# Patient Record
Sex: MALE | Race: WHITE | NOT HISPANIC OR LATINO | Employment: OTHER | URBAN - NONMETROPOLITAN AREA
[De-identification: names, ages, dates, MRNs, and addresses within clinical notes are randomized per-mention and may not be internally consistent; named-entity substitution may affect disease eponyms.]

---

## 2017-03-21 ENCOUNTER — HOSPITAL ENCOUNTER (EMERGENCY)
Facility: HOSPITAL | Age: 77
Discharge: HOME | End: 2017-03-21
Attending: EMERGENCY MEDICINE | Admitting: EMERGENCY MEDICINE

## 2017-03-21 VITALS
DIASTOLIC BLOOD PRESSURE: 76 MMHG | RESPIRATION RATE: 16 BRPM | OXYGEN SATURATION: 100 % | BODY MASS INDEX: 28.63 KG/M2 | SYSTOLIC BLOOD PRESSURE: 128 MMHG | HEIGHT: 70 IN | TEMPERATURE: 98.1 F | HEART RATE: 74 BPM | WEIGHT: 200 LBS

## 2017-03-21 DIAGNOSIS — I83.899: Primary | ICD-10-CM

## 2017-03-21 LAB
ALBUMIN SERPL-MCNC: 3.6 G/DL (ref 3.4–4.8)
ALBUMIN/GLOB SERPL: 1.3 G/DL (ref 1.5–2.5)
ALP SERPL-CCNC: 74 U/L (ref 40–129)
ALT SERPL W P-5'-P-CCNC: 13 U/L (ref 10–44)
ANION GAP SERPL CALCULATED.3IONS-SCNC: 1.6 MMOL/L (ref 3.6–11.2)
AST SERPL-CCNC: 23 U/L (ref 10–34)
BASOPHILS # BLD AUTO: 0.04 10*3/MM3 (ref 0–0.3)
BASOPHILS NFR BLD AUTO: 0.6 % (ref 0–2)
BILIRUB SERPL-MCNC: 1 MG/DL (ref 0.2–1.8)
BUN BLD-MCNC: 25 MG/DL (ref 7–21)
BUN/CREAT SERPL: 21.2 (ref 7–25)
CALCIUM SPEC-SCNC: 8.6 MG/DL (ref 7.7–10)
CHLORIDE SERPL-SCNC: 103 MMOL/L (ref 99–112)
CO2 SERPL-SCNC: 33.4 MMOL/L (ref 24.3–31.9)
CREAT BLD-MCNC: 1.18 MG/DL (ref 0.43–1.29)
DEPRECATED RDW RBC AUTO: 53 FL (ref 37–54)
EOSINOPHIL # BLD AUTO: 0.35 10*3/MM3 (ref 0–0.7)
EOSINOPHIL NFR BLD AUTO: 5.1 % (ref 0–7)
ERYTHROCYTE [DISTWIDTH] IN BLOOD BY AUTOMATED COUNT: 16.4 % (ref 11.5–14.5)
GFR SERPL CREATININE-BSD FRML MDRD: 60 ML/MIN/1.73
GLOBULIN UR ELPH-MCNC: 2.7 GM/DL
GLUCOSE BLD-MCNC: 153 MG/DL (ref 70–110)
HCT VFR BLD AUTO: 26.8 % (ref 42–52)
HGB BLD-MCNC: 8.6 G/DL (ref 14–18)
IMM GRANULOCYTES # BLD: 0.02 10*3/MM3 (ref 0–0.03)
IMM GRANULOCYTES NFR BLD: 0.3 % (ref 0–0.5)
INR PPP: 1.63 (ref 0.8–1.1)
LYMPHOCYTES # BLD AUTO: 1.08 10*3/MM3 (ref 1–3)
LYMPHOCYTES NFR BLD AUTO: 15.6 % (ref 16–46)
MCH RBC QN AUTO: 28.3 PG (ref 27–33)
MCHC RBC AUTO-ENTMCNC: 32.1 G/DL (ref 33–37)
MCV RBC AUTO: 88.2 FL (ref 80–94)
MONOCYTES # BLD AUTO: 0.55 10*3/MM3 (ref 0.1–0.9)
MONOCYTES NFR BLD AUTO: 8 % (ref 0–12)
NEUTROPHILS # BLD AUTO: 4.87 10*3/MM3 (ref 1.4–6.5)
NEUTROPHILS NFR BLD AUTO: 70.4 % (ref 40–75)
OSMOLALITY SERPL CALC.SUM OF ELEC: 283.1 MOSM/KG (ref 273–305)
PLATELET # BLD AUTO: 189 10*3/MM3 (ref 130–400)
PMV BLD AUTO: 10.6 FL (ref 6–10)
POTASSIUM BLD-SCNC: 3.6 MMOL/L (ref 3.5–5.3)
PROT SERPL-MCNC: 6.3 G/DL (ref 6–8)
PROTHROMBIN TIME: 18.7 SECONDS (ref 9.8–11.9)
RBC # BLD AUTO: 3.04 10*6/MM3 (ref 4.7–6.1)
SODIUM BLD-SCNC: 138 MMOL/L (ref 135–153)
WBC NRBC COR # BLD: 6.91 10*3/MM3 (ref 4.5–12.5)

## 2017-03-21 PROCEDURE — 99283 EMERGENCY DEPT VISIT LOW MDM: CPT

## 2017-03-21 PROCEDURE — 36415 COLL VENOUS BLD VENIPUNCTURE: CPT

## 2017-03-21 PROCEDURE — 85025 COMPLETE CBC W/AUTO DIFF WBC: CPT | Performed by: EMERGENCY MEDICINE

## 2017-03-21 PROCEDURE — 80053 COMPREHEN METABOLIC PANEL: CPT | Performed by: EMERGENCY MEDICINE

## 2017-03-21 PROCEDURE — 85610 PROTHROMBIN TIME: CPT | Performed by: EMERGENCY MEDICINE

## 2017-03-21 RX ORDER — LIDOCAINE HYDROCHLORIDE AND EPINEPHRINE 10; 10 MG/ML; UG/ML
INJECTION, SOLUTION INFILTRATION; PERINEURAL
Status: DISCONTINUED
Start: 2017-03-21 | End: 2017-03-21 | Stop reason: HOSPADM

## 2017-03-21 NOTE — ED PROVIDER NOTES
Subjective   Patient is a 76 y.o. male presenting with lower extremity pain.   Lower Extremity Issue   Location:  Ankle  Time since incident:  12 hours  Pain details:     Quality:  Unable to specify    Radiates to:  Does not radiate    Severity:  No pain  Ineffective treatments: pressure didnt stop bleeding.  Associated symptoms: back pain    Associated symptoms: no fever, no neck pain and no swelling        Review of Systems   Constitutional: Negative.  Negative for fever.   HENT: Negative.    Respiratory: Negative.    Cardiovascular: Negative.  Negative for chest pain.   Gastrointestinal: Negative.  Negative for abdominal pain.   Endocrine: Negative.    Genitourinary: Negative.  Negative for dysuria.   Musculoskeletal: Positive for back pain. Negative for neck pain.   Skin: Negative.         Bleeding from varicose vein left lower extremity   Neurological: Negative.    Psychiatric/Behavioral: Negative.    All other systems reviewed and are negative.      Past Medical History   Diagnosis Date   • Hypertension        No Known Allergies    Past Surgical History   Procedure Laterality Date   • Cataract extraction     • Hernia repair     • Coronary artery bypass graft     • Cardiac pacemaker placement     • Knee surgery     • Ankle surgery     • Hydrocele excision / repair         No family history on file.    Social History     Social History   • Marital status:      Spouse name: N/A   • Number of children: N/A   • Years of education: N/A     Social History Main Topics   • Smoking status: Never Smoker   • Smokeless tobacco: Not on file   • Alcohol use No   • Drug use: No   • Sexual activity: Defer     Other Topics Concern   • Not on file     Social History Narrative   • No narrative on file           Objective   Physical Exam   Constitutional: He is oriented to person, place, and time. He appears well-developed and well-nourished. No distress.   HENT:   Head: Normocephalic and atraumatic.   Right Ear: External  ear normal.   Left Ear: External ear normal.   Nose: Nose normal.   Eyes: Conjunctivae and EOM are normal. Pupils are equal, round, and reactive to light.   Neck: Normal range of motion. Neck supple. No JVD present. No tracheal deviation present.   Cardiovascular: Normal rate, regular rhythm and normal heart sounds.    No murmur heard.  Pulmonary/Chest: Effort normal and breath sounds normal. No respiratory distress. He has no wheezes.   Abdominal: Soft. Bowel sounds are normal. There is no tenderness.   Musculoskeletal: Normal range of motion. He exhibits no edema or deformity.   Neurological: He is alert and oriented to person, place, and time. No cranial nerve deficit.   Skin: Skin is warm and dry. No rash noted. He is not diaphoretic. No erythema. No pallor.   Bleeding from varicose vein left lower extremity   Psychiatric: He has a normal mood and affect. His behavior is normal. Thought content normal.   Nursing note and vitals reviewed.      Procedures         ED Course  ED Course                  MDM    Final diagnoses:   Bleeding from varicose vein, unspecified laterality            Chava Sheikh MD  03/21/17 4094

## 2017-03-21 NOTE — ED NOTES
Dressing applied consisting of telfa, 4x4, and jamie.  Bleeding controled.       Linda Barrett RN  03/21/17 [id number]

## 2017-03-21 NOTE — ED NOTES
Bleeding area cauterized with silver nitrate applicator by Dr. Sheikh.  Bleeding controlled.     Linda Barrett RN  03/21/17 [id number]

## 2017-04-11 ENCOUNTER — OFFICE VISIT (OUTPATIENT)
Dept: SURGERY | Facility: CLINIC | Age: 77
End: 2017-04-11

## 2017-04-11 VITALS
RESPIRATION RATE: 20 BRPM | WEIGHT: 200 LBS | HEIGHT: 70 IN | SYSTOLIC BLOOD PRESSURE: 125 MMHG | HEART RATE: 80 BPM | BODY MASS INDEX: 28.63 KG/M2 | TEMPERATURE: 97.6 F | DIASTOLIC BLOOD PRESSURE: 77 MMHG

## 2017-04-11 DIAGNOSIS — I83.893 VARICOSE VEINS OF BILATERAL LOWER EXTREMITIES WITH OTHER COMPLICATIONS: Primary | ICD-10-CM

## 2017-04-11 PROCEDURE — 99202 OFFICE O/P NEW SF 15 MIN: CPT | Performed by: SURGERY

## 2017-04-11 NOTE — PROGRESS NOTES
4/11/2017    Patient Information  Luis Thompson   [address]  [place] [address]  1940  [phone] (home)     Chief Complaint   Patient presents with   • Varicose Veins         HPI  Patient is a 76-year-old white male referred from the emergency room.  He went there a few days ago because he had some bleeding from superficial varicosities in the left lower extremity.  He reports having varicose veins many years.  He is on Coumadin.  He has multiple comorbidities related to his heart.        Review of Systems:    General: negative  Integumentary: non-healing wound  Eyes: glasses  ENT: negative  Respiratory: shortness of breath  Gastrointestinal: negative  Cardiovascular: negative  Neurological: negative  Psychiatric: negative  Hematologic/Lymphatic: easy bleeding and easy bruising  Genitourinary: negative  Musculoskeletal: negative  Endocrine: negative  Breasts: negative      There is no problem list on file for this patient.        Past Medical History:   Diagnosis Date   • Atrial fibrillation    • Heart disease    • Hypertension          Past Surgical History:   Procedure Laterality Date   • ANKLE SURGERY     • CARDIAC PACEMAKER PLACEMENT     • CATARACT EXTRACTION     • CORONARY ARTERY BYPASS GRAFT     • HERNIA REPAIR     • HYDROCELE EXCISION / REPAIR     • KNEE SURGERY           History reviewed. No pertinent family history.      Social History   Substance Use Topics   • Smoking status: Never Smoker   • Smokeless tobacco: None   • Alcohol use No       Current Outpatient Prescriptions   Medication Sig Dispense Refill   • carvedilol (COREG) 25 MG tablet Take 25 mg by mouth Daily.     • CloNIDine (CATAPRES) 0.1 MG tablet Take 0.1 mg by mouth As Needed for high blood pressure.     • furosemide (LASIX) 20 MG tablet Take 20 mg by mouth Daily.     • potassium chloride (K-DUR,KLOR-CON) 20 MEQ CR tablet Take 20 mEq by mouth Daily.     • warfarin (COUMADIN) 5 MG tablet Take 5 mg by mouth Daily.       No current  "facility-administered medications for this visit.          Allergies  Review of patient's allergies indicates no known allergies.      Physical Exam  Bilateral lower extremities with multiple varicosities    /77  Pulse 80  Temp 97.6 °F (36.4 °C) (Oral)   Resp 20  Ht 70\" (177.8 cm)  Wt 200 lb (90.7 kg)  BMI 28.7 kg/m2      ASSESSMENT  Bilateral varicose veins on Coumadin        PLAN    We'll get venous Doppler bilateral lower extremities to evaluate his deep venous system.  I think we can probably get by without any surgical intervention.        Magan Hu MD    "

## 2017-04-19 ENCOUNTER — HOSPITAL ENCOUNTER (OUTPATIENT)
Dept: CARDIOLOGY | Facility: HOSPITAL | Age: 77
Discharge: HOME | End: 2017-04-19
Attending: SURGERY | Admitting: SURGERY

## 2017-04-19 DIAGNOSIS — I83.893 VARICOSE VEINS OF BILATERAL LOWER EXTREMITIES WITH OTHER COMPLICATIONS: ICD-10-CM

## 2017-04-19 PROCEDURE — 93970 EXTREMITY STUDY: CPT

## 2017-04-19 PROCEDURE — 93970 EXTREMITY STUDY: CPT | Performed by: RADIOLOGY

## 2017-05-02 ENCOUNTER — OFFICE VISIT (OUTPATIENT)
Dept: SURGERY | Facility: CLINIC | Age: 77
End: 2017-05-02

## 2017-05-02 VITALS
DIASTOLIC BLOOD PRESSURE: 77 MMHG | WEIGHT: 200 LBS | HEIGHT: 70 IN | HEART RATE: 79 BPM | RESPIRATION RATE: 17 BRPM | BODY MASS INDEX: 28.63 KG/M2 | TEMPERATURE: 98.2 F | SYSTOLIC BLOOD PRESSURE: 126 MMHG

## 2017-05-02 DIAGNOSIS — I83.893 VARICOSE VEINS OF BILATERAL LOWER EXTREMITIES WITH OTHER COMPLICATIONS: Primary | ICD-10-CM

## 2017-05-02 PROBLEM — Z09 FOLLOW UP: Status: ACTIVE | Noted: 2017-05-02

## 2017-05-02 PROCEDURE — 99213 OFFICE O/P EST LOW 20 MIN: CPT | Performed by: SURGERY

## 2017-08-24 ENCOUNTER — TRANSCRIBE ORDERS (OUTPATIENT)
Dept: ADMINISTRATIVE | Facility: HOSPITAL | Age: 77
End: 2017-08-24

## 2017-08-24 DIAGNOSIS — R60.9 SWELLING: Primary | ICD-10-CM

## 2017-08-25 ENCOUNTER — HOSPITAL ENCOUNTER (OUTPATIENT)
Dept: ULTRASOUND IMAGING | Facility: HOSPITAL | Age: 77
Discharge: HOME | End: 2017-08-25
Attending: INTERNAL MEDICINE | Admitting: INTERNAL MEDICINE

## 2017-08-25 DIAGNOSIS — R60.9 SWELLING: ICD-10-CM

## 2017-08-25 PROCEDURE — 76700 US EXAM ABDOM COMPLETE: CPT

## 2017-08-25 PROCEDURE — 76700 US EXAM ABDOM COMPLETE: CPT | Performed by: RADIOLOGY

## 2017-08-29 ENCOUNTER — TRANSCRIBE ORDERS (OUTPATIENT)
Dept: ADMINISTRATIVE | Facility: HOSPITAL | Age: 77
End: 2017-08-29

## 2017-08-29 DIAGNOSIS — R93.5 ABNORMAL US (ULTRASOUND) OF ABDOMEN: Primary | ICD-10-CM

## 2017-08-30 ENCOUNTER — HOSPITAL ENCOUNTER (OUTPATIENT)
Dept: CT IMAGING | Facility: HOSPITAL | Age: 77
Discharge: HOME | End: 2017-08-30
Attending: INTERNAL MEDICINE | Admitting: INTERNAL MEDICINE

## 2017-08-30 DIAGNOSIS — R93.5 ABNORMAL US (ULTRASOUND) OF ABDOMEN: ICD-10-CM

## 2017-08-30 LAB — CREAT BLDA-MCNC: 1.3 MG/DL (ref 0.6–1.3)

## 2017-08-30 PROCEDURE — 82565 ASSAY OF CREATININE: CPT

## 2017-08-30 PROCEDURE — 74177 CT ABD & PELVIS W/CONTRAST: CPT | Performed by: RADIOLOGY

## 2017-08-30 PROCEDURE — 74177 CT ABD & PELVIS W/CONTRAST: CPT

## 2017-08-30 PROCEDURE — 0 IOPAMIDOL 61 % SOLUTION: Performed by: INTERNAL MEDICINE

## 2017-08-30 RX ADMIN — IOPAMIDOL 100 ML: 612 INJECTION, SOLUTION INTRAVENOUS at 09:30

## 2017-09-01 ENCOUNTER — OFFICE VISIT (OUTPATIENT)
Dept: SURGERY | Facility: CLINIC | Age: 77
End: 2017-09-01

## 2017-09-01 VITALS
DIASTOLIC BLOOD PRESSURE: 77 MMHG | WEIGHT: 203.6 LBS | HEIGHT: 70 IN | BODY MASS INDEX: 29.15 KG/M2 | SYSTOLIC BLOOD PRESSURE: 134 MMHG | HEART RATE: 71 BPM

## 2017-09-01 DIAGNOSIS — D50.0 IRON DEFICIENCY ANEMIA DUE TO CHRONIC BLOOD LOSS: ICD-10-CM

## 2017-09-01 DIAGNOSIS — I25.810 CORONARY ARTERY DISEASE INVOLVING CORONARY BYPASS GRAFT OF NATIVE HEART WITHOUT ANGINA PECTORIS: ICD-10-CM

## 2017-09-01 DIAGNOSIS — I15.9 SECONDARY HYPERTENSION: ICD-10-CM

## 2017-09-01 DIAGNOSIS — D64.9 ANEMIA, UNSPECIFIED TYPE: Primary | ICD-10-CM

## 2017-09-01 DIAGNOSIS — I48.91 ATRIAL FIBRILLATION, UNSPECIFIED TYPE (HCC): ICD-10-CM

## 2017-09-01 PROCEDURE — 99215 OFFICE O/P EST HI 40 MIN: CPT | Performed by: SURGERY

## 2017-09-01 NOTE — PROGRESS NOTES
9/1/2017    Patient Information  Luis Thompson   [address]  [place] [address]  1940  [phone] (home)     Chief Complaint   Patient presents with   • Follow-up   • Results       HPI  Patient is a 77-year-old white male who has anemia.  He is referred by Dr. Pérez.  He has iron deficiency anemia with etiology unknown.  She is referred him for an EGD and colonoscopy for further evaluation of his anemia.  He takes Coumadin for atrial..  He also has coronary artery disease.     Review of Syste  Review of systems reviewed and confirmed    General: negative  Integumentary: non-healing wound  Eyes: glasses  ENT: negative  Respiratory: shortness of breath  Gastrointestinal: negative  Cardiovascular: negative  Neurological: negative  Psychiatric: negative  Hematologic/Lymphatic: easy bleeding and easy bruising  Genitourinary: negative  Musculoskeletal: negative  Endocrine: negative  Breasts: negative  There is no problem list on file for this patient.      Patient Active Problem List   Diagnosis   • Follow up         Past Medical History:   Diagnosis Date   • Atrial fibrillation    • Heart disease    • Hypertension          Past Surgical History:   Procedure Laterality Date   • ANKLE SURGERY     • CARDIAC PACEMAKER PLACEMENT     • CATARACT EXTRACTION     • CORONARY ARTERY BYPASS GRAFT     • HERNIA REPAIR     • HYDROCELE EXCISION / REPAIR     • KNEE SURGERY           History reviewed. No pertinent family history.      Social History   Substance Use Topics   • Smoking status: Never Smoker   • Smokeless tobacco: None   • Alcohol use No       Current Outpatient Prescriptions   Medication Sig Dispense Refill   • carvedilol (COREG) 25 MG tablet Take 25 mg by mouth Daily.     • CloNIDine (CATAPRES) 0.1 MG tablet Take 0.1 mg by mouth As Needed for high blood pressure.     • furosemide (LASIX) 20 MG tablet Take 20 mg by mouth Daily.     • potassium chloride (K-DUR,KLOR-CON) 20 MEQ CR tablet Take 20 mEq by mouth Daily.     •  warfarin (COUMADIN) 5 MG tablet Take 5 mg by mouth Daily.       No current facility-administered medications for this visit.          Allergies  Review of patient's allergies indicates no known allergies.      Physical Exam   Constitutional: He is oriented to person, place, and time. He appears well-developed and well-nourished. No distress.   HENT:   Head: Normocephalic.   Right Ear: External ear normal.   Left Ear: External ear normal.   Nose: Nose normal.   Mouth/Throat: Oropharynx is clear and moist.   Eyes: Conjunctivae and EOM are normal. Right eye exhibits no discharge. Left eye exhibits no discharge.   Neck: Normal range of motion. No JVD present. No tracheal deviation present. No thyromegaly present.   Cardiovascular: Normal rate, regular rhythm, normal heart sounds and intact distal pulses.  Exam reveals no gallop and no friction rub.    No murmur heard.  Pulmonary/Chest: Effort normal and breath sounds normal. No stridor. No respiratory distress. He has no wheezes. He has no rales. He exhibits no tenderness.   Sternal scar from coronary artery bypass   Abdominal: Soft. Bowel sounds are normal. He exhibits no distension and no mass. There is no tenderness. There is no rebound and no guarding. No hernia.   Firmness around the umbilicus where he had mesh placed for hernia repair   Genitourinary: Rectal exam shows guaiac negative stool.   Musculoskeletal: Normal range of motion. He exhibits no edema, tenderness or deformity.   Lymphadenopathy:     He has no cervical adenopathy.   Neurological: He is alert and oriented to person, place, and time. He has normal reflexes. He displays normal reflexes. No cranial nerve deficit. He exhibits normal muscle tone. Coordination normal.   Skin: Skin is warm and dry. No rash noted. He is not diaphoretic. No erythema. No pallor.   Psychiatric: He has a normal mood and affect. His behavior is normal. Judgment and thought content normal.   Nursing note and vitals  "reviewed.        /77 (BP Location: Left arm, Patient Position: Sitting)  Pulse 71  Ht 70\" (177.8 cm)  Wt 203 lb 9.6 oz (92.4 kg)  BMI 29.21 kg/m2        ASSESSMENT  Patient has Iron Deficiency Anemia and is on Coumadin.        PLAN    EGD and colonoscopy.  We'll check INR day of procedure.  If it is 2 or less we'll proceed.        Magan Hu MD    "

## 2017-09-05 ENCOUNTER — PREP FOR SURGERY (OUTPATIENT)
Dept: OTHER | Facility: HOSPITAL | Age: 77
End: 2017-09-05

## 2017-09-05 ENCOUNTER — TELEPHONE (OUTPATIENT)
Dept: SURGERY | Facility: CLINIC | Age: 77
End: 2017-09-05

## 2017-09-05 PROBLEM — I25.810 CORONARY ARTERY DISEASE INVOLVING CORONARY BYPASS GRAFT OF NATIVE HEART WITHOUT ANGINA PECTORIS: Status: ACTIVE | Noted: 2017-09-05

## 2017-09-05 PROBLEM — I15.9 SECONDARY HYPERTENSION: Status: ACTIVE | Noted: 2017-09-05

## 2017-09-05 PROBLEM — D64.9 ANEMIA: Status: ACTIVE | Noted: 2017-09-05

## 2017-09-05 PROBLEM — I48.91 ATRIAL FIBRILLATION: Status: ACTIVE | Noted: 2017-09-05

## 2017-09-05 NOTE — TELEPHONE ENCOUNTER
Called to let patient know that he will need to come off of his Coumadin 3 days prior to his surgery and also that we will need to check his I&R on the day of surgery.

## 2017-09-06 RX ORDER — FUROSEMIDE 40 MG/1
TABLET ORAL DAILY
COMMUNITY
Start: 2013-08-12 | End: 2021-03-16

## 2017-09-06 RX ORDER — CARVEDILOL 25 MG/1
25 TABLET ORAL DAILY
COMMUNITY
Start: 2013-02-08 | End: 2021-08-26 | Stop reason: SDUPTHER

## 2017-09-07 ENCOUNTER — TELEPHONE (OUTPATIENT)
Dept: SURGERY | Facility: CLINIC | Age: 77
End: 2017-09-07

## 2017-09-08 ENCOUNTER — ANESTHESIA EVENT (OUTPATIENT)
Dept: PERIOP | Facility: HOSPITAL | Age: 77
End: 2017-09-08

## 2017-09-08 ENCOUNTER — ANESTHESIA (OUTPATIENT)
Dept: PERIOP | Facility: HOSPITAL | Age: 77
End: 2017-09-08

## 2017-09-08 ENCOUNTER — HOSPITAL ENCOUNTER (OUTPATIENT)
Facility: HOSPITAL | Age: 77
Setting detail: HOSPITAL OUTPATIENT SURGERY
Discharge: HOME | End: 2017-09-08
Attending: SURGERY | Admitting: SURGERY

## 2017-09-08 VITALS
DIASTOLIC BLOOD PRESSURE: 84 MMHG | TEMPERATURE: 97.5 F | HEART RATE: 72 BPM | WEIGHT: 200 LBS | RESPIRATION RATE: 20 BRPM | HEIGHT: 70 IN | BODY MASS INDEX: 28.63 KG/M2 | OXYGEN SATURATION: 100 % | SYSTOLIC BLOOD PRESSURE: 133 MMHG

## 2017-09-08 DIAGNOSIS — I15.9 SECONDARY HYPERTENSION: ICD-10-CM

## 2017-09-08 DIAGNOSIS — I25.810 CORONARY ARTERY DISEASE INVOLVING CORONARY BYPASS GRAFT OF NATIVE HEART WITHOUT ANGINA PECTORIS: ICD-10-CM

## 2017-09-08 DIAGNOSIS — I48.91 ATRIAL FIBRILLATION, UNSPECIFIED TYPE (HCC): ICD-10-CM

## 2017-09-08 DIAGNOSIS — D64.9 ANEMIA, UNSPECIFIED TYPE: ICD-10-CM

## 2017-09-08 LAB
APTT PPP: 53.8 SECONDS (ref 23.8–36.1)
INR PPP: 2.09 (ref 0.9–1.1)
PROTHROMBIN TIME: 23.7 SECONDS (ref 11–15.4)

## 2017-09-08 PROCEDURE — 25010000002 FENTANYL CITRATE (PF) 100 MCG/2ML SOLUTION: Performed by: NURSE ANESTHETIST, CERTIFIED REGISTERED

## 2017-09-08 PROCEDURE — 45378 DIAGNOSTIC COLONOSCOPY: CPT | Performed by: SURGERY

## 2017-09-08 PROCEDURE — 25010000002 PROPOFOL 1000 MG/ML EMULSION: Performed by: NURSE ANESTHETIST, CERTIFIED REGISTERED

## 2017-09-08 PROCEDURE — 85730 THROMBOPLASTIN TIME PARTIAL: CPT | Performed by: SURGERY

## 2017-09-08 PROCEDURE — 43239 EGD BIOPSY SINGLE/MULTIPLE: CPT | Performed by: SURGERY

## 2017-09-08 PROCEDURE — 25010000002 PROPOFOL 10 MG/ML EMULSION: Performed by: NURSE ANESTHETIST, CERTIFIED REGISTERED

## 2017-09-08 PROCEDURE — 85610 PROTHROMBIN TIME: CPT | Performed by: SURGERY

## 2017-09-08 PROCEDURE — 88305 TISSUE EXAM BY PATHOLOGIST: CPT | Performed by: SURGERY

## 2017-09-08 RX ORDER — LIDOCAINE HYDROCHLORIDE 20 MG/ML
INJECTION, SOLUTION INFILTRATION; PERINEURAL AS NEEDED
Status: DISCONTINUED | OUTPATIENT
Start: 2017-09-08 | End: 2017-09-08 | Stop reason: SURG

## 2017-09-08 RX ORDER — ONDANSETRON 2 MG/ML
4 INJECTION INTRAMUSCULAR; INTRAVENOUS ONCE AS NEEDED
Status: DISCONTINUED | OUTPATIENT
Start: 2017-09-08 | End: 2017-09-08 | Stop reason: HOSPADM

## 2017-09-08 RX ORDER — SODIUM CHLORIDE, SODIUM LACTATE, POTASSIUM CHLORIDE, CALCIUM CHLORIDE 600; 310; 30; 20 MG/100ML; MG/100ML; MG/100ML; MG/100ML
125 INJECTION, SOLUTION INTRAVENOUS CONTINUOUS
Status: DISCONTINUED | OUTPATIENT
Start: 2017-09-08 | End: 2017-09-08 | Stop reason: HOSPADM

## 2017-09-08 RX ORDER — FENTANYL CITRATE 50 UG/ML
50 INJECTION, SOLUTION INTRAMUSCULAR; INTRAVENOUS
Status: DISCONTINUED | OUTPATIENT
Start: 2017-09-08 | End: 2017-09-08 | Stop reason: HOSPADM

## 2017-09-08 RX ORDER — FENTANYL CITRATE 50 UG/ML
INJECTION, SOLUTION INTRAMUSCULAR; INTRAVENOUS AS NEEDED
Status: DISCONTINUED | OUTPATIENT
Start: 2017-09-08 | End: 2017-09-08 | Stop reason: SURG

## 2017-09-08 RX ORDER — IPRATROPIUM BROMIDE AND ALBUTEROL SULFATE 2.5; .5 MG/3ML; MG/3ML
3 SOLUTION RESPIRATORY (INHALATION) ONCE AS NEEDED
Status: DISCONTINUED | OUTPATIENT
Start: 2017-09-08 | End: 2017-09-08 | Stop reason: HOSPADM

## 2017-09-08 RX ORDER — PROPOFOL 10 MG/ML
VIAL (ML) INTRAVENOUS AS NEEDED
Status: DISCONTINUED | OUTPATIENT
Start: 2017-09-08 | End: 2017-09-08 | Stop reason: SURG

## 2017-09-08 RX ORDER — SODIUM CHLORIDE 0.9 % (FLUSH) 0.9 %
1-10 SYRINGE (ML) INJECTION AS NEEDED
Status: DISCONTINUED | OUTPATIENT
Start: 2017-09-08 | End: 2017-09-08 | Stop reason: HOSPADM

## 2017-09-08 RX ADMIN — FENTANYL CITRATE 100 MCG: 50 INJECTION INTRAMUSCULAR; INTRAVENOUS at 11:12

## 2017-09-08 RX ADMIN — PROPOFOL 30 MG: 10 INJECTION, EMULSION INTRAVENOUS at 11:16

## 2017-09-08 RX ADMIN — PROPOFOL 100 MCG/KG/MIN: 10 INJECTION, EMULSION INTRAVENOUS at 11:16

## 2017-09-08 RX ADMIN — SODIUM CHLORIDE, POTASSIUM CHLORIDE, SODIUM LACTATE AND CALCIUM CHLORIDE: 600; 310; 30; 20 INJECTION, SOLUTION INTRAVENOUS at 11:13

## 2017-09-08 RX ADMIN — LIDOCAINE HYDROCHLORIDE 100 MG: 20 INJECTION, SOLUTION INFILTRATION; PERINEURAL at 11:16

## 2017-09-08 NOTE — ANESTHESIA POSTPROCEDURE EVALUATION
Patient: Luis Thompson    Procedure Summary     Date Anesthesia Start Anesthesia Stop Room / Location    09/08/17 1113 1137  [facility] 07 / [facility]       Procedure Diagnosis Surgeon Provider    ESOPHAGOGASTRODUODENOSCOPY (N/A Esophagus); COLONOSCOPY (N/A ) Secondary hypertension; Coronary artery disease involving coronary bypass graft of native heart without angina pectoris; Anemia, unspecified type; Atrial fibrillation, unspecified type  (Secondary hypertension [I15.9]; Coronary artery disease involving coronary bypass graft of native heart without angina pectoris [I25.810]; Anemia, unspecified type [D64.9]; Atrial fibrillation, unspecified type [I48.91]) MD Kwaku Lopez MD          Anesthesia Type: general  Last vitals  BP   106/64 (09/08/17 1143)    Temp   97.5 °F (36.4 °C) (09/08/17 1138)    Pulse   73 (09/08/17 1143)   Resp   20 (09/08/17 1143)    SpO2   99 % (09/08/17 1143)      Post Anesthesia Care and Evaluation    Patient location during evaluation: PHASE II  Patient participation: complete - patient participated  Level of consciousness: awake and alert  Pain score: 1  Pain management: adequate  Airway patency: patent  Anesthetic complications: No anesthetic complications  PONV Status: controlled  Cardiovascular status: acceptable  Respiratory status: acceptable  Hydration status: acceptable

## 2017-09-08 NOTE — PLAN OF CARE
Problem: GI Endoscopy (Adult)  Goal: Signs and Symptoms of Listed Potential Problems Will be Absent or Manageable (GI Endoscopy)  Outcome: Ongoing (interventions implemented as appropriate)    09/08/17 0969   GI Endoscopy   Problems Assessed (GI Endoscopy) all   Problems Present (GI Endoscopy) none

## 2017-09-08 NOTE — H&P (VIEW-ONLY)
9/1/2017    Patient Information  Luis Thompson   [address]  [place] [address]  1940  [phone] (home)     Chief Complaint   Patient presents with   • Follow-up   • Results       HPI  Patient is a 77-year-old white male who has anemia.  He is referred by Dr. éPrez.  He is anemic with etiology unknown.  She is referred him for an EGD and colonoscopy for further evaluation of his anemia.  He takes Coumadin for atrial..  He also has coronary artery disease.     Review of Syste  Review of systems reviewed and confirmed    General: negative  Integumentary: non-healing wound  Eyes: glasses  ENT: negative  Respiratory: shortness of breath  Gastrointestinal: negative  Cardiovascular: negative  Neurological: negative  Psychiatric: negative  Hematologic/Lymphatic: easy bleeding and easy bruising  Genitourinary: negative  Musculoskeletal: negative  Endocrine: negative  Breasts: negative  There is no problem list on file for this patient.      Patient Active Problem List   Diagnosis   • Follow up         Past Medical History:   Diagnosis Date   • Atrial fibrillation    • Heart disease    • Hypertension          Past Surgical History:   Procedure Laterality Date   • ANKLE SURGERY     • CARDIAC PACEMAKER PLACEMENT     • CATARACT EXTRACTION     • CORONARY ARTERY BYPASS GRAFT     • HERNIA REPAIR     • HYDROCELE EXCISION / REPAIR     • KNEE SURGERY           History reviewed. No pertinent family history.      Social History   Substance Use Topics   • Smoking status: Never Smoker   • Smokeless tobacco: None   • Alcohol use No       Current Outpatient Prescriptions   Medication Sig Dispense Refill   • carvedilol (COREG) 25 MG tablet Take 25 mg by mouth Daily.     • CloNIDine (CATAPRES) 0.1 MG tablet Take 0.1 mg by mouth As Needed for high blood pressure.     • furosemide (LASIX) 20 MG tablet Take 20 mg by mouth Daily.     • potassium chloride (K-DUR,KLOR-CON) 20 MEQ CR tablet Take 20 mEq by mouth Daily.     • warfarin  (COUMADIN) 5 MG tablet Take 5 mg by mouth Daily.       No current facility-administered medications for this visit.          Allergies  Review of patient's allergies indicates no known allergies.      Physical Exam   Constitutional: He is oriented to person, place, and time. He appears well-developed and well-nourished. No distress.   HENT:   Head: Normocephalic.   Right Ear: External ear normal.   Left Ear: External ear normal.   Nose: Nose normal.   Mouth/Throat: Oropharynx is clear and moist.   Eyes: Conjunctivae and EOM are normal. Right eye exhibits no discharge. Left eye exhibits no discharge.   Neck: Normal range of motion. No JVD present. No tracheal deviation present. No thyromegaly present.   Cardiovascular: Normal rate, regular rhythm, normal heart sounds and intact distal pulses.  Exam reveals no gallop and no friction rub.    No murmur heard.  Pulmonary/Chest: Effort normal and breath sounds normal. No stridor. No respiratory distress. He has no wheezes. He has no rales. He exhibits no tenderness.   Sternal scar from coronary artery bypass   Abdominal: Soft. Bowel sounds are normal. He exhibits no distension and no mass. There is no tenderness. There is no rebound and no guarding. No hernia.   Firmness around the umbilicus where he had mesh placed for hernia repair   Genitourinary: Rectal exam shows guaiac negative stool.   Musculoskeletal: Normal range of motion. He exhibits no edema, tenderness or deformity.   Lymphadenopathy:     He has no cervical adenopathy.   Neurological: He is alert and oriented to person, place, and time. He has normal reflexes. He displays normal reflexes. No cranial nerve deficit. He exhibits normal muscle tone. Coordination normal.   Skin: Skin is warm and dry. No rash noted. He is not diaphoretic. No erythema. No pallor.   Psychiatric: He has a normal mood and affect. His behavior is normal. Judgment and thought content normal.   Nursing note and vitals reviewed.        BP  "134/77 (BP Location: Left arm, Patient Position: Sitting)  Pulse 71  Ht 70\" (177.8 cm)  Wt 203 lb 9.6 oz (92.4 kg)  BMI 29.21 kg/m2        ASSESSMENT  Anemia.  On Coumadin        PLAN    EGD and colonoscopy.  We'll check INR day of procedure.  If it is 2 or less we'll proceed.        Magan Hu MD    "

## 2017-09-08 NOTE — PLAN OF CARE
Problem: Patient Care Overview (Adult)  Goal: Plan of Care Review  Outcome: Ongoing (interventions implemented as appropriate)    09/08/17 0923   Coping/Psychosocial Response Interventions   Plan Of Care Reviewed With patient   Patient Care Overview   Progress progress toward functional goals as expected

## 2017-09-08 NOTE — PLAN OF CARE
Problem: Patient Care Overview (Adult)  Goal: Discharge Needs Assessment  Outcome: Ongoing (interventions implemented as appropriate)    09/08/17 0947   Discharge Needs Assessment   Concerns To Be Addressed no discharge needs identified

## 2017-09-08 NOTE — OP NOTE
Luis Thompson  9/8/2017      Operative Progress Note:   Surgeon and Assistant: Dr. Hu     Pre-Operative Diagnosis: Iron Deficiency Anemia with chronic blood loss.     Post-Operative Diagnosis: same with normal EGD and extensive diverticulosis sigmoid colon     Procedure(s):  EGD with biopsies and colonoscopy to cecum     Indication: patient is a 77-year-old white male above diagnosis     Type of Anesthesia Administered: IV general     Estimated Blood Loss: Minimal     Blood Products: None     Specimen Obtained/Removed: duodenal, esophageal, stomach biopsies     Complication(s):  None     Graft/Implant/Prosthetics/Implanted Device/Transplants: None     Findings: duodenum normal, stomach normal, esophagus normal.  Extensive sigmoid diverticulosis otherwise the rest colon was normal     Operative Report:  Patient was taken operating room and placed in a left lateral decubitus position.  IV sedation was given per anesthesia.  Gastroscope was introduced and passed into the duodenum.  The scope was slowly withdrawn.  I examined the duodenum, stomach and esophagus thoroughly.  Biopsies were taken as indicated above.  Findings are listed as above.    Patient was taken to the operating room and placed in a left lateral decubitus position.  IV sedation was given.  Colonoscope was introduced and passed all the way to cecum.  The scope was slowly withdrawn.  Cecum, ascending colon, hepatic flexure, transverse colon, splenic flexure, descending colon, sigmoid colon, and rectum were all examined.  Colon was normal throughout except for findings of extensive diverticulosis in sigmoid colon.  There is no evidence of bleeding.  Digital rectal exam was unremarkable.  The procedures and terminated.  Patient tolerated procedure very well and was returned recovery room in satisfactory condition .    Patient will be discharged home at recovery and will be seen back in 1 week.  His start back on his Coumadin at normal  doses.      Electronically Signed by: MD Jessica Perry disclaimer:  Much of this encounter note is an electronic transcription/translation of spoken language to printed text. The electronic translation of spoken language may permit erroneous, or at times, nonsensical words or phrases to be inadvertently transcribed; Although I have reviewed the note for such errors, some may still exist.

## 2017-09-08 NOTE — ANESTHESIA PREPROCEDURE EVALUATION
Anesthesia Evaluation     Patient summary reviewed and Nursing notes reviewed   no history of anesthetic complications:  NPO Solid Status: > 8 hours  NPO Liquid Status: > 8 hours     Airway   Mallampati: II  TM distance: >3 FB  Neck ROM: full  no difficulty expected  Dental - normal exam   (+) edentulous    Pulmonary - normal exam   (+) a smoker Former, sleep apnea,   (-) asthma  Cardiovascular - normal exam  Exercise tolerance: good (4-7 METS)    NYHA Classification: II  Patient on routine beta blocker and Beta blocker given within 24 hours of surgery    (+) pacemaker pacemaker, ICD interrogated <1 month ago, hypertension, CABG, dysrhythmias, CHF,   (-) angina      Neuro/Psych  (+) CVA (1999),    (-) seizures  GI/Hepatic/Renal/Endo - negative ROS   (-) GERD, liver disease, no renal disease, diabetes, hypothyroidism    Musculoskeletal (-) negative ROS    (-) back pain, neck pain  Abdominal  - normal exam    Bowel sounds: normal.   Substance History - negative use     OB/GYN negative ob/gyn ROS         Other - negative ROS                                       Anesthesia Plan    ASA 3     general     intravenous induction   Anesthetic plan and risks discussed with patient and spouse/significant other.  Use of blood products discussed with patient and spouse/significant other  Consented to blood products.

## 2017-09-08 NOTE — DISCHARGE INSTRUCTIONS
General Anesthesia, Adult, Care After  Refer to this sheet in the next few weeks. These instructions provide you with information on caring for yourself after your procedure. Your health care provider may also give you more specific instructions. Your treatment has been planned according to current medical practices, but problems sometimes occur. Call your health care provider if you have any problems or questions after your procedure.  WHAT TO EXPECT AFTER THE PROCEDURE  After the procedure, it is typical to experience:  · Sleepiness.  · Nausea and vomiting.  HOME CARE INSTRUCTIONS  · For the first 24 hours after general anesthesia:  ¨ Have a responsible person with you.  ¨ Do not drive a car. If you are alone, do not take public transportation.  ¨ Do not drink alcohol.  ¨ Do not take medicine that has not been prescribed by your health care provider.  ¨ Do not sign important papers or make important decisions.  ¨ You may resume a normal diet and activities as directed by your health care provider.  · Change bandages (dressings) as directed.  · If you have questions or problems that seem related to general anesthesia, call the hospital and ask for the anesthetist or anesthesiologist on call.  SEEK MEDICAL CARE IF:  · You have nausea and vomiting that continue the day after anesthesia.  · You develop a rash.  SEEK IMMEDIATE MEDICAL CARE IF:    · You have difficulty breathing.  · You have chest pain.  · You have any allergic problems.  This information is not intended to replace advice given to you by your health care provider. Make sure you discuss any questions you have with your health care provider.  Document Released: 03/26/2002 Document Revised: 01/08/2016 Document Reviewed: 07/03/2014  [employer] Interactive Patient Education ©2016 [employer]  Esophagogastroduodenoscopy, Care After  Refer to this sheet in the next few weeks. These instructions provide you with information about caring for yourself after your  procedure. Your health care provider may also give you more specific instructions. Your treatment has been planned according to current medical practices, but problems sometimes occur. Call your health care provider if you have any problems or questions after your procedure.  WHAT TO EXPECT AFTER THE PROCEDURE  After your procedure, it is typical to feel:  · Soreness in your throat.  · Pain with swallowing.  · Sick to your stomach (nauseous).  · Bloated.  · Dizzy.  · Fatigued.  HOME CARE INSTRUCTIONS  · Do not eat or drink anything until the numbing medicine (local anesthetic) has worn off and your gag reflex has returned. You will know that the local anesthetic has worn off when you can swallow comfortably.  · Do not drive or operate machinery until directed by your health care provider.  · Take medicines only as directed by your health care provider.  SEEK MEDICAL CARE IF:    · You cannot stop coughing.  · You are not urinating at all or less than usual.  SEEK IMMEDIATE MEDICAL CARE IF:  · You have difficulty swallowing.  · You cannot eat or drink.  · You have worsening throat or chest pain.  · You have dizziness or lightheadedness or you faint.  · You have nausea or vomiting.  · You have chills.  · You have a fever.  · You have severe abdominal pain.  · You have black, tarry, or bloody stools.  This information is not intended to replace advice given to you by your health care provider. Make sure you discuss any questions you have with your health care provider.  Document Released: 12/04/2013 Document Revised: 01/08/2016 Document Reviewed: 12/04/2013  [employer] Interactive Patient Education ©2016 [employer]  Colonoscopy, Care After  Refer to this sheet in the next few weeks. These instructions provide you with information on caring for yourself after your procedure. Your health care provider may also give you more specific instructions. Your treatment has been planned according to current medical practices, but  problems sometimes occur. Call your health care provider if you have any problems or questions after your procedure.  WHAT TO EXPECT AFTER THE PROCEDURE    After your procedure, it is typical to have the following:  · A small amount of blood in your stool.  · Moderate amounts of gas and mild abdominal cramping or bloating.  HOME CARE INSTRUCTIONS  · Do not drive, operate machinery, or sign important documents for 24 hours.  · You may shower and resume your regular physical activities, but move at a slower pace for the first 24 hours.  · Take frequent rest periods for the first 24 hours.  · Walk around or put a warm pack on your abdomen to help reduce abdominal cramping and bloating.  · Drink enough fluids to keep your urine clear or pale yellow.  · You may resume your normal diet as instructed by your health care provider. Avoid heavy or fried foods that are hard to digest.  · Avoid drinking alcohol for 24 hours or as instructed by your health care provider.  · Only take over-the-counter or prescription medicines as directed by your health care provider.  · If a tissue sample (biopsy) was taken during your procedure:  ¨ Do not take aspirin or blood thinners for 7 days, or as instructed by your health care provider.  ¨ Do not drink alcohol for 7 days, or as instructed by your health care provider.  ¨ Eat soft foods for the first 24 hours.  SEEK MEDICAL CARE IF:  You have persistent spotting of blood in your stool 2-3 days after the procedure.  SEEK IMMEDIATE MEDICAL CARE IF:  · You have more than a small spotting of blood in your stool.  · You pass large blood clots in your stool.  · Your abdomen is swollen (distended).  · You have nausea or vomiting.  · You have a fever.  · You have increasing abdominal pain that is not relieved with medicine.  This information is not intended to replace advice given to you by your health care provider. Make sure you discuss any questions you have with your health care  provider.  Document Released: 08/01/2005 Document Revised: 10/08/2014 Document Reviewed: 08/25/2014  [employer] Interactive Patient Education ©2016 [employer]  Diverticulosis    Diverticulosis is the condition that develops when small pouches (diverticula) form in the wall of your colon. Your colon, or large intestine, is where water is absorbed and stool is formed. The pouches form when the inside layer of your colon pushes through weak spots in the outer layers of your colon.  CAUSES    No one knows exactly what causes diverticulosis.  RISK FACTORS  · Being older than 50. Your risk for this condition increases with age. Diverticulosis is rare in people younger than 40 years. By age 80, almost everyone has it.  · Eating a low-fiber diet.  · Being frequently constipated.  · Being overweight.  · Not getting enough exercise.  · Smoking.  · Taking over-the-counter pain medicines, like aspirin and ibuprofen.  SYMPTOMS    Most people with diverticulosis do not have symptoms.  DIAGNOSIS    Because diverticulosis often has no symptoms, health care providers often discover the condition during an exam for other colon problems. In many cases, a health care provider will diagnose diverticulosis while using a flexible scope to examine the colon (colonoscopy).  TREATMENT    If you have never developed an infection related to diverticulosis, you may not need treatment. If you have had an infection before, treatment may include:  · Eating more fruits, vegetables, and grains.  · Taking a fiber supplement.  · Taking a live bacteria supplement (probiotic).  · Taking medicine to relax your colon.  HOME CARE INSTRUCTIONS    · Drink at least 6-8 glasses of water each day to prevent constipation.  · Try not to strain when you have a bowel movement.  · Keep all follow-up appointments.  If you have had an infection before:   · Increase the fiber in your diet as directed by your health care provider or dietitian.  · Take a dietary fiber  supplement if your health care provider approves.  · Only take medicines as directed by your health care provider.  SEEK MEDICAL CARE IF:    · You have abdominal pain.  · You have bloating.  · You have cramps.  · You have not gone to the bathroom in 3 days.  SEEK IMMEDIATE MEDICAL CARE IF:    · Your pain gets worse.  · Your bloating becomes very bad.  · You have a fever or chills, and your symptoms suddenly get worse.  · You begin vomiting.  · You have bowel movements that are bloody or black.  MAKE SURE YOU:  · Understand these instructions.  · Will watch your condition.  · Will get help right away if you are not doing well or get worse.  This information is not intended to replace advice given to you by your health care provider. Make sure you discuss any questions you have with your health care provider.  Document Released: 09/14/2005 Document Revised: 12/23/2014 Document Reviewed: 11/12/2014  [employer] Interactive Patient Education ©2016 [employer]

## 2017-09-11 PROBLEM — D50.0 IRON DEFICIENCY ANEMIA DUE TO CHRONIC BLOOD LOSS: Status: ACTIVE | Noted: 2017-09-05

## 2017-09-12 LAB
LAB AP CASE REPORT: NORMAL
Lab: NORMAL
PATH REPORT.FINAL DX SPEC: NORMAL

## 2017-11-06 ENCOUNTER — HOSPITAL ENCOUNTER (EMERGENCY)
Facility: HOSPITAL | Age: 77
Discharge: HOME | End: 2017-11-06
Attending: EMERGENCY MEDICINE | Admitting: INTERNAL MEDICINE

## 2017-11-06 VITALS
WEIGHT: 195 LBS | HEART RATE: 73 BPM | OXYGEN SATURATION: 99 % | TEMPERATURE: 98.9 F | RESPIRATION RATE: 17 BRPM | BODY MASS INDEX: 27.92 KG/M2 | SYSTOLIC BLOOD PRESSURE: 139 MMHG | HEIGHT: 70 IN | DIASTOLIC BLOOD PRESSURE: 72 MMHG

## 2017-11-06 DIAGNOSIS — D68.9 BLOOD CLOTTING DISORDER (HCC): Primary | ICD-10-CM

## 2017-11-06 PROCEDURE — 99283 EMERGENCY DEPT VISIT LOW MDM: CPT

## 2017-11-06 RX ORDER — CEPHALEXIN 250 MG/1
500 CAPSULE ORAL ONCE
Status: COMPLETED | OUTPATIENT
Start: 2017-11-06 | End: 2017-11-06

## 2017-11-06 RX ORDER — CEPHALEXIN 500 MG/1
500 CAPSULE ORAL 2 TIMES DAILY
Qty: 20 CAPSULE | Refills: 0 | Status: SHIPPED | OUTPATIENT
Start: 2017-11-06 | End: 2021-03-16

## 2017-11-06 RX ADMIN — SILVER NITRATE APPLICATORS 1 APPLICATION: 25; 75 STICK TOPICAL at 19:56

## 2017-11-06 RX ADMIN — CEPHALEXIN 500 MG: 250 CAPSULE ORAL at 20:14

## 2017-11-06 RX ADMIN — SILVER NITRATE APPLICATORS 1 APPLICATION: 25; 75 STICK TOPICAL at 20:16

## 2017-11-07 NOTE — ED NOTES
Area was washed to reveal a partial clotted varicose on top of foot clotted blood removed and foot cleaned. No other areas of concern was observed     Gopal Zhu RN  11/06/17 2001

## 2017-11-07 NOTE — ED PROVIDER NOTES
Subjective   HPI Comments: 77-year-old white male presented to ER with complaints of bleeding.  Patient admitted that earlier tonight he removed his sock on his right foot and started to bleed from a sore.  Patient is on anticoagulation therapy, Coumadin 5 mg daily secondary to A. Fib.  Attempts with pressure were unsuccessful in stopping the bleeding.  Patient wrapped his foot with Saran wrap and presented to ER.      Review of Systems   Constitutional: Negative.  Negative for fever.   HENT: Negative.    Respiratory: Negative.    Cardiovascular: Negative.  Negative for chest pain.   Gastrointestinal: Negative.  Negative for abdominal pain.   Endocrine: Negative.    Genitourinary: Negative.  Negative for dysuria.   Skin: Negative.    Neurological: Negative.    Psychiatric/Behavioral: Negative.    All other systems reviewed and are negative.      Past Medical History:   Diagnosis Date   • Atrial fibrillation    • CHF (congestive heart failure)    • Heart disease    • Hypertension        No Known Allergies    Past Surgical History:   Procedure Laterality Date   • ANKLE SURGERY     • CARDIAC PACEMAKER PLACEMENT     • CATARACT EXTRACTION     • COLONOSCOPY N/A 9/8/2017    Procedure: COLONOSCOPY;  Surgeon: Magan Hu MD;  Location: [facility];  Service:    • CORONARY ARTERY BYPASS GRAFT     • ENDOSCOPY N/A 9/8/2017    Procedure: ESOPHAGOGASTRODUODENOSCOPY;  Surgeon: Magan Hu MD;  Location: [facility];  Service:    • HERNIA REPAIR     • HYDROCELE EXCISION / REPAIR     • KNEE SURGERY         History reviewed. No pertinent family history.    Social History     Social History   • Marital status:      Spouse name: N/A   • Number of children: N/A   • Years of education: N/A     Social History Main Topics   • Smoking status: Never Smoker   • Smokeless tobacco: None   • Alcohol use No   • Drug use: No   • Sexual activity: Defer     Other Topics Concern   • None     Social History Narrative           Objective    Physical Exam   Constitutional: He is oriented to person, place, and time. He appears well-developed and well-nourished. No distress.   HENT:   Head: Normocephalic and atraumatic.   Nose: Nose normal.   Eyes: Conjunctivae and EOM are normal. Pupils are equal, round, and reactive to light.   Neck: Normal range of motion. Neck supple. No JVD present. No tracheal deviation present.   Cardiovascular: Normal rate and normal heart sounds.    No murmur heard.  irregularly irregular    Pulmonary/Chest: Effort normal and breath sounds normal. No respiratory distress. He has no wheezes.   Abdominal: Soft. Bowel sounds are normal. There is no tenderness.   Musculoskeletal: Normal range of motion. He exhibits no edema or deformity.   Neurological: He is alert and oriented to person, place, and time. No cranial nerve deficit.   Skin: Skin is warm and dry. No rash noted. He is not diaphoretic. No erythema. No pallor.   Bleeding noted to the lateral dorsal side of right foot.   Psychiatric: He has a normal mood and affect. His behavior is normal. Thought content normal.   Nursing note and vitals reviewed.      Procedures         ED Course  ED Course   Comment By Time   Wound to right foot was irrigated and cleaned.  Silver nitrate stick applied, and was able to successfully cauterize wound. ALEX Fermin 11/07 0023                  MDM  Number of Diagnoses or Management Options  minor  Risk of Complications, Morbidity, and/or Mortality  Presenting problems: low  Diagnostic procedures: low  Management options: low    Patient Progress  Patient progress: stable      Final diagnoses:   Blood clotting disorder            ALEX Fermin  11/07/17 0023       ALEX Fermin  11/07/17 0024

## 2017-11-09 ENCOUNTER — HOSPITAL ENCOUNTER (EMERGENCY)
Facility: HOSPITAL | Age: 77
Discharge: LEFT BEFORE DISCHARGE | End: 2017-11-09
Attending: EMERGENCY MEDICINE

## 2017-11-09 VITALS
HEART RATE: 70 BPM | RESPIRATION RATE: 18 BRPM | BODY MASS INDEX: 27.92 KG/M2 | OXYGEN SATURATION: 99 % | WEIGHT: 195 LBS | TEMPERATURE: 98.3 F | HEIGHT: 70 IN | DIASTOLIC BLOOD PRESSURE: 84 MMHG | SYSTOLIC BLOOD PRESSURE: 145 MMHG

## 2017-11-09 PROCEDURE — 99211 OFF/OP EST MAY X REQ PHY/QHP: CPT

## 2017-11-09 NOTE — ED NOTES
"I tried to explain to the patient there wasn't an area that needed \"opened\". I explained that due to the scab and on coumadin. The patient thought we would remove the scab and cauterize the area. Nuvia tried to explain, but patient just said \"i will leave and go somewhere else\"     Gopal Zhu RN  11/09/17 6819    "

## 2020-06-10 ENCOUNTER — HOSPITAL ENCOUNTER (EMERGENCY)
Facility: HOSPITAL | Age: 80
Discharge: HOME | End: 2020-06-10
Attending: EMERGENCY MEDICINE | Admitting: EMERGENCY MEDICINE

## 2020-06-10 VITALS
RESPIRATION RATE: 16 BRPM | HEART RATE: 67 BPM | BODY MASS INDEX: 27.44 KG/M2 | HEIGHT: 71 IN | SYSTOLIC BLOOD PRESSURE: 116 MMHG | DIASTOLIC BLOOD PRESSURE: 99 MMHG | WEIGHT: 196 LBS | TEMPERATURE: 98.6 F | OXYGEN SATURATION: 97 %

## 2020-06-10 DIAGNOSIS — I83.899 BLEEDING FROM VARICOSE VEIN: Primary | ICD-10-CM

## 2020-06-10 LAB
ALBUMIN SERPL-MCNC: 3.73 G/DL (ref 3.5–5.2)
ALBUMIN/GLOB SERPL: 1.5 G/DL
ALP SERPL-CCNC: 77 U/L (ref 39–117)
ALT SERPL W P-5'-P-CCNC: 5 U/L (ref 1–41)
ANION GAP SERPL CALCULATED.3IONS-SCNC: 8.1 MMOL/L (ref 5–15)
AST SERPL-CCNC: 17 U/L (ref 1–40)
BASOPHILS # BLD AUTO: 0.05 10*3/MM3 (ref 0–0.2)
BASOPHILS NFR BLD AUTO: 0.7 % (ref 0–1.5)
BILIRUB SERPL-MCNC: 1.1 MG/DL (ref 0.2–1.2)
BUN BLD-MCNC: 23 MG/DL (ref 8–23)
BUN/CREAT SERPL: 18.4 (ref 7–25)
CALCIUM SPEC-SCNC: 8.7 MG/DL (ref 8.6–10.5)
CHLORIDE SERPL-SCNC: 102 MMOL/L (ref 98–107)
CO2 SERPL-SCNC: 30.9 MMOL/L (ref 22–29)
CREAT BLD-MCNC: 1.25 MG/DL (ref 0.76–1.27)
DEPRECATED RDW RBC AUTO: 51.6 FL (ref 37–54)
EOSINOPHIL # BLD AUTO: 0.17 10*3/MM3 (ref 0–0.4)
EOSINOPHIL NFR BLD AUTO: 2.3 % (ref 0.3–6.2)
ERYTHROCYTE [DISTWIDTH] IN BLOOD BY AUTOMATED COUNT: 15.4 % (ref 12.3–15.4)
GFR SERPL CREATININE-BSD FRML MDRD: 56 ML/MIN/1.73
GLOBULIN UR ELPH-MCNC: 2.6 GM/DL
GLUCOSE BLD-MCNC: 161 MG/DL (ref 65–99)
HCT VFR BLD AUTO: 35.1 % (ref 37.5–51)
HGB BLD-MCNC: 11.3 G/DL (ref 13–17.7)
IMM GRANULOCYTES # BLD AUTO: 0.03 10*3/MM3 (ref 0–0.05)
IMM GRANULOCYTES NFR BLD AUTO: 0.4 % (ref 0–0.5)
INR PPP: 1.85 (ref 0.9–1.1)
LYMPHOCYTES # BLD AUTO: 0.99 10*3/MM3 (ref 0.7–3.1)
LYMPHOCYTES NFR BLD AUTO: 13.5 % (ref 19.6–45.3)
MCH RBC QN AUTO: 29.5 PG (ref 26.6–33)
MCHC RBC AUTO-ENTMCNC: 32.2 G/DL (ref 31.5–35.7)
MCV RBC AUTO: 91.6 FL (ref 79–97)
MONOCYTES # BLD AUTO: 0.8 10*3/MM3 (ref 0.1–0.9)
MONOCYTES NFR BLD AUTO: 10.9 % (ref 5–12)
NEUTROPHILS # BLD AUTO: 5.29 10*3/MM3 (ref 1.7–7)
NEUTROPHILS NFR BLD AUTO: 72.2 % (ref 42.7–76)
NRBC BLD AUTO-RTO: 0 /100 WBC (ref 0–0.2)
PLATELET # BLD AUTO: 165 10*3/MM3 (ref 140–450)
PMV BLD AUTO: 10.5 FL (ref 6–12)
POTASSIUM BLD-SCNC: 3.4 MMOL/L (ref 3.5–5.2)
PROT SERPL-MCNC: 6.3 G/DL (ref 6–8.5)
PROTHROMBIN TIME: 21.1 SECONDS (ref 11.9–14.1)
RBC # BLD AUTO: 3.83 10*6/MM3 (ref 4.14–5.8)
SODIUM BLD-SCNC: 141 MMOL/L (ref 136–145)
WBC NRBC COR # BLD: 7.33 10*3/MM3 (ref 3.4–10.8)

## 2020-06-10 PROCEDURE — 85610 PROTHROMBIN TIME: CPT | Performed by: EMERGENCY MEDICINE

## 2020-06-10 PROCEDURE — 25010000002 COCAINE HCL 40 MG/ML SOLUTION: Performed by: EMERGENCY MEDICINE

## 2020-06-10 PROCEDURE — 80053 COMPREHEN METABOLIC PANEL: CPT | Performed by: EMERGENCY MEDICINE

## 2020-06-10 PROCEDURE — 99284 EMERGENCY DEPT VISIT MOD MDM: CPT

## 2020-06-10 PROCEDURE — C9046 COCAINE HCL NASAL SOLUTION: HCPCS | Performed by: EMERGENCY MEDICINE

## 2020-06-10 PROCEDURE — 85025 COMPLETE CBC W/AUTO DIFF WBC: CPT | Performed by: EMERGENCY MEDICINE

## 2020-06-10 RX ORDER — COCAINE HYDROCHLORIDE 40 MG/ML
SOLUTION NASAL ONCE
Status: COMPLETED | OUTPATIENT
Start: 2020-06-10 | End: 2020-06-10

## 2020-06-10 RX ORDER — POTASSIUM CHLORIDE 20 MEQ/1
40 TABLET, EXTENDED RELEASE ORAL ONCE
Status: COMPLETED | OUTPATIENT
Start: 2020-06-10 | End: 2020-06-10

## 2020-06-10 RX ADMIN — POTASSIUM CHLORIDE 40 MEQ: 1500 TABLET, EXTENDED RELEASE ORAL at 21:24

## 2020-06-10 RX ADMIN — COCAINE HYDROCHLORIDE: 40 SOLUTION NASAL at 20:02

## 2020-06-10 RX ADMIN — SODIUM CHLORIDE 1000 ML: 9 INJECTION, SOLUTION INTRAVENOUS at 20:01

## 2020-06-10 RX ADMIN — SILVER NITRATE APPLICATORS 1 APPLICATION: 25; 75 STICK TOPICAL at 21:24

## 2020-06-11 NOTE — ED PROVIDER NOTES
Subjective   Patient presents to Er with bleeding wound right lower leg. He has history varicose veins.      Abrasion   Location:  Right ankle varicose vein bleeding  Severity:  Moderate  Onset quality:  Gradual  Timing:  Constant  Chronicity:  New  Context:  On coumadin      Review of Systems   Constitutional: Positive for activity change.   HENT: Negative.    Eyes: Negative.    Respiratory: Negative.    Cardiovascular: Negative.    Gastrointestinal: Negative.    Endocrine: Negative.    Genitourinary: Negative.    Musculoskeletal: Negative.    Skin:        Wound right lower leg, bleeding   Allergic/Immunologic: Negative.    Neurological: Negative.    Hematological: Negative.    Psychiatric/Behavioral: Negative.        Past Medical History:   Diagnosis Date   • Atrial fibrillation (CMS/HCC)    • CHF (congestive heart failure) (CMS/HCC)    • Heart disease    • Hypertension        Allergies   Allergen Reactions   • Iodine Hives       Past Surgical History:   Procedure Laterality Date   • ANKLE SURGERY     • CARDIAC PACEMAKER PLACEMENT     • CATARACT EXTRACTION     • COLONOSCOPY N/A 9/8/2017    Procedure: COLONOSCOPY;  Surgeon: Magan Hu MD;  Location: [facility];  Service:    • CORONARY ARTERY BYPASS GRAFT     • ENDOSCOPY N/A 9/8/2017    Procedure: ESOPHAGOGASTRODUODENOSCOPY;  Surgeon: Magan Hu MD;  Location: [facility];  Service:    • HERNIA REPAIR     • HYDROCELE EXCISION / REPAIR     • KNEE SURGERY         No family history on file.    Social History     Socioeconomic History   • Marital status:      Spouse name: Not on file   • Number of children: Not on file   • Years of education: Not on file   • Highest education level: Not on file   Tobacco Use   • Smoking status: Never Smoker   Substance and Sexual Activity   • Alcohol use: No   • Drug use: No   • Sexual activity: Defer           Objective   Physical Exam   Constitutional: He appears well-developed and well-nourished.   HENT:   Head:  Normocephalic.   Eyes: Pupils are equal, round, and reactive to light.   Neck: Normal range of motion.   Cardiovascular: Normal rate and regular rhythm.   Pulmonary/Chest: Effort normal.   Abdominal: Soft.   Musculoskeletal: Normal range of motion.   Neurological: He is alert.   Skin:   Bleeding varicose vein right lower extremity   Psychiatric: He has a normal mood and affect.   Nursing note and vitals reviewed.      Procedures           ED Course  ED Course as of Rk 10 2119   Wed Rk 10, 2020   2115 Procedure: bleeding wound right ankle applied 4% cocaine solution on cotton ball, bleeding controlled.    [LISANDRA]      ED Course User Index  [LISANDRA] Chava Sheikh MD                                           [facility]    Final diagnoses:   Bleeding from varicose vein            Chava Sheikh MD  06/10/20 2119

## 2020-06-11 NOTE — ED NOTES
"Patient presents to the ED complaining of left lower leg ulcer bleeding, Patient states, \"I was at home and I was taking my socks off and I must have hit a ulcer and blood started to go everywhere. I am on coumadin, and I have these all of the time.\" Patient presents with trash bag around left lower leg, with noticeable pooling of bright red blood. Provider made aware and at bedside. Trash bag cut off of patients leg, bleeding controlled at this time. Patients leg cleaned up at this time. Family at bedside and made aware of situation. DEONTE. WCTM     Stella Lundy, RN  06/10/20 2006       Stella Lundy RN  06/10/20 2009    "

## 2020-06-11 NOTE — ED NOTES
Patients leg cleaned at this time, and dressed with nonadherent gauze. Patient and family verbalized understanding of follow up instructions and dressing care. No active bleeding noted. Skin WPD. NADN. Stella Gale RN  06/10/20 6143

## 2020-08-05 ENCOUNTER — TRANSCRIBE ORDERS (OUTPATIENT)
Dept: ADMINISTRATIVE | Facility: HOSPITAL | Age: 80
End: 2020-08-05

## 2020-08-05 ENCOUNTER — HOSPITAL ENCOUNTER (OUTPATIENT)
Dept: GENERAL RADIOLOGY | Facility: HOSPITAL | Age: 80
Discharge: HOME | End: 2020-08-05
Admitting: INTERNAL MEDICINE

## 2020-08-05 DIAGNOSIS — J90 PLEURAL EFFUSION: Primary | ICD-10-CM

## 2020-08-05 DIAGNOSIS — J90 PLEURAL EFFUSION: ICD-10-CM

## 2020-08-05 PROCEDURE — 71046 X-RAY EXAM CHEST 2 VIEWS: CPT | Performed by: RADIOLOGY

## 2020-08-05 PROCEDURE — 71046 X-RAY EXAM CHEST 2 VIEWS: CPT

## 2020-12-22 ENCOUNTER — TRANSCRIBE ORDERS (OUTPATIENT)
Dept: ADMINISTRATIVE | Facility: HOSPITAL | Age: 80
End: 2020-12-22

## 2020-12-22 DIAGNOSIS — N17.9 ACUTE RENAL FAILURE, UNSPECIFIED ACUTE RENAL FAILURE TYPE (HCC): Primary | ICD-10-CM

## 2021-01-18 ENCOUNTER — LAB (OUTPATIENT)
Dept: LAB | Facility: HOSPITAL | Age: 81
End: 2021-01-18

## 2021-01-18 ENCOUNTER — TRANSCRIBE ORDERS (OUTPATIENT)
Dept: ADMINISTRATIVE | Facility: HOSPITAL | Age: 81
End: 2021-01-18

## 2021-01-18 ENCOUNTER — HOSPITAL ENCOUNTER (OUTPATIENT)
Dept: ULTRASOUND IMAGING | Facility: HOSPITAL | Age: 81
Discharge: HOME | End: 2021-01-18

## 2021-01-18 DIAGNOSIS — N17.9 ACUTE RENAL FAILURE, UNSPECIFIED ACUTE RENAL FAILURE TYPE (HCC): ICD-10-CM

## 2021-01-18 DIAGNOSIS — M10.9 GOUT, ARTHRITIS: ICD-10-CM

## 2021-01-18 DIAGNOSIS — N17.9 ACUTE RENAL FAILURE, UNSPECIFIED ACUTE RENAL FAILURE TYPE (HCC): Primary | ICD-10-CM

## 2021-01-18 LAB
ALBUMIN SERPL-MCNC: 4 G/DL (ref 3.5–5.2)
ALBUMIN/GLOB SERPL: 1.5 G/DL
ALP SERPL-CCNC: 123 U/L (ref 39–117)
ALT SERPL W P-5'-P-CCNC: 36 U/L (ref 1–41)
ANION GAP SERPL CALCULATED.3IONS-SCNC: 9.9 MMOL/L (ref 5–15)
AST SERPL-CCNC: 55 U/L (ref 1–40)
BILIRUB SERPL-MCNC: 4.1 MG/DL (ref 0–1.2)
BILIRUB UR QL STRIP: ABNORMAL
BUN SERPL-MCNC: 28 MG/DL (ref 8–23)
BUN/CREAT SERPL: 22.4 (ref 7–25)
CALCIUM SPEC-SCNC: 8.5 MG/DL (ref 8.6–10.5)
CHLORIDE SERPL-SCNC: 98 MMOL/L (ref 98–107)
CLARITY UR: ABNORMAL
CO2 SERPL-SCNC: 31.1 MMOL/L (ref 22–29)
COLOR UR: ABNORMAL
CREAT SERPL-MCNC: 1.25 MG/DL (ref 0.76–1.27)
CREAT UR-MCNC: 166.4 MG/DL
EOSINOPHIL SPEC QL MICRO: 0 % EOS/100 CELLS (ref 0–0)
GFR SERPL CREATININE-BSD FRML MDRD: 56 ML/MIN/1.73
GLOBULIN UR ELPH-MCNC: 2.6 GM/DL
GLUCOSE SERPL-MCNC: 110 MG/DL (ref 65–99)
GLUCOSE UR STRIP-MCNC: NEGATIVE MG/DL
HGB UR QL STRIP.AUTO: NEGATIVE
KETONES UR QL STRIP: NEGATIVE
LEUKOCYTE ESTERASE UR QL STRIP.AUTO: NEGATIVE
NITRITE UR QL STRIP: NEGATIVE
PH UR STRIP.AUTO: 5.5 [PH] (ref 5–8)
POTASSIUM SERPL-SCNC: 3.2 MMOL/L (ref 3.5–5.2)
PROT SERPL-MCNC: 6.6 G/DL (ref 6–8.5)
PROT UR QL STRIP: ABNORMAL
PROT UR-MCNC: 22 MG/DL
PROT/CREAT UR: 132.2 MG/G CREA (ref 0–200)
SODIUM SERPL-SCNC: 139 MMOL/L (ref 136–145)
SP GR UR STRIP: 1.02 (ref 1–1.03)
URATE SERPL-MCNC: 11.4 MG/DL (ref 3.4–7)
UROBILINOGEN UR QL STRIP: ABNORMAL

## 2021-01-18 PROCEDURE — 76775 US EXAM ABDO BACK WALL LIM: CPT

## 2021-01-18 PROCEDURE — 84156 ASSAY OF PROTEIN URINE: CPT

## 2021-01-18 PROCEDURE — 76775 US EXAM ABDO BACK WALL LIM: CPT | Performed by: RADIOLOGY

## 2021-01-18 PROCEDURE — 81003 URINALYSIS AUTO W/O SCOPE: CPT

## 2021-01-18 PROCEDURE — 87205 SMEAR GRAM STAIN: CPT

## 2021-01-18 PROCEDURE — 82570 ASSAY OF URINE CREATININE: CPT

## 2021-01-18 PROCEDURE — 80053 COMPREHEN METABOLIC PANEL: CPT

## 2021-01-18 PROCEDURE — 84550 ASSAY OF BLOOD/URIC ACID: CPT

## 2021-01-18 PROCEDURE — 36415 COLL VENOUS BLD VENIPUNCTURE: CPT

## 2021-01-25 ENCOUNTER — HOSPITAL ENCOUNTER (OUTPATIENT)
Dept: HOSPITAL 79 - EXRD | Age: 81
End: 2021-01-25
Attending: INTERNAL MEDICINE
Payer: MEDICARE

## 2021-01-25 DIAGNOSIS — J93.9: ICD-10-CM

## 2021-01-25 DIAGNOSIS — N18.30: Primary | ICD-10-CM

## 2021-03-16 ENCOUNTER — CONSULT (OUTPATIENT)
Dept: CARDIOLOGY | Facility: CLINIC | Age: 81
End: 2021-03-16

## 2021-03-16 ENCOUNTER — LAB (OUTPATIENT)
Dept: LAB | Facility: HOSPITAL | Age: 81
End: 2021-03-16

## 2021-03-16 VITALS
SYSTOLIC BLOOD PRESSURE: 122 MMHG | HEART RATE: 71 BPM | DIASTOLIC BLOOD PRESSURE: 60 MMHG | BODY MASS INDEX: 27.92 KG/M2 | HEIGHT: 70 IN | WEIGHT: 195 LBS

## 2021-03-16 DIAGNOSIS — I48.21 PERMANENT ATRIAL FIBRILLATION (HCC): ICD-10-CM

## 2021-03-16 DIAGNOSIS — I27.20 PULMONARY HYPERTENSION (HCC): ICD-10-CM

## 2021-03-16 DIAGNOSIS — I50.22 CHRONIC SYSTOLIC CONGESTIVE HEART FAILURE (HCC): Primary | ICD-10-CM

## 2021-03-16 DIAGNOSIS — I50.22 CHRONIC SYSTOLIC CONGESTIVE HEART FAILURE (HCC): ICD-10-CM

## 2021-03-16 LAB
ALBUMIN SERPL-MCNC: 3.7 G/DL (ref 3.5–5.2)
ALBUMIN/GLOB SERPL: 1.4 G/DL
ALP SERPL-CCNC: 88 U/L (ref 39–117)
ALT SERPL W P-5'-P-CCNC: 11 U/L (ref 1–41)
ANION GAP SERPL CALCULATED.3IONS-SCNC: 8.4 MMOL/L (ref 5–15)
AST SERPL-CCNC: 17 U/L (ref 1–40)
BILIRUB SERPL-MCNC: 0.9 MG/DL (ref 0–1.2)
BUN SERPL-MCNC: 29 MG/DL (ref 8–23)
BUN/CREAT SERPL: 23.8 (ref 7–25)
CALCIUM SPEC-SCNC: 8.4 MG/DL (ref 8.6–10.5)
CHLORIDE SERPL-SCNC: 98 MMOL/L (ref 98–107)
CO2 SERPL-SCNC: 31.6 MMOL/L (ref 22–29)
CREAT SERPL-MCNC: 1.22 MG/DL (ref 0.76–1.27)
GFR SERPL CREATININE-BSD FRML MDRD: 57 ML/MIN/1.73
GLOBULIN UR ELPH-MCNC: 2.7 GM/DL
GLUCOSE SERPL-MCNC: 83 MG/DL (ref 65–99)
MAGNESIUM SERPL-MCNC: 2.1 MG/DL (ref 1.6–2.4)
POTASSIUM SERPL-SCNC: 3.1 MMOL/L (ref 3.5–5.2)
PROT SERPL-MCNC: 6.4 G/DL (ref 6–8.5)
SODIUM SERPL-SCNC: 138 MMOL/L (ref 136–145)

## 2021-03-16 PROCEDURE — 93284 PRGRMG EVAL IMPLANTABLE DFB: CPT | Performed by: INTERNAL MEDICINE

## 2021-03-16 PROCEDURE — 80053 COMPREHEN METABOLIC PANEL: CPT

## 2021-03-16 PROCEDURE — 83735 ASSAY OF MAGNESIUM: CPT

## 2021-03-16 PROCEDURE — 99205 OFFICE O/P NEW HI 60 MIN: CPT | Performed by: INTERNAL MEDICINE

## 2021-03-16 PROCEDURE — 36415 COLL VENOUS BLD VENIPUNCTURE: CPT

## 2021-03-16 RX ORDER — ALLOPURINOL 100 MG/1
300 TABLET ORAL DAILY
COMMUNITY

## 2021-03-16 RX ORDER — SPIRONOLACTONE 25 MG/1
25 TABLET ORAL DAILY
Qty: 30 TABLET | Refills: 11 | Status: SHIPPED | OUTPATIENT
Start: 2021-03-16 | End: 2021-03-24 | Stop reason: SDUPTHER

## 2021-03-16 RX ORDER — LOSARTAN POTASSIUM 25 MG/1
25 TABLET ORAL DAILY
COMMUNITY
End: 2021-08-25

## 2021-03-16 RX ORDER — BUMETANIDE 1 MG/1
1 TABLET ORAL DAILY
Qty: 60 TABLET | Refills: 11 | Status: SHIPPED | OUTPATIENT
Start: 2021-03-16 | End: 2021-03-24 | Stop reason: SDUPTHER

## 2021-03-16 RX ORDER — BUMETANIDE 1 MG/1
1 TABLET ORAL DAILY
Qty: 60 TABLET | Refills: 11 | Status: SHIPPED | OUTPATIENT
Start: 2021-03-16 | End: 2021-03-16 | Stop reason: SDUPTHER

## 2021-03-16 RX ORDER — METOLAZONE 2.5 MG/1
2.5 TABLET ORAL EVERY OTHER DAY
COMMUNITY
End: 2021-05-20 | Stop reason: SDUPTHER

## 2021-03-16 RX ORDER — SPIRONOLACTONE 25 MG/1
25 TABLET ORAL DAILY
Qty: 30 TABLET | Refills: 11 | Status: SHIPPED | OUTPATIENT
Start: 2021-03-16 | End: 2021-03-16 | Stop reason: SDUPTHER

## 2021-03-16 NOTE — PROGRESS NOTES
"Chief Complaint   Patient presents with     Well Child     4 mo [facility]       Initial Pulse 124  Temp 98.4  F (36.9  C) (Temporal)  Ht 2' 1.2\" (0.64 m)  Wt 16 lb 14 oz (7.654 kg)  HC 16.93\" (43 cm)  BMI 18.69 kg/m2 Estimated body mass index is 18.69 kg/(m^2) as calculated from the following:    Height as of this encounter: 2' 1.2\" (0.64 m).    Weight as of this encounter: 16 lb 14 oz (7.654 kg).  Medication Reconciliation: complete   Carol Talley, FABRIZIO     " Luis Thompson  1940  PCP: Tram Mcgee MD    SUBJECTIVE:   Luis Thompson is a 80 y.o. male seen for a consultation visit regarding the following:     Chief Complaint: Atrial Fibrillation and congestive heart failure      Consultation is requested by Tram Mcgee MD for evaluation of No chief complaint on file.        History:  This is a 80-year-old patient referred by Tram Mcgee for further management of congestive heart failure, atrial fibrillation, and mitral valve disease.  Patient has a complex medical history including coronary artery disease along with a mechanical mitral valve replacement.  He has an EF of 10 to 15% along with a biventricular ICD.  His atrial fibrillation is classified as permanent in nature.  His biggest issue he has recurrent pleural effusions that required draining at least once a week.  Also has severe pulmonary hypertension.  He has class III heart failure symptoms on a daily basis.      Cardiac PMH: (Old records have been reviewed and summarized below)  1.  Chronic systolic heart failure-EF 10 to 15%  2.  Much regurgitation-status post mechanical mitral valve replacement  3.  History of a left atrial thrombus  4.  Coronary artery disease status coronary arteries bypass grafting  5.  Biventricular ICD-[employer]  6.  Atrial fibrillation-permanent nature  7.  Recurrent pleural effusions with Pleurx catheter in the right side  8.  Chronic kidney disease  9.  Severe pulmonary hypertension    Past Medical History, Past Surgical History, Family history, Social History, and Medications were all reviewed with the patient today and updated as necessary.       Current Outpatient Medications:   •  allopurinol (ZYLOPRIM) 100 MG tablet, Take 100 mg by mouth Daily., Disp: , Rfl:   •  carvedilol (COREG) 25 MG tablet, Take  by mouth Daily., Disp: , Rfl:   •  losartan (COZAAR) 25 MG tablet, Take 25 mg by mouth Daily., Disp: , Rfl:   •  metOLazone (ZAROXOLYN) 2.5 MG tablet, Take  "2.5 mg by mouth Every Other Day. Every Mon, wed, fri, Disp: , Rfl:   •  warfarin (COUMADIN) 5 MG tablet, Take 5 mg by mouth Daily. Mon,Tues,Wed 5mg, Thursday-Sunday 4 mg, Disp: , Rfl:   •  bumetanide (BUMEX) 1 MG tablet, Take 1 tablet by mouth 2 (Two) Times a Day., Disp: 60 tablet, Rfl: 3  •  carvedilol (COREG) 25 MG tablet, Take 25 mg by mouth Daily., Disp: , Rfl:   •  eplerenone (INSPRA) 25 MG tablet, Take 1 tablet by mouth Daily., Disp: 30 tablet, Rfl: 1  •  potassium chloride (K-DUR,KLOR-CON) 10 MEQ CR tablet, Take 3 tablets by mouth 2 (Two) Times a Day., Disp: , Rfl:     Allergies   Allergen Reactions   • Iodinated Diagnostic Agents Hives   • Iodine Hives         Past Medical History:   Diagnosis Date   • Atrial fibrillation (CMS/HCC)    • CHF (congestive heart failure) (CMS/HCC)    • Coronary artery disease involving coronary bypass graft of native heart without angina pectoris    • Heart disease    • Hypertension      Past Surgical History:   Procedure Laterality Date   • ANKLE SURGERY     • CARDIAC PACEMAKER PLACEMENT     • CATARACT EXTRACTION     • COLONOSCOPY N/A 9/8/2017    Procedure: COLONOSCOPY;  Surgeon: Magan Hu MD;  Location: [facility];  Service:    • CORONARY ARTERY BYPASS GRAFT     • ENDOSCOPY N/A 9/8/2017    Procedure: ESOPHAGOGASTRODUODENOSCOPY;  Surgeon: Magan Hu MD;  Location: [facility];  Service:    • HERNIA REPAIR     • HYDROCELE EXCISION / REPAIR     • KNEE SURGERY       Family History   Problem Relation Age of Onset   • Stroke Mother    • Emphysema Father    • Other Brother         heart problems   • Stroke Half-Sister      Social History     Tobacco Use   • Smoking status: Never Smoker   • Smokeless tobacco: Never Used   Substance Use Topics   • Alcohol use: No            PHYSICAL EXAM:   /60 (BP Location: Left arm, Patient Position: Sitting)   Pulse 71   Ht 177.8 cm (70\")   Wt 88.5 kg (195 lb)   BMI 27.98 kg/m²      Wt Readings from Last 5 Encounters:   03/23/21 " 88.5 kg (195 lb)   03/23/21 92.6 kg (204 lb 4 oz)   03/16/21 88.5 kg (195 lb)   06/10/20 88.9 kg (196 lb)   11/09/17 88.5 kg (195 lb)     BP Readings from Last 5 Encounters:   03/23/21 117/64   03/16/21 122/60   06/10/20 116/99   11/09/17 145/84   11/06/17 139/72       General-Well Nourished, Well developed  Eyes - PERRLA  Neck- supple, No mass, severe JVD  CV- regular rate and rhythm, mechanical valve sounds  Lung-decreased breath sounds bilaterally  Abd- soft, +BS  Musc/skel - Norm strength and range of motion  Skin- warm and dry  Neuro - Alert & Oriented x 3, appropriate mood.    Medical problems and test results were reviewed with the patient today.     Results for orders placed or performed in visit on 01/18/21   Eosinophil Smear - Urine, Urine, Clean Catch    Specimen: Urine, Clean Catch   Result Value Ref Range    Eosinophil Smear 0 0 - 0 % EOS/100 Cells   Urinalysis With Microscopic If Indicated (No Culture) - Urine, Clean Catch    Specimen: Urine, Clean Catch   Result Value Ref Range    Color, UA Dark Yellow (A) Yellow, Straw    Appearance, UA Cloudy (A) Clear    pH, UA 5.5 5.0 - 8.0    Specific Gravity, UA 1.021 1.005 - 1.030    Glucose, UA Negative Negative    Ketones, UA Negative Negative    Bilirubin, UA Moderate (2+) (A) Negative    Blood, UA Negative Negative    Protein, UA Trace (A) Negative    Leuk Esterase, UA Negative Negative    Nitrite, UA Negative Negative    Urobilinogen, UA 1.0 E.U./dL 0.2 - 1.0 E.U./dL   Uric Acid    Specimen: Blood   Result Value Ref Range    Uric Acid 11.4 (H) 3.4 - 7.0 mg/dL   Protein / Creatinine Ratio, Urine - Urine, Clean Catch    Specimen: Urine, Clean Catch   Result Value Ref Range    Protein/Creatinine Ratio, Urine 132.2 0.0 - 200.0 mg/G Crea    Creatinine, Urine 166.4 mg/dL    Total Protein, Urine 22.0 mg/dL   Comprehensive Metabolic Panel    Specimen: Blood   Result Value Ref Range    Glucose 110 (H) 65 - 99 mg/dL    BUN 28 (H) 8 - 23 mg/dL    Creatinine 1.25 0.76  - 1.27 mg/dL    Sodium 139 136 - 145 mmol/L    Potassium 3.2 (L) 3.5 - 5.2 mmol/L    Chloride 98 98 - 107 mmol/L    CO2 31.1 (H) 22.0 - 29.0 mmol/L    Calcium 8.5 (L) 8.6 - 10.5 mg/dL    Total Protein 6.6 6.0 - 8.5 g/dL    Albumin 4.00 3.50 - 5.20 g/dL    ALT (SGPT) 36 1 - 41 U/L    AST (SGOT) 55 (H) 1 - 40 U/L    Alkaline Phosphatase 123 (H) 39 - 117 U/L    Total Bilirubin 4.1 (H) 0.0 - 1.2 mg/dL    eGFR Non African Amer 56 (L) >60 mL/min/1.73    Globulin 2.6 gm/dL    A/G Ratio 1.5 g/dL    BUN/Creatinine Ratio 22.4 7.0 - 25.0    Anion Gap 9.9 5.0 - 15.0 mmol/L         No results found for: CHOL, HDL, HDLC, LDL, LDLC, VLDL    EKG:  (EKG/Tracing has been independently visualized by me and summarized below)      ECG 12 Lead    Date/Time: 3/18/2021 12:35 PM  Performed by: Lawrence Alegria MD  Authorized by: Lawrence Alegria MD   Comparison: compared with previous ECG   Similar to previous ECG  Rhythm: atrial fibrillation and paced  Rate: normal  QRS axis: normal  Pacing: ventricular paced rhythm  Clinical impression: abnormal EKG            ASSESSMENT and PLAN  1.  Chronic systolic heart failure-EF of 10 to 15%-recurrent pleural effusions and heart failure admissions.  We will attempt to adjust his diuretic regiment by starting Aldactone 50 mg each day and changing his Lasix over to Bumex due to his apparent hepatic and intestinal edema.  We will need to check baseline labs.  Also recheck an echocardiogram.  We will have the patient referred to the congestive heart failure clinic to assist.  Most likely will need weekly visits for measuring of electrolytes and volume status  2.  Biventricular ICD-attempt to optimize by adjusting VDD timing  3.  Atrial fibrillation-permanent in nature  4.  Recurrent pleural effusion-currently has a Pleurx drain in place-hopeful with addition of Aldactone and changing to Bumex that we can help with his congestion  5. Severe pulmonary hypertension-remeasured by echo  6.  Chronic  kidney disease-monitor renal function with adjustment of diuretics    Return in about 4 weeks (around 4/13/2021).    1.  Echocardiogram  2.  Baseline labs-CMP, magnesium        Lawrence Alegria M.D., F.A.C.C, F.H.R.S.  Cardiology/Electrophysiology  03/30/21  09:07 EDT

## 2021-03-18 PROCEDURE — 93000 ELECTROCARDIOGRAM COMPLETE: CPT | Performed by: INTERNAL MEDICINE

## 2021-03-23 ENCOUNTER — HOSPITAL ENCOUNTER (OUTPATIENT)
Dept: CARDIOLOGY | Facility: HOSPITAL | Age: 81
Discharge: HOME | End: 2021-03-23
Admitting: INTERNAL MEDICINE

## 2021-03-23 ENCOUNTER — OFFICE VISIT (OUTPATIENT)
Dept: CARDIOLOGY | Facility: HOSPITAL | Age: 81
End: 2021-03-23

## 2021-03-23 VITALS
SYSTOLIC BLOOD PRESSURE: 117 MMHG | BODY MASS INDEX: 29.24 KG/M2 | HEIGHT: 70 IN | HEART RATE: 75 BPM | WEIGHT: 204.25 LBS | RESPIRATION RATE: 18 BRPM | TEMPERATURE: 97.8 F | DIASTOLIC BLOOD PRESSURE: 64 MMHG | OXYGEN SATURATION: 100 %

## 2021-03-23 VITALS — HEIGHT: 70 IN | BODY MASS INDEX: 27.92 KG/M2 | WEIGHT: 195 LBS

## 2021-03-23 DIAGNOSIS — J90 PLEURAL EFFUSION: ICD-10-CM

## 2021-03-23 DIAGNOSIS — I50.22 CHRONIC SYSTOLIC CONGESTIVE HEART FAILURE (HCC): ICD-10-CM

## 2021-03-23 DIAGNOSIS — I27.20 PULMONARY HYPERTENSION (HCC): ICD-10-CM

## 2021-03-23 DIAGNOSIS — I48.21 PERMANENT ATRIAL FIBRILLATION (HCC): ICD-10-CM

## 2021-03-23 DIAGNOSIS — I50.42 CHRONIC COMBINED SYSTOLIC AND DIASTOLIC HEART FAILURE (HCC): Primary | ICD-10-CM

## 2021-03-23 PROCEDURE — 99214 OFFICE O/P EST MOD 30 MIN: CPT | Performed by: NURSE PRACTITIONER

## 2021-03-23 PROCEDURE — 93306 TTE W/DOPPLER COMPLETE: CPT

## 2021-03-23 PROCEDURE — 93306 TTE W/DOPPLER COMPLETE: CPT | Performed by: INTERNAL MEDICINE

## 2021-03-23 RX ORDER — POTASSIUM CHLORIDE 750 MG/1
30 TABLET, EXTENDED RELEASE ORAL 2 TIMES DAILY
Start: 2021-03-23 | End: 2021-05-20

## 2021-03-23 NOTE — PROGRESS NOTES
[facility], [facility]    Chief Complaint  Congestive Heart Failure    Subjective    History of Present Illness {  Problem List  Visit  Diagnosis   Encounters  Notes  Medications  Labs  Result Review Imaging  Media :23}     Luis Thompson presents to [facility] CARDIOLOGY for   History of Present Illness     80-year-old male with chronic systolic heart failure, EF 10 to 15% with biventricular ICD, mitral regurgitation status post mechanical mitral valve replacement, CAD status post coronary artery bypass grafting, permanent A. fib, recurrent pleural effusions with Pleurx catheter, chronic kidney disease, severe pulmonary hypertension.    Patient currently on carvedilol, losartan, metolazone 2.5 mg every other day, Bumex, spironolactone.    Last week spironolactone was increased to 50 mg daily (Patient did not change)  Lasix was discontinued starting Bumex (patient did make med change).  Echocardiogram scheduled for today, results pending.    Patient currently not taking metolazone.    Patient had not noticed any change in urine output after starting Bumex and discontinuing Lasix.  Patient tells me he has been had a Pleurx catheter for 1 to 2 months.  Over the last several weeks he has had to empty the catheter 3 times a week instead of 2 times a week.  Each time emptying at least a liter to a liter and a half.  Continues to have lower extremity edema.  Is not wearing compression stockings.  Decreased activity tolerance, dyspnea with exertion, intermittent PND orthopnea.  Improved after emptying Pleurx catheter.    Objective     Vital Signs:   Vitals:    03/23/21 1505 03/23/21 1506 03/23/21 1507   BP: 108/54 110/59 117/64   BP Location: Right arm Left arm Left arm   Patient Position: Sitting Sitting Standing   Cuff Size: Adult Adult Adult   Pulse: 75 76 75   Resp:   18   Temp:   97.8 °F (36.6 °C)   TempSrc:   Temporal   SpO2: 100% 100% 100%  "  Weight:   92.6 kg (204 lb 4 oz)   Height:   177.8 cm (70\")     Body mass index is 29.31 kg/m².  Physical Exam  Vitals reviewed.   Constitutional:       General: He is not in acute distress.     Appearance: He is ill-appearing.   HENT:      Mouth/Throat:      Mouth: Mucous membranes are moist.   Eyes:      General: No scleral icterus.     Conjunctiva/sclera: Conjunctivae normal.   Neck:      Vascular: No carotid bruit.   Cardiovascular:      Rate and Rhythm: Normal rate. Rhythm irregular.      Pulses:           Radial pulses are 2+ on the right side.        Dorsalis pedis pulses are 2+ on the right side.        Posterior tibial pulses are 2+ on the right side.      Heart sounds: Normal heart sounds.   Pulmonary:      Effort: Pulmonary effort is normal.      Breath sounds: Examination of the right-upper field reveals decreased breath sounds. Examination of the right-middle field reveals decreased breath sounds and rales. Examination of the right-lower field reveals decreased breath sounds and rales. Decreased breath sounds and rales present.   Abdominal:      General: There is no distension.      Palpations: Abdomen is soft.      Tenderness: There is no abdominal tenderness.   Musculoskeletal:      Right lower le+ Edema present.      Left lower le+ Edema present.   Skin:     General: Skin is warm and dry.      Coloration: Skin is not jaundiced.   Neurological:      Mental Status: He is alert and oriented to person, place, and time.   Psychiatric:         Attention and Perception: Attention normal.         Mood and Affect: Mood and affect normal.         Speech: Speech normal.         Behavior: Behavior normal. Behavior is cooperative.         Thought Content: Thought content normal.              Result Review  Data Reviewed:{ Labs  Result Review  Imaging  Med Tab  Media :23}     Pt has labs completed yesterday with PCP. Request for review.     Assessment and Plan {CC Problem List  Visit Diagnosis  ROS "  Review (Popup)  Health Maintenance  Quality  BestPractice  Medications  SmartSets  SnapShot Encounters  Media :23}   1. Chronic combined systolic and diastolic heart failure (CMS/HCC)  Bumex 1 mg daily  Increase aldactone 50 mg daily    Request labs from PCP for review    Echo results pending    Discuss s/s HF worsening and role of H&V Center.    2. Permanent atrial fibrillation (CMS/HCC)  HR controlled    3. Pulmonary hypertension (CMS/HCC)  BP controlled    4. Pleural effusion  With Pleurx catheter on right.        F/u 2 weeks telehvisit    Follow Up {Instructions Charge Capture  Follow-up Communications :23}   Return in about 2 weeks (around 4/6/2021) for Telephone visit, HF.    Patient was given instructions and counseling regarding his condition or for health maintenance advice. Please see specific information pulled into the AVS if appropriate.  Patient was instructed to call the Heart and Valve Center with any questions, concerns, or worsening symptoms.    *Please note that portions of this note were completed with a voice recognition program. Efforts were made to edit the dictations, but occasionally words are mistranscribed.

## 2021-03-24 ENCOUNTER — TELEPHONE (OUTPATIENT)
Dept: CARDIOLOGY | Facility: HOSPITAL | Age: 81
End: 2021-03-24

## 2021-03-24 DIAGNOSIS — J90 PLEURAL EFFUSION: ICD-10-CM

## 2021-03-24 DIAGNOSIS — I50.42 CHRONIC COMBINED SYSTOLIC AND DIASTOLIC HEART FAILURE (HCC): Primary | ICD-10-CM

## 2021-03-24 RX ORDER — SPIRONOLACTONE 50 MG/1
50 TABLET, FILM COATED ORAL DAILY
Qty: 30 TABLET | Refills: 3 | Status: SHIPPED | OUTPATIENT
Start: 2021-03-24 | End: 2021-03-26

## 2021-03-24 RX ORDER — BUMETANIDE 1 MG/1
1 TABLET ORAL 2 TIMES DAILY
Qty: 60 TABLET | Refills: 3 | Status: SHIPPED | OUTPATIENT
Start: 2021-03-24 | End: 2022-05-06 | Stop reason: SDUPTHER

## 2021-03-24 NOTE — TELEPHONE ENCOUNTER
Call to follow-up with patient regarding labs completed by primary care provider on 3/22/2021    Creatinine 1.12, estimated GFR 63, BUN 23, sodium 139, potassium 4.4, chloride 102, carbon dioxide 3    Echocardiogram prelim from 3/23/2021:  EF 33% (up from 10%), LA severely dilated, RV severely dilated, RA severely dilated, moderate MR, severe TR with RVSP 45 to 55 mmHg  Final read pending      Patient emptied 1100 mL from Pleurx catheter today    Patient will increase Bumex to 1 mg twice a day.  Continue spironolactone 50 mg daily    BMP in 1 week.  Lab order to be mailed to patient.    Would like to consider Entresto but blood pressure is marginal.  Monitor closely at home    Schedule II week telehealth visit in the heart valve center.

## 2021-03-25 LAB
BH CV ECHO MEAS - AO MAX PG (FULL): 7.3 MMHG
BH CV ECHO MEAS - AO MAX PG: 8.4 MMHG
BH CV ECHO MEAS - AO MEAN PG (FULL): 4.1 MMHG
BH CV ECHO MEAS - AO MEAN PG: 4.6 MMHG
BH CV ECHO MEAS - AO ROOT AREA (BSA CORRECTED): 1.7
BH CV ECHO MEAS - AO ROOT AREA: 9.4 CM^2
BH CV ECHO MEAS - AO ROOT DIAM: 3.5 CM
BH CV ECHO MEAS - AO V2 MAX: 144.8 CM/SEC
BH CV ECHO MEAS - AO V2 MEAN: 99.9 CM/SEC
BH CV ECHO MEAS - AO V2 VTI: 33.4 CM
BH CV ECHO MEAS - ASC AORTA: 3.7 CM
BH CV ECHO MEAS - AVA(I,A): 1.2 CM^2
BH CV ECHO MEAS - AVA(I,D): 1.2 CM^2
BH CV ECHO MEAS - AVA(V,A): 1.2 CM^2
BH CV ECHO MEAS - AVA(V,D): 1.2 CM^2
BH CV ECHO MEAS - BSA(HAYCOCK): 2.1 M^2
BH CV ECHO MEAS - BSA: 2.1 M^2
BH CV ECHO MEAS - BZI_BMI: 28 KILOGRAMS/M^2
BH CV ECHO MEAS - BZI_METRIC_HEIGHT: 177.8 CM
BH CV ECHO MEAS - BZI_METRIC_WEIGHT: 88.5 KG
BH CV ECHO MEAS - EDV(CUBED): 212.1 ML
BH CV ECHO MEAS - EDV(MOD-SP2): 218 ML
BH CV ECHO MEAS - EDV(MOD-SP4): 203 ML
BH CV ECHO MEAS - EDV(TEICH): 177.5 ML
BH CV ECHO MEAS - EF(CUBED): 32.7 %
BH CV ECHO MEAS - EF(MOD-SP4): 30 %
BH CV ECHO MEAS - EF(TEICH): 26.2 %
BH CV ECHO MEAS - ESV(CUBED): 142.8 ML
BH CV ECHO MEAS - ESV(MOD-SP2): 125 ML
BH CV ECHO MEAS - ESV(MOD-SP4): 142 ML
BH CV ECHO MEAS - ESV(TEICH): 131.1 ML
BH CV ECHO MEAS - FS: 12.4 %
BH CV ECHO MEAS - IVS/LVPW: 0.64
BH CV ECHO MEAS - IVSD: 0.83 CM
BH CV ECHO MEAS - LA DIMENSION: 7.1 CM
BH CV ECHO MEAS - LA/AO: 2.1
BH CV ECHO MEAS - LAD MAJOR: 8.2 CM
BH CV ECHO MEAS - LAT PEAK E' VEL: 6.7 CM/SEC
BH CV ECHO MEAS - LATERAL E/E' RATIO: 25.6
BH CV ECHO MEAS - LV DIASTOLIC VOL/BSA (35-75): 98.3 ML/M^2
BH CV ECHO MEAS - LV MASS(C)D: 262.9 GRAMS
BH CV ECHO MEAS - LV MASS(C)DI: 127.3 GRAMS/M^2
BH CV ECHO MEAS - LV MAX PG: 1.1 MMHG
BH CV ECHO MEAS - LV MEAN PG: 0.54 MMHG
BH CV ECHO MEAS - LV SYSTOLIC VOL/BSA (12-30): 68.8 ML/M^2
BH CV ECHO MEAS - LV V1 MAX: 52.1 CM/SEC
BH CV ECHO MEAS - LV V1 MEAN: 34.4 CM/SEC
BH CV ECHO MEAS - LV V1 VTI: 11.4 CM
BH CV ECHO MEAS - LVIDD: 6 CM
BH CV ECHO MEAS - LVIDS: 5.2 CM
BH CV ECHO MEAS - LVLD AP2: 9 CM
BH CV ECHO MEAS - LVLD AP4: 9.2 CM
BH CV ECHO MEAS - LVLS AP2: 8.3 CM
BH CV ECHO MEAS - LVLS AP4: 9.3 CM
BH CV ECHO MEAS - LVOT AREA (M): 3.5 CM^2
BH CV ECHO MEAS - LVOT AREA: 3.4 CM^2
BH CV ECHO MEAS - LVOT DIAM: 2.1 CM
BH CV ECHO MEAS - LVPWD: 1.3 CM
BH CV ECHO MEAS - MED PEAK E' VEL: 6.9 CM/SEC
BH CV ECHO MEAS - MEDIAL E/E' RATIO: 24.8
BH CV ECHO MEAS - MPA AREA: 8.7 CM^2
BH CV ECHO MEAS - MPA DIAM: 3.3 CM
BH CV ECHO MEAS - MR MAX PG: 129 MMHG
BH CV ECHO MEAS - MR MAX VEL: 568.6 CM/SEC
BH CV ECHO MEAS - MR MEAN PG: 90 MMHG
BH CV ECHO MEAS - MR MEAN VEL: 451 CM/SEC
BH CV ECHO MEAS - MR VTI: 193 CM
BH CV ECHO MEAS - MV DEC TIME: 0.29 SEC
BH CV ECHO MEAS - MV E MAX VEL: 174.1 CM/SEC
BH CV ECHO MEAS - MV MAX PG: 17 MMHG
BH CV ECHO MEAS - MV MEAN PG: 6.1 MMHG
BH CV ECHO MEAS - MV V2 MAX: 206.3 CM/SEC
BH CV ECHO MEAS - MV V2 MEAN: 117.1 CM/SEC
BH CV ECHO MEAS - MV V2 VTI: 50.1 CM
BH CV ECHO MEAS - MVA(VTI): 0.77 CM^2
BH CV ECHO MEAS - PA ACC SLOPE: 902 CM/SEC^2
BH CV ECHO MEAS - PA ACC TIME: 0.08 SEC
BH CV ECHO MEAS - PA MAX PG: 2.9 MMHG
BH CV ECHO MEAS - PA PR(ACCEL): 42.6 MMHG
BH CV ECHO MEAS - PA V2 MAX: 85.5 CM/SEC
BH CV ECHO MEAS - PAPD(PI EDV): 5 MMHG
BH CV ECHO MEAS - PI END-D VEL: 114 CM/SEC
BH CV ECHO MEAS - QP/QS (V,AO): 0.54
BH CV ECHO MEAS - QP/QS (V,LVOT): 4.2
BH CV ECHO MEAS - RAP SYSTOLE: 15 MMHG
BH CV ECHO MEAS - RVSP: 45 MMHG
BH CV ECHO MEAS - SI(AO): 152.4 ML/M^2
BH CV ECHO MEAS - SI(CUBED): 33.6 ML/M^2
BH CV ECHO MEAS - SI(LVOT): 18.8 ML/M^2
BH CV ECHO MEAS - SI(MOD-SP2): 45 ML/M^2
BH CV ECHO MEAS - SI(MOD-SP4): 29.5 ML/M^2
BH CV ECHO MEAS - SI(TEICH): 22.5 ML/M^2
BH CV ECHO MEAS - SV(AO): 314.7 ML
BH CV ECHO MEAS - SV(CUBED): 69.4 ML
BH CV ECHO MEAS - SV(LVOT): 38.8 ML
BH CV ECHO MEAS - SV(MOD-SP2): 93 ML
BH CV ECHO MEAS - SV(MOD-SP4): 61 ML
BH CV ECHO MEAS - SV(TEICH): 46.5 ML
BH CV ECHO MEAS - TAPSE (>1.6): 2.1 CM
BH CV ECHO MEAS - TR MAX PG: 30 MMHG
BH CV ECHO MEAS - TR MAX VEL: 276 CM/SEC
BH CV ECHO MEASUREMENTS AVERAGE E/E' RATIO: 25.6
BH CV VAS BP LEFT ARM: NORMAL MMHG
BH CV XLRA - RV BASE: 7.3 CM
BH CV XLRA - RV LENGTH: 9.9 CM
BH CV XLRA - RV MID: 6.1 CM
BH CV XLRA - TDI S': 7.02 CM/SEC
LEFT ATRIUM VOLUME INDEX: 109.4 ML/M^2
LEFT ATRIUM VOLUME: 226 ML

## 2021-03-25 NOTE — TELEPHONE ENCOUNTER
Called patient to discuss lab results. Patient answered the phone but the call disconnected in the middle of the call. Attempted to call patient back, no voicemail or answer.

## 2021-03-26 ENCOUNTER — TELEPHONE (OUTPATIENT)
Dept: CARDIOLOGY | Facility: HOSPITAL | Age: 81
End: 2021-03-26

## 2021-03-26 DIAGNOSIS — I50.42 CHRONIC COMBINED SYSTOLIC AND DIASTOLIC HEART FAILURE (HCC): Primary | ICD-10-CM

## 2021-03-26 RX ORDER — EPLERENONE 25 MG/1
25 TABLET, FILM COATED ORAL DAILY
Qty: 30 TABLET | Refills: 1 | Status: SHIPPED | OUTPATIENT
Start: 2021-03-26 | End: 2021-08-25

## 2021-03-26 NOTE — TELEPHONE ENCOUNTER
Discussed with patient we with his HF we are trying to adjust his meds to keep the fluid off.  The swelling may be more related to the HF instead of the aldactone.  Pt is not willing to continue.    Will change to eplerenone 25 mg daily and then adjust as tolerated.

## 2021-03-26 NOTE — TELEPHONE ENCOUNTER
Patient's wife called and states that patient to take his Spironlactone because he believes that it is causing more swelling in his feet and wants to know if there is another medication that he could take instead.    Spoke to Marcelino, she will change to Inspira. Patient's wife notified.

## 2021-03-28 ENCOUNTER — HOSPITAL ENCOUNTER (EMERGENCY)
Dept: HOSPITAL 79 - ER1 | Age: 81
Discharge: HOME | End: 2021-03-28
Payer: MEDICARE

## 2021-03-28 DIAGNOSIS — I50.9: ICD-10-CM

## 2021-03-28 DIAGNOSIS — J90: ICD-10-CM

## 2021-03-28 DIAGNOSIS — I11.0: ICD-10-CM

## 2021-03-28 DIAGNOSIS — L03.313: Primary | ICD-10-CM

## 2021-03-28 DIAGNOSIS — I48.0: ICD-10-CM

## 2021-03-28 LAB
BUN/CREATININE RATIO: 19 (ref 0–10)
HGB BLD-MCNC: 12.5 GM/DL (ref 14–17.5)
RED BLOOD COUNT: 4.45 M/UL (ref 4.2–5.5)
WHITE BLOOD COUNT: 5.9 K/UL (ref 4.5–11)

## 2021-04-02 ENCOUNTER — LAB (OUTPATIENT)
Dept: LAB | Facility: HOSPITAL | Age: 81
End: 2021-04-02

## 2021-04-02 DIAGNOSIS — I50.42 CHRONIC COMBINED SYSTOLIC AND DIASTOLIC HEART FAILURE (HCC): ICD-10-CM

## 2021-04-02 DIAGNOSIS — J90 PLEURAL EFFUSION: ICD-10-CM

## 2021-04-02 LAB
ANION GAP SERPL CALCULATED.3IONS-SCNC: 7.6 MMOL/L (ref 5–15)
BUN SERPL-MCNC: 23 MG/DL (ref 8–23)
BUN/CREAT SERPL: 17.2 (ref 7–25)
CALCIUM SPEC-SCNC: 8.6 MG/DL (ref 8.6–10.5)
CHLORIDE SERPL-SCNC: 103 MMOL/L (ref 98–107)
CO2 SERPL-SCNC: 28.4 MMOL/L (ref 22–29)
CREAT SERPL-MCNC: 1.34 MG/DL (ref 0.76–1.27)
GFR SERPL CREATININE-BSD FRML MDRD: 51 ML/MIN/1.73
GLUCOSE SERPL-MCNC: 93 MG/DL (ref 65–99)
POTASSIUM SERPL-SCNC: 4.7 MMOL/L (ref 3.5–5.2)
SODIUM SERPL-SCNC: 139 MMOL/L (ref 136–145)

## 2021-04-02 PROCEDURE — 36415 COLL VENOUS BLD VENIPUNCTURE: CPT

## 2021-04-02 PROCEDURE — 80048 BASIC METABOLIC PNL TOTAL CA: CPT

## 2021-04-07 ENCOUNTER — TELEPHONE (OUTPATIENT)
Dept: CARDIOLOGY | Facility: HOSPITAL | Age: 81
End: 2021-04-07

## 2021-04-07 ENCOUNTER — OFFICE VISIT (OUTPATIENT)
Dept: CARDIOLOGY | Facility: HOSPITAL | Age: 81
End: 2021-04-07

## 2021-04-07 VITALS
WEIGHT: 195 LBS | BODY MASS INDEX: 27.92 KG/M2 | SYSTOLIC BLOOD PRESSURE: 122 MMHG | DIASTOLIC BLOOD PRESSURE: 72 MMHG | HEART RATE: 75 BPM | HEIGHT: 70 IN

## 2021-04-07 DIAGNOSIS — I48.21 PERMANENT ATRIAL FIBRILLATION (HCC): ICD-10-CM

## 2021-04-07 DIAGNOSIS — I50.22 CHRONIC SYSTOLIC CONGESTIVE HEART FAILURE (HCC): Primary | ICD-10-CM

## 2021-04-07 DIAGNOSIS — I27.20 PULMONARY HYPERTENSION (HCC): ICD-10-CM

## 2021-04-07 DIAGNOSIS — I34.0 NONRHEUMATIC MITRAL VALVE REGURGITATION: ICD-10-CM

## 2021-04-07 DIAGNOSIS — J90 PLEURAL EFFUSION: ICD-10-CM

## 2021-04-07 PROCEDURE — 99442 PR PHYS/QHP TELEPHONE EVALUATION 11-20 MIN: CPT | Performed by: NURSE PRACTITIONER

## 2021-04-07 NOTE — PROGRESS NOTES
[facility], [facility] [place] [facility]    This was an audio enabled telemedicine encounter.    You have chosen to receive care through the use of telemedicine. Telemedicine enables health care providers at different locations to provide safe, effective, and convenient care through the use of technology. As with any health care service, there are risks associated with the use of telemedicine, including equipment failure, poor connections, and  issues.    • Do you understand the risks and benefits of telemedicine as I have explained them to you? Yes  • Have your questions regarding telemedicine been answered? Yes  • Do you consent to the use of telemedicine in your medical care today? Yes    Chief Complaint  Follow-up and Congestive Heart Failure    Subjective    History of Present Illness {CC  Problem List  Visit  Diagnosis   Encounters  Notes  Medications  Labs  Result Review Imaging  Media :23}     Luis Thompson presents to [facility] CARDIOLOGY for   History of Present Illness     80-year-old male with chronic systolic heart failure with BiV ICD, mitral regurgitation status post mechanical mitral valve replacement, CAD status post coronary artery bypass grafting, permanent A. fib, recurrent pleural effusions with Pleurx catheter, chronic kidney disease, severe pulmonary hypertension.    Patient currently on carvedilol, losartan, Bumex, eplerenone.  Patient felt that spironolactone worsened his edema.  He will take metolazone 2.5 mg every other day if needed.    Echocardiogram 3/25/2021 with EF 26 to 30%, severe TR with RVSP 45 to 55 mmHg, moderate MR.  EF improved from 10 to 15%.    No CP, pressure.  Edema improved (worse left foot).  Draining less from Pleurx 800  (3  Times per week), compared to 1200 ml (3 times per week).  No worsening dyspnea.  Pt c/o of leg cramps and gout.       Objective     Vital Signs:   Vitals:    04/07/21  "1238   BP: 122/72   BP Location: Left arm   Patient Position: Sitting   Pulse: 75   Weight: 88.5 kg (195 lb)   Height: 177.8 cm (70\")     Body mass index is 27.98 kg/m².  Physical Exam     A/o.   Good historian.  Non labored interview. Reports no abdominal fullness or tenderness.  reportsEdema improved (but still worse left foot then right).    Result Review  Data Reviewed:{ Labs  Result Review  Imaging  Med Tab  Media :23}     Lab Results   Component Value Date    GLUCOSE 93 04/02/2021    CALCIUM 8.6 04/02/2021     04/02/2021    K 4.7 04/02/2021    CO2 28.4 04/02/2021     04/02/2021    BUN 23 04/02/2021    CREATININE 1.34 (H) 04/02/2021    EGFRIFNONA 51 (L) 04/02/2021    BCR 17.2 04/02/2021    ANIONGAP 7.6 04/02/2021       Assessment and Plan {CC Problem List  Visit Diagnosis  ROS  Review (Popup)  Health Maintenance  Quality  BestPractice  Medications  SmartSets  SnapShot Encounters  Media :23}   1. Chronic systolic congestive heart failure (CMS/HCC)  EF 26-30%  BIV ICD  Coreg  Not taking losartan due to hypotension    Continue:  Bumex 1 mg BID  Eplerenone    Metolazone 2.5 mg Monday Wednesday Friday    Continue potassium supplement    Repeat BMP and magnesium level in 1 week  2. Permanent atrial fibrillation (CMS/HCC)  Heart rate controlled  Coumadin followed by primary care provider    3. Pulmonary hypertension (CMS/HCC)  Blood pressure controlled    4. Pleural effusion  Pleurx catheter in place  Has been draining 1200 mL 3 times a week now down to 800 mL 3 times a week      5. Nonrheumatic mitral valve regurgitation  Status post mechanical valve on Coumadin  Moderate MR per echocardiogram    Follow-up in 3 weeks with [facility]    6-week telehealth follow-up in the heart valve center or sooner if needed      I spent 20 minutes caring for Luis on this date of service. This time includes time spent by me in the following activities:preparing for the visit, reviewing tests, performing a " medically appropriate examination and/or evaluation  and counseling and educating the patient/family/caregiver    Follow Up {Instructions Charge Capture  Follow-up Communications :23}   Return in about 6 weeks (around 5/19/2021) for Telephone visit, HF.    Patient was given instructions and counseling regarding his condition or for health maintenance advice. Please see specific information pulled into the AVS if appropriate.  Patient was instructed to call the Heart and Valve Center with any questions, concerns, or worsening symptoms.    *Please note that portions of this note were completed with a voice recognition program. Efforts were made to edit the dictations, but occasionally words are mistranscribed.

## 2021-04-09 ENCOUNTER — HOSPITAL ENCOUNTER (OUTPATIENT)
Dept: GENERAL RADIOLOGY | Facility: HOSPITAL | Age: 81
Discharge: HOME | End: 2021-04-09

## 2021-04-09 ENCOUNTER — TRANSCRIBE ORDERS (OUTPATIENT)
Dept: OTHER | Facility: OTHER | Age: 81
End: 2021-04-09

## 2021-04-09 ENCOUNTER — TRANSCRIBE ORDERS (OUTPATIENT)
Dept: ADMINISTRATIVE | Facility: HOSPITAL | Age: 81
End: 2021-04-09

## 2021-04-09 ENCOUNTER — LAB (OUTPATIENT)
Dept: LAB | Facility: HOSPITAL | Age: 81
End: 2021-04-09

## 2021-04-09 DIAGNOSIS — J90 RECURRENT RIGHT PLEURAL EFFUSION: Primary | ICD-10-CM

## 2021-04-09 DIAGNOSIS — D50.9 IRON DEFICIENCY ANEMIA, UNSPECIFIED IRON DEFICIENCY ANEMIA TYPE: Primary | ICD-10-CM

## 2021-04-09 DIAGNOSIS — J90 RECURRENT RIGHT PLEURAL EFFUSION: ICD-10-CM

## 2021-04-09 DIAGNOSIS — D64.9 ANEMIA, UNSPECIFIED TYPE: ICD-10-CM

## 2021-04-09 DIAGNOSIS — D50.9 IRON DEFICIENCY ANEMIA, UNSPECIFIED IRON DEFICIENCY ANEMIA TYPE: ICD-10-CM

## 2021-04-09 LAB — HEMOCCULT STL QL: NEGATIVE

## 2021-04-09 PROCEDURE — 82270 OCCULT BLOOD FECES: CPT

## 2021-04-09 PROCEDURE — 71046 X-RAY EXAM CHEST 2 VIEWS: CPT

## 2021-04-09 PROCEDURE — 71046 X-RAY EXAM CHEST 2 VIEWS: CPT | Performed by: RADIOLOGY

## 2021-04-15 ENCOUNTER — HOSPITAL ENCOUNTER (INPATIENT)
Dept: HOSPITAL 79 - PROG CARE | Age: 81
LOS: 3 days | Discharge: HOME | DRG: 920 | End: 2021-04-18
Attending: INTERNAL MEDICINE | Admitting: INTERNAL MEDICINE
Payer: MEDICARE

## 2021-04-15 VITALS — BODY MASS INDEX: 26.77 KG/M2 | HEIGHT: 70 IN | WEIGHT: 187 LBS

## 2021-04-15 DIAGNOSIS — I48.0: ICD-10-CM

## 2021-04-15 DIAGNOSIS — B95.61: ICD-10-CM

## 2021-04-15 DIAGNOSIS — Z82.3: ICD-10-CM

## 2021-04-15 DIAGNOSIS — Z79.01: ICD-10-CM

## 2021-04-15 DIAGNOSIS — N18.30: ICD-10-CM

## 2021-04-15 DIAGNOSIS — Z95.1: ICD-10-CM

## 2021-04-15 DIAGNOSIS — Z87.891: ICD-10-CM

## 2021-04-15 DIAGNOSIS — Z20.822: ICD-10-CM

## 2021-04-15 DIAGNOSIS — Z95.2: ICD-10-CM

## 2021-04-15 DIAGNOSIS — Z91.041: ICD-10-CM

## 2021-04-15 DIAGNOSIS — J90: ICD-10-CM

## 2021-04-15 DIAGNOSIS — Z82.49: ICD-10-CM

## 2021-04-15 DIAGNOSIS — Z79.899: ICD-10-CM

## 2021-04-15 DIAGNOSIS — I25.10: ICD-10-CM

## 2021-04-15 DIAGNOSIS — T85.79XA: Primary | ICD-10-CM

## 2021-04-15 DIAGNOSIS — L03.313: ICD-10-CM

## 2021-04-15 DIAGNOSIS — I25.5: ICD-10-CM

## 2021-04-15 DIAGNOSIS — I50.22: ICD-10-CM

## 2021-04-15 LAB
BUN/CREATININE RATIO: 24 (ref 0–10)
HGB BLD-MCNC: 12.3 GM/DL (ref 14–17.5)
RED BLOOD COUNT: 4.4 M/UL (ref 4.2–5.5)
WHITE BLOOD COUNT: 5.4 K/UL (ref 4.5–11)

## 2021-04-15 PROCEDURE — U0002 COVID-19 LAB TEST NON-CDC: HCPCS

## 2021-04-16 LAB
BUN/CREATININE RATIO: 26 (ref 0–10)
HGB BLD-MCNC: 11.7 GM/DL (ref 14–17.5)
RED BLOOD COUNT: 4.17 M/UL (ref 4.2–5.5)
WHITE BLOOD COUNT: 6.5 K/UL (ref 4.5–11)

## 2021-04-18 LAB
BUN/CREATININE RATIO: 19 (ref 0–10)
HGB BLD-MCNC: 11.6 GM/DL (ref 14–17.5)
RED BLOOD COUNT: 4.19 M/UL (ref 4.2–5.5)
WHITE BLOOD COUNT: 6.3 K/UL (ref 4.5–11)

## 2021-04-26 ENCOUNTER — LAB (OUTPATIENT)
Dept: LAB | Facility: HOSPITAL | Age: 81
End: 2021-04-26

## 2021-04-26 ENCOUNTER — TRANSCRIBE ORDERS (OUTPATIENT)
Dept: ADMINISTRATIVE | Facility: HOSPITAL | Age: 81
End: 2021-04-26

## 2021-04-26 DIAGNOSIS — M10.9 GOUT, ARTHRITIS: ICD-10-CM

## 2021-04-26 DIAGNOSIS — N17.9 ACUTE RENAL FAILURE, UNSPECIFIED ACUTE RENAL FAILURE TYPE (HCC): ICD-10-CM

## 2021-04-26 DIAGNOSIS — I50.22 CHRONIC SYSTOLIC CONGESTIVE HEART FAILURE (HCC): ICD-10-CM

## 2021-04-26 DIAGNOSIS — N17.9 ACUTE RENAL FAILURE, UNSPECIFIED ACUTE RENAL FAILURE TYPE (HCC): Primary | ICD-10-CM

## 2021-04-26 DIAGNOSIS — E87.6 HYPOKALEMIA: ICD-10-CM

## 2021-04-26 LAB
ALBUMIN SERPL-MCNC: 3.5 G/DL (ref 3.5–5.2)
ALBUMIN/GLOB SERPL: 1.3 G/DL
ALP SERPL-CCNC: 133 U/L (ref 39–117)
ALT SERPL W P-5'-P-CCNC: 56 U/L (ref 1–41)
ANION GAP SERPL CALCULATED.3IONS-SCNC: 11.5 MMOL/L (ref 5–15)
AST SERPL-CCNC: 79 U/L (ref 1–40)
BILIRUB SERPL-MCNC: 1.1 MG/DL (ref 0–1.2)
BILIRUB UR QL STRIP: NEGATIVE
BUN SERPL-MCNC: 26 MG/DL (ref 8–23)
BUN/CREAT SERPL: 22.6 (ref 7–25)
CALCIUM SPEC-SCNC: 8.3 MG/DL (ref 8.6–10.5)
CHLORIDE SERPL-SCNC: 101 MMOL/L (ref 98–107)
CLARITY UR: CLEAR
CO2 SERPL-SCNC: 24.5 MMOL/L (ref 22–29)
COLOR UR: YELLOW
CREAT SERPL-MCNC: 1.15 MG/DL (ref 0.76–1.27)
CREAT UR-MCNC: 30.3 MG/DL
GFR SERPL CREATININE-BSD FRML MDRD: 61 ML/MIN/1.73
GLOBULIN UR ELPH-MCNC: 2.6 GM/DL
GLUCOSE SERPL-MCNC: 144 MG/DL (ref 65–99)
GLUCOSE UR STRIP-MCNC: NEGATIVE MG/DL
HGB UR QL STRIP.AUTO: NEGATIVE
KETONES UR QL STRIP: NEGATIVE
LEUKOCYTE ESTERASE UR QL STRIP.AUTO: NEGATIVE
MAGNESIUM SERPL-MCNC: 2.1 MG/DL (ref 1.6–2.4)
NITRITE UR QL STRIP: NEGATIVE
PH UR STRIP.AUTO: 6.5 [PH] (ref 5–8)
POTASSIUM SERPL-SCNC: 4 MMOL/L (ref 3.5–5.2)
PROT SERPL-MCNC: 6.1 G/DL (ref 6–8.5)
PROT UR QL STRIP: NEGATIVE
PROT UR-MCNC: 9 MG/DL
PROT/CREAT UR: 297 MG/G CREA (ref 0–200)
SODIUM SERPL-SCNC: 137 MMOL/L (ref 136–145)
SP GR UR STRIP: 1.01 (ref 1–1.03)
URATE SERPL-MCNC: 4.8 MG/DL (ref 3.4–7)
UROBILINOGEN UR QL STRIP: NORMAL

## 2021-04-26 PROCEDURE — 82570 ASSAY OF URINE CREATININE: CPT

## 2021-04-26 PROCEDURE — 80053 COMPREHEN METABOLIC PANEL: CPT

## 2021-04-26 PROCEDURE — 81003 URINALYSIS AUTO W/O SCOPE: CPT

## 2021-04-26 PROCEDURE — 36415 COLL VENOUS BLD VENIPUNCTURE: CPT

## 2021-04-26 PROCEDURE — 83735 ASSAY OF MAGNESIUM: CPT

## 2021-04-26 PROCEDURE — 84156 ASSAY OF PROTEIN URINE: CPT

## 2021-04-26 PROCEDURE — 84550 ASSAY OF BLOOD/URIC ACID: CPT

## 2021-04-27 ENCOUNTER — OFFICE VISIT (OUTPATIENT)
Dept: CARDIOLOGY | Facility: CLINIC | Age: 81
End: 2021-04-27

## 2021-04-27 VITALS
TEMPERATURE: 98.2 F | SYSTOLIC BLOOD PRESSURE: 118 MMHG | DIASTOLIC BLOOD PRESSURE: 58 MMHG | BODY MASS INDEX: 27.49 KG/M2 | OXYGEN SATURATION: 97 % | HEIGHT: 70 IN | HEART RATE: 75 BPM | WEIGHT: 192 LBS

## 2021-04-27 DIAGNOSIS — I50.22 CHRONIC SYSTOLIC CONGESTIVE HEART FAILURE (HCC): ICD-10-CM

## 2021-04-27 DIAGNOSIS — I48.21 PERMANENT ATRIAL FIBRILLATION (HCC): Primary | ICD-10-CM

## 2021-04-27 DIAGNOSIS — I27.20 PULMONARY HYPERTENSION (HCC): ICD-10-CM

## 2021-04-27 PROCEDURE — 93284 PRGRMG EVAL IMPLANTABLE DFB: CPT | Performed by: INTERNAL MEDICINE

## 2021-04-27 PROCEDURE — 99214 OFFICE O/P EST MOD 30 MIN: CPT | Performed by: INTERNAL MEDICINE

## 2021-04-27 RX ORDER — LEVOTHYROXINE SODIUM 0.03 MG/1
25 TABLET ORAL DAILY
COMMUNITY
End: 2022-04-05

## 2021-04-27 RX ORDER — AMOXICILLIN AND CLAVULANATE POTASSIUM 875; 125 MG/1; MG/1
1 TABLET, FILM COATED ORAL 2 TIMES DAILY
COMMUNITY
End: 2021-08-25

## 2021-04-27 NOTE — PROGRESS NOTES
Luis Thompson  1940  PCP: Tram Mcgee MD    SUBJECTIVE:   Luis Thompson is a 80 y.o. male seen for a consultation visit regarding the following:     Chief Complaint: Atrial Fibrillation and congestive heart failure      HPI:  Patient has been cardiac stable. His chest tube had a mild infection.       History:  This is a 80-year-old patient referred by Tram Mcgee for further management of congestive heart failure, atrial fibrillation, and mitral valve disease.  Patient has a complex medical history including coronary artery disease along with a mechanical mitral valve replacement.  He has an EF of 10 to 15% along with a biventricular ICD.  His atrial fibrillation is classified as permanent in nature.  His biggest issue he has recurrent pleural effusions that required draining at least once a week.  Also has severe pulmonary hypertension.  He has class III heart failure symptoms on a daily basis.      Cardiac PMH: (Old records have been reviewed and summarized below)  1.  Chronic systolic heart failure-EF 10 to 15%  2.  Much regurgitation-status post mechanical mitral valve replacement  3.  History of a left atrial thrombus  4.  Coronary artery disease status coronary arteries bypass grafting  5.  Biventricular ICD-[employer]  6.  Atrial fibrillation-permanent nature  7.  Recurrent pleural effusions with Pleurx catheter in the right side  8.  Chronic kidney disease  9.  Severe pulmonary hypertension  10. Echo - March 2021 - Left ventricular ejection fraction appears to be 26 - 30%. Left ventricular systolic function is moderately decreased.    Past Medical History, Past Surgical History, Family history, Social History, and Medications were all reviewed with the patient today and updated as necessary.       Current Outpatient Medications:   •  allopurinol (ZYLOPRIM) 100 MG tablet, Take 300 mg by mouth Daily., Disp: , Rfl:   •  amoxicillin-clavulanate (AUGMENTIN) 875-125 MG per tablet, Take 1  tablet by mouth 2 (Two) Times a Day., Disp: , Rfl:   •  bumetanide (BUMEX) 1 MG tablet, Take 1 tablet by mouth 2 (Two) Times a Day., Disp: 60 tablet, Rfl: 3  •  carvedilol (COREG) 25 MG tablet, Take 25 mg by mouth Daily., Disp: , Rfl:   •  carvedilol (COREG) 25 MG tablet, Take  by mouth Daily., Disp: , Rfl:   •  eplerenone (INSPRA) 25 MG tablet, Take 1 tablet by mouth Daily., Disp: 30 tablet, Rfl: 1  •  levothyroxine (SYNTHROID, LEVOTHROID) 25 MCG tablet, Take 25 mcg by mouth Daily., Disp: , Rfl:   •  losartan (COZAAR) 25 MG tablet, Take 25 mg by mouth Daily., Disp: , Rfl:   •  metOLazone (ZAROXOLYN) 2.5 MG tablet, Take 2.5 mg by mouth Every Other Day. Every Mon, wed, fri, Disp: , Rfl:   •  potassium chloride (K-DUR,KLOR-CON) 10 MEQ CR tablet, Take 3 tablets by mouth 2 (Two) Times a Day., Disp: , Rfl:   •  warfarin (COUMADIN) 5 MG tablet, Take 5 mg by mouth Daily. Mon,Tues,Wed 5mg, Thursday-Sunday 4 mg, Disp: , Rfl:     Allergies   Allergen Reactions   • Iodinated Diagnostic Agents Hives   • Iodine Hives         Past Medical History:   Diagnosis Date   • Atrial fibrillation (CMS/HCC)    • CHF (congestive heart failure) (CMS/HCC)    • Coronary artery disease involving coronary bypass graft of native heart without angina pectoris    • Heart disease    • Hypertension      Past Surgical History:   Procedure Laterality Date   • ANKLE SURGERY     • CARDIAC PACEMAKER PLACEMENT     • CATARACT EXTRACTION     • COLONOSCOPY N/A 9/8/2017    Procedure: COLONOSCOPY;  Surgeon: Magan Hu MD;  Location: [facility] OR;  Service:    • CORONARY ARTERY BYPASS GRAFT     • ENDOSCOPY N/A 9/8/2017    Procedure: ESOPHAGOGASTRODUODENOSCOPY;  Surgeon: Magan Hu MD;  Location: [facility] OR;  Service:    • HERNIA REPAIR     • HYDROCELE EXCISION / REPAIR     • KNEE SURGERY       Family History   Problem Relation Age of Onset   • Stroke Mother    • Emphysema Father    • Other Brother         heart problems   • Stroke Half-Sister      Social  "History     Tobacco Use   • Smoking status: Never Smoker   • Smokeless tobacco: Never Used   Substance Use Topics   • Alcohol use: No            PHYSICAL EXAM:   /58 (BP Location: Left arm, Patient Position: Sitting)   Pulse 75   Temp 98.2 °F (36.8 °C)   Ht 177.8 cm (70\")   Wt 87.1 kg (192 lb)   SpO2 97%   BMI 27.55 kg/m²      Wt Readings from Last 5 Encounters:   04/27/21 87.1 kg (192 lb)   04/07/21 88.5 kg (195 lb)   03/23/21 88.5 kg (195 lb)   03/23/21 92.6 kg (204 lb 4 oz)   03/16/21 88.5 kg (195 lb)     BP Readings from Last 5 Encounters:   04/27/21 118/58   04/07/21 122/72   03/23/21 117/64   03/16/21 122/60   06/10/20 116/99       General-Well Nourished, Well developed  Eyes - PERRLA  Neck- supple, No mass, severe JVD  CV- regular rate and rhythm, mechanical valve sounds  Lung-decreased breath sounds bilaterally  Abd- soft, +BS  Musc/skel - Norm strength and range of motion  Skin- warm and dry  Neuro - Alert & Oriented x 3, appropriate mood.    Medical problems and test results were reviewed with the patient today.     Results for orders placed or performed in visit on 04/26/21   Magnesium    Specimen: Blood   Result Value Ref Range    Magnesium 2.1 1.6 - 2.4 mg/dL   Urinalysis With Microscopic If Indicated (No Culture) - Urine, Clean Catch    Specimen: Urine, Clean Catch   Result Value Ref Range    Color, UA Yellow Yellow, Straw    Appearance, UA Clear Clear    pH, UA 6.5 5.0 - 8.0    Specific Gravity, UA 1.010 1.005 - 1.030    Glucose, UA Negative Negative    Ketones, UA Negative Negative    Bilirubin, UA Negative Negative    Blood, UA Negative Negative    Protein, UA Negative Negative    Leuk Esterase, UA Negative Negative    Nitrite, UA Negative Negative    Urobilinogen, UA 0.2 E.U./dL 0.2 - 1.0 E.U./dL   Protein / Creatinine Ratio, Urine - Urine, Clean Catch    Specimen: Urine, Clean Catch   Result Value Ref Range    Protein/Creatinine Ratio, Urine 297.0 (H) 0.0 - 200.0 mg/G Crea    " Creatinine, Urine 30.3 mg/dL    Total Protein, Urine 9.0 mg/dL   Uric Acid    Specimen: Blood   Result Value Ref Range    Uric Acid 4.8 3.4 - 7.0 mg/dL         No results found for: CHOL, HDL, HDLC, LDL, LDLC, VLDL    EKG:  (EKG/Tracing has been independently visualized by me and summarized below)    Procedures    ASSESSMENT and PLAN  1.  Chronic systolic heart failure-EF of 10 to 15%-recurrent pleural effusions and heart failure admissions.  We will attempt to adjust his diuretic regiment by starting Aldactone 50 mg each day but he developed side effects. Has since been changed Inspra with good results. We also changed his Lasix over to Bumex due to his apparent hepatic and intestinal edema.  Overall appears to be doing much better. Following with [facility] on a regular basis   2.  Biventricular ICD-attempted to optimize by adjusting VDD timing  3.  Atrial fibrillation-permanent in nature  4.  Recurrent pleural effusion-currently has a Pleurx drain in place-hopeful with addition of Inspra and changing to Bumex that we can help with his congestion  5.  Severe pulmonary hypertension-remeasured by echo - 45-55mmHg  6.  Chronic kidney disease-monitor renal function with adjustment of diuretics - Currently is much improved.     Return in about 3 months (around 7/27/2021) for office visit and device check with Strobe.          Lawrence Alegria M.D., F.A.C.C, F.H.R.S.  Cardiology/Electrophysiology  04/27/21  14:11 EDT

## 2021-05-04 ENCOUNTER — TRANSCRIBE ORDERS (OUTPATIENT)
Dept: ADMINISTRATIVE | Facility: HOSPITAL | Age: 81
End: 2021-05-04

## 2021-05-04 DIAGNOSIS — N28.1 SIMPLE RENAL CYST: Primary | ICD-10-CM

## 2021-05-11 ENCOUNTER — HOSPITAL ENCOUNTER (OUTPATIENT)
Dept: INFUSION THERAPY | Facility: HOSPITAL | Age: 81
Setting detail: INFUSION SERIES
Discharge: HOME | End: 2021-05-11

## 2021-05-11 ENCOUNTER — TRANSCRIBE ORDERS (OUTPATIENT)
Dept: ADMINISTRATIVE | Facility: HOSPITAL | Age: 81
End: 2021-05-11

## 2021-05-11 ENCOUNTER — TRANSCRIBE ORDERS (OUTPATIENT)
Dept: INFUSION THERAPY | Facility: HOSPITAL | Age: 81
End: 2021-05-11

## 2021-05-11 ENCOUNTER — HOSPITAL ENCOUNTER (OUTPATIENT)
Dept: GENERAL RADIOLOGY | Facility: HOSPITAL | Age: 81
Discharge: HOME | End: 2021-05-11
Admitting: INTERNAL MEDICINE

## 2021-05-11 VITALS
TEMPERATURE: 97.8 F | SYSTOLIC BLOOD PRESSURE: 112 MMHG | RESPIRATION RATE: 18 BRPM | HEART RATE: 75 BPM | DIASTOLIC BLOOD PRESSURE: 57 MMHG

## 2021-05-11 DIAGNOSIS — L02.619 CELLULITIS AND ABSCESS OF FOOT: Primary | ICD-10-CM

## 2021-05-11 DIAGNOSIS — L03.115 CELLULITIS OF RIGHT FOOT: ICD-10-CM

## 2021-05-11 DIAGNOSIS — L02.611 CELLULITIS AND ABSCESS OF TOE OF RIGHT FOOT: Primary | ICD-10-CM

## 2021-05-11 DIAGNOSIS — L03.031 CELLULITIS AND ABSCESS OF TOE OF RIGHT FOOT: Primary | ICD-10-CM

## 2021-05-11 DIAGNOSIS — L03.119 CELLULITIS AND ABSCESS OF FOOT: Primary | ICD-10-CM

## 2021-05-11 DIAGNOSIS — L03.115 CELLULITIS OF RIGHT FOOT: Primary | ICD-10-CM

## 2021-05-11 PROCEDURE — 96366 THER/PROPH/DIAG IV INF ADDON: CPT

## 2021-05-11 PROCEDURE — 85652 RBC SED RATE AUTOMATED: CPT | Performed by: INTERNAL MEDICINE

## 2021-05-11 PROCEDURE — 73630 X-RAY EXAM OF FOOT: CPT

## 2021-05-11 PROCEDURE — 25010000002 CEFTRIAXONE PER 250 MG: Performed by: INTERNAL MEDICINE

## 2021-05-11 PROCEDURE — 73630 X-RAY EXAM OF FOOT: CPT | Performed by: RADIOLOGY

## 2021-05-11 PROCEDURE — 96365 THER/PROPH/DIAG IV INF INIT: CPT

## 2021-05-11 PROCEDURE — 96367 TX/PROPH/DG ADDL SEQ IV INF: CPT

## 2021-05-11 PROCEDURE — 86140 C-REACTIVE PROTEIN: CPT | Performed by: INTERNAL MEDICINE

## 2021-05-11 PROCEDURE — 25010000002 DAPTOMYCIN PER 1 MG: Performed by: INTERNAL MEDICINE

## 2021-05-11 PROCEDURE — 85025 COMPLETE CBC W/AUTO DIFF WBC: CPT | Performed by: INTERNAL MEDICINE

## 2021-05-11 PROCEDURE — 80048 BASIC METABOLIC PNL TOTAL CA: CPT | Performed by: INTERNAL MEDICINE

## 2021-05-11 RX ADMIN — SILVER SULFADIAZINE 1 APPLICATION: 10 CREAM TOPICAL at 15:13

## 2021-05-11 RX ADMIN — DAPTOMYCIN 500 MG: 500 INJECTION, POWDER, LYOPHILIZED, FOR SOLUTION INTRAVENOUS at 13:23

## 2021-05-11 RX ADMIN — CEFTRIAXONE 1 G: 1 INJECTION, POWDER, FOR SOLUTION INTRAMUSCULAR; INTRAVENOUS at 14:01

## 2021-05-12 ENCOUNTER — HOSPITAL ENCOUNTER (OUTPATIENT)
Dept: INFUSION THERAPY | Facility: HOSPITAL | Age: 81
Setting detail: INFUSION SERIES
Discharge: HOME | End: 2021-05-12

## 2021-05-12 VITALS
TEMPERATURE: 97.1 F | RESPIRATION RATE: 18 BRPM | HEART RATE: 74 BPM | DIASTOLIC BLOOD PRESSURE: 56 MMHG | SYSTOLIC BLOOD PRESSURE: 116 MMHG

## 2021-05-12 DIAGNOSIS — L03.119 CELLULITIS AND ABSCESS OF FOOT: Primary | ICD-10-CM

## 2021-05-12 DIAGNOSIS — L02.619 CELLULITIS AND ABSCESS OF FOOT: Primary | ICD-10-CM

## 2021-05-12 PROCEDURE — 25010000002 DAPTOMYCIN PER 1 MG: Performed by: INTERNAL MEDICINE

## 2021-05-12 PROCEDURE — 96365 THER/PROPH/DIAG IV INF INIT: CPT

## 2021-05-12 PROCEDURE — 87070 CULTURE OTHR SPECIMN AEROBIC: CPT | Performed by: INTERNAL MEDICINE

## 2021-05-12 PROCEDURE — 25010000002 CEFTRIAXONE PER 250 MG: Performed by: INTERNAL MEDICINE

## 2021-05-12 PROCEDURE — 87205 SMEAR GRAM STAIN: CPT | Performed by: INTERNAL MEDICINE

## 2021-05-12 PROCEDURE — 96367 TX/PROPH/DG ADDL SEQ IV INF: CPT

## 2021-05-12 RX ADMIN — DAPTOMYCIN 500 MG: 500 INJECTION, POWDER, LYOPHILIZED, FOR SOLUTION INTRAVENOUS at 08:44

## 2021-05-12 RX ADMIN — SILVER SULFADIAZINE 1 APPLICATION: 10 CREAM TOPICAL at 09:29

## 2021-05-12 RX ADMIN — CEFTRIAXONE 1 G: 1 INJECTION, POWDER, FOR SOLUTION INTRAMUSCULAR; INTRAVENOUS at 09:22

## 2021-05-13 ENCOUNTER — HOSPITAL ENCOUNTER (OUTPATIENT)
Dept: INFUSION THERAPY | Facility: HOSPITAL | Age: 81
Setting detail: INFUSION SERIES
Discharge: HOME | End: 2021-05-13

## 2021-05-13 VITALS
DIASTOLIC BLOOD PRESSURE: 60 MMHG | TEMPERATURE: 97.8 F | RESPIRATION RATE: 18 BRPM | HEART RATE: 74 BPM | SYSTOLIC BLOOD PRESSURE: 116 MMHG

## 2021-05-13 DIAGNOSIS — L02.619 CELLULITIS AND ABSCESS OF FOOT: Primary | ICD-10-CM

## 2021-05-13 DIAGNOSIS — L03.119 CELLULITIS AND ABSCESS OF FOOT: Primary | ICD-10-CM

## 2021-05-13 LAB
ANION GAP SERPL CALCULATED.3IONS-SCNC: 7.2 MMOL/L (ref 5–15)
BASOPHILS # BLD AUTO: 0.04 10*3/MM3 (ref 0–0.2)
BASOPHILS NFR BLD AUTO: 0.8 % (ref 0–1.5)
BUN SERPL-MCNC: 27 MG/DL (ref 8–23)
BUN/CREAT SERPL: 21.3 (ref 7–25)
CALCIUM SPEC-SCNC: 8.4 MG/DL (ref 8.6–10.5)
CHLORIDE SERPL-SCNC: 106 MMOL/L (ref 98–107)
CO2 SERPL-SCNC: 26.8 MMOL/L (ref 22–29)
CREAT SERPL-MCNC: 1.27 MG/DL (ref 0.76–1.27)
CRP SERPL-MCNC: 0.5 MG/DL (ref 0–0.5)
DEPRECATED RDW RBC AUTO: 59 FL (ref 37–54)
EOSINOPHIL # BLD AUTO: 0.22 10*3/MM3 (ref 0–0.4)
EOSINOPHIL NFR BLD AUTO: 4.2 % (ref 0.3–6.2)
ERYTHROCYTE [DISTWIDTH] IN BLOOD BY AUTOMATED COUNT: 17.9 % (ref 12.3–15.4)
ERYTHROCYTE [SEDIMENTATION RATE] IN BLOOD: 14 MM/HR (ref 0–20)
GFR SERPL CREATININE-BSD FRML MDRD: 55 ML/MIN/1.73
GLUCOSE SERPL-MCNC: 125 MG/DL (ref 65–99)
HCT VFR BLD AUTO: 37.5 % (ref 37.5–51)
HGB BLD-MCNC: 11.5 G/DL (ref 13–17.7)
IMM GRANULOCYTES # BLD AUTO: 0.02 10*3/MM3 (ref 0–0.05)
IMM GRANULOCYTES NFR BLD AUTO: 0.4 % (ref 0–0.5)
LYMPHOCYTES # BLD AUTO: 0.59 10*3/MM3 (ref 0.7–3.1)
LYMPHOCYTES NFR BLD AUTO: 11.4 % (ref 19.6–45.3)
MCH RBC QN AUTO: 27.8 PG (ref 26.6–33)
MCHC RBC AUTO-ENTMCNC: 30.7 G/DL (ref 31.5–35.7)
MCV RBC AUTO: 90.6 FL (ref 79–97)
MONOCYTES # BLD AUTO: 0.55 10*3/MM3 (ref 0.1–0.9)
MONOCYTES NFR BLD AUTO: 10.6 % (ref 5–12)
NEUTROPHILS NFR BLD AUTO: 3.76 10*3/MM3 (ref 1.7–7)
NEUTROPHILS NFR BLD AUTO: 72.6 % (ref 42.7–76)
NRBC BLD AUTO-RTO: 0 /100 WBC (ref 0–0.2)
PLATELET # BLD AUTO: 158 10*3/MM3 (ref 140–450)
PMV BLD AUTO: 10.8 FL (ref 6–12)
POTASSIUM SERPL-SCNC: 4 MMOL/L (ref 3.5–5.2)
RBC # BLD AUTO: 4.14 10*6/MM3 (ref 4.14–5.8)
SODIUM SERPL-SCNC: 140 MMOL/L (ref 136–145)
WBC # BLD AUTO: 5.18 10*3/MM3 (ref 3.4–10.8)

## 2021-05-13 PROCEDURE — 96367 TX/PROPH/DG ADDL SEQ IV INF: CPT

## 2021-05-13 PROCEDURE — 80048 BASIC METABOLIC PNL TOTAL CA: CPT | Performed by: INTERNAL MEDICINE

## 2021-05-13 PROCEDURE — 96365 THER/PROPH/DIAG IV INF INIT: CPT

## 2021-05-13 PROCEDURE — 25010000002 DAPTOMYCIN PER 1 MG: Performed by: INTERNAL MEDICINE

## 2021-05-13 PROCEDURE — 85025 COMPLETE CBC W/AUTO DIFF WBC: CPT | Performed by: INTERNAL MEDICINE

## 2021-05-13 PROCEDURE — 86140 C-REACTIVE PROTEIN: CPT | Performed by: INTERNAL MEDICINE

## 2021-05-13 PROCEDURE — 85652 RBC SED RATE AUTOMATED: CPT | Performed by: INTERNAL MEDICINE

## 2021-05-13 PROCEDURE — 25010000002 CEFTRIAXONE PER 250 MG: Performed by: INTERNAL MEDICINE

## 2021-05-13 RX ADMIN — DAPTOMYCIN 500 MG: 500 INJECTION, POWDER, LYOPHILIZED, FOR SOLUTION INTRAVENOUS at 08:25

## 2021-05-13 RX ADMIN — CEFTRIAXONE 1 G: 1 INJECTION, POWDER, FOR SOLUTION INTRAMUSCULAR; INTRAVENOUS at 09:00

## 2021-05-14 ENCOUNTER — HOSPITAL ENCOUNTER (OUTPATIENT)
Dept: INFUSION THERAPY | Facility: HOSPITAL | Age: 81
Setting detail: INFUSION SERIES
Discharge: HOME | End: 2021-05-14

## 2021-05-14 VITALS — DIASTOLIC BLOOD PRESSURE: 78 MMHG | RESPIRATION RATE: 18 BRPM | SYSTOLIC BLOOD PRESSURE: 133 MMHG | HEART RATE: 77 BPM

## 2021-05-14 DIAGNOSIS — L03.119 CELLULITIS AND ABSCESS OF FOOT: Primary | ICD-10-CM

## 2021-05-14 DIAGNOSIS — L02.619 CELLULITIS AND ABSCESS OF FOOT: Primary | ICD-10-CM

## 2021-05-14 LAB
BACTERIA SPEC AEROBE CULT: NORMAL
GRAM STN SPEC: NORMAL
GRAM STN SPEC: NORMAL

## 2021-05-14 PROCEDURE — 96365 THER/PROPH/DIAG IV INF INIT: CPT

## 2021-05-14 PROCEDURE — 25010000002 DAPTOMYCIN PER 1 MG: Performed by: INTERNAL MEDICINE

## 2021-05-14 PROCEDURE — 25010000002 CEFTRIAXONE PER 250 MG: Performed by: INTERNAL MEDICINE

## 2021-05-14 PROCEDURE — 96367 TX/PROPH/DG ADDL SEQ IV INF: CPT

## 2021-05-14 RX ADMIN — CEFTRIAXONE 1 G: 1 INJECTION, POWDER, FOR SOLUTION INTRAMUSCULAR; INTRAVENOUS at 08:37

## 2021-05-14 RX ADMIN — DAPTOMYCIN 500 MG: 500 INJECTION, POWDER, LYOPHILIZED, FOR SOLUTION INTRAVENOUS at 09:12

## 2021-05-15 ENCOUNTER — HOSPITAL ENCOUNTER (OUTPATIENT)
Dept: INFUSION THERAPY | Facility: HOSPITAL | Age: 81
Setting detail: INFUSION SERIES
Discharge: HOME | End: 2021-05-15

## 2021-05-15 VITALS
HEART RATE: 78 BPM | RESPIRATION RATE: 17 BRPM | SYSTOLIC BLOOD PRESSURE: 136 MMHG | DIASTOLIC BLOOD PRESSURE: 68 MMHG | TEMPERATURE: 98 F

## 2021-05-15 DIAGNOSIS — L02.619 CELLULITIS AND ABSCESS OF FOOT: Primary | ICD-10-CM

## 2021-05-15 DIAGNOSIS — L03.119 CELLULITIS AND ABSCESS OF FOOT: Primary | ICD-10-CM

## 2021-05-15 LAB
ANION GAP SERPL CALCULATED.3IONS-SCNC: 8.4 MMOL/L (ref 5–15)
BASOPHILS # BLD AUTO: 0.06 10*3/MM3 (ref 0–0.2)
BASOPHILS NFR BLD AUTO: 0.9 % (ref 0–1.5)
BUN SERPL-MCNC: 24 MG/DL (ref 8–23)
BUN/CREAT SERPL: 19.8 (ref 7–25)
CALCIUM SPEC-SCNC: 8.7 MG/DL (ref 8.6–10.5)
CHLORIDE SERPL-SCNC: 105 MMOL/L (ref 98–107)
CO2 SERPL-SCNC: 26.6 MMOL/L (ref 22–29)
CREAT SERPL-MCNC: 1.21 MG/DL (ref 0.76–1.27)
CRP SERPL-MCNC: 0.8 MG/DL (ref 0–0.5)
DEPRECATED RDW RBC AUTO: 59.8 FL (ref 37–54)
EOSINOPHIL # BLD AUTO: 0.23 10*3/MM3 (ref 0–0.4)
EOSINOPHIL NFR BLD AUTO: 3.3 % (ref 0.3–6.2)
ERYTHROCYTE [DISTWIDTH] IN BLOOD BY AUTOMATED COUNT: 17.9 % (ref 12.3–15.4)
ERYTHROCYTE [SEDIMENTATION RATE] IN BLOOD: 12 MM/HR (ref 0–20)
GFR SERPL CREATININE-BSD FRML MDRD: 58 ML/MIN/1.73
GLUCOSE SERPL-MCNC: 122 MG/DL (ref 65–99)
HCT VFR BLD AUTO: 40.1 % (ref 37.5–51)
HGB BLD-MCNC: 12.5 G/DL (ref 13–17.7)
IMM GRANULOCYTES # BLD AUTO: 0.03 10*3/MM3 (ref 0–0.05)
IMM GRANULOCYTES NFR BLD AUTO: 0.4 % (ref 0–0.5)
LYMPHOCYTES # BLD AUTO: 0.81 10*3/MM3 (ref 0.7–3.1)
LYMPHOCYTES NFR BLD AUTO: 11.6 % (ref 19.6–45.3)
MCH RBC QN AUTO: 28.3 PG (ref 26.6–33)
MCHC RBC AUTO-ENTMCNC: 31.2 G/DL (ref 31.5–35.7)
MCV RBC AUTO: 90.9 FL (ref 79–97)
MONOCYTES # BLD AUTO: 0.72 10*3/MM3 (ref 0.1–0.9)
MONOCYTES NFR BLD AUTO: 10.3 % (ref 5–12)
NEUTROPHILS NFR BLD AUTO: 5.15 10*3/MM3 (ref 1.7–7)
NEUTROPHILS NFR BLD AUTO: 73.5 % (ref 42.7–76)
NRBC BLD AUTO-RTO: 0 /100 WBC (ref 0–0.2)
PLATELET # BLD AUTO: 163 10*3/MM3 (ref 140–450)
PMV BLD AUTO: 10.7 FL (ref 6–12)
POTASSIUM SERPL-SCNC: 4.4 MMOL/L (ref 3.5–5.2)
RBC # BLD AUTO: 4.41 10*6/MM3 (ref 4.14–5.8)
SODIUM SERPL-SCNC: 140 MMOL/L (ref 136–145)
WBC # BLD AUTO: 7 10*3/MM3 (ref 3.4–10.8)

## 2021-05-15 PROCEDURE — 85652 RBC SED RATE AUTOMATED: CPT | Performed by: INTERNAL MEDICINE

## 2021-05-15 PROCEDURE — 80048 BASIC METABOLIC PNL TOTAL CA: CPT | Performed by: INTERNAL MEDICINE

## 2021-05-15 PROCEDURE — 36415 COLL VENOUS BLD VENIPUNCTURE: CPT

## 2021-05-15 PROCEDURE — 25010000002 CEFTRIAXONE PER 250 MG: Performed by: INTERNAL MEDICINE

## 2021-05-15 PROCEDURE — 85025 COMPLETE CBC W/AUTO DIFF WBC: CPT | Performed by: INTERNAL MEDICINE

## 2021-05-15 PROCEDURE — 25010000002 DAPTOMYCIN PER 1 MG: Performed by: INTERNAL MEDICINE

## 2021-05-15 PROCEDURE — 96365 THER/PROPH/DIAG IV INF INIT: CPT

## 2021-05-15 PROCEDURE — 86140 C-REACTIVE PROTEIN: CPT | Performed by: INTERNAL MEDICINE

## 2021-05-15 PROCEDURE — 96367 TX/PROPH/DG ADDL SEQ IV INF: CPT

## 2021-05-15 RX ADMIN — DAPTOMYCIN 500 MG: 500 INJECTION, POWDER, LYOPHILIZED, FOR SOLUTION INTRAVENOUS at 09:41

## 2021-05-15 RX ADMIN — CEFTRIAXONE 1 G: 1 INJECTION, POWDER, FOR SOLUTION INTRAMUSCULAR; INTRAVENOUS at 09:04

## 2021-05-15 RX ADMIN — SILVER SULFADIAZINE 1 APPLICATION: 10 CREAM TOPICAL at 09:07

## 2021-05-16 ENCOUNTER — HOSPITAL ENCOUNTER (OUTPATIENT)
Dept: INFUSION THERAPY | Facility: HOSPITAL | Age: 81
Setting detail: INFUSION SERIES
Discharge: HOME | End: 2021-05-16

## 2021-05-16 VITALS
HEART RATE: 77 BPM | TEMPERATURE: 98.4 F | SYSTOLIC BLOOD PRESSURE: 137 MMHG | RESPIRATION RATE: 17 BRPM | DIASTOLIC BLOOD PRESSURE: 69 MMHG

## 2021-05-16 DIAGNOSIS — L02.619 CELLULITIS AND ABSCESS OF FOOT: Primary | ICD-10-CM

## 2021-05-16 DIAGNOSIS — L03.119 CELLULITIS AND ABSCESS OF FOOT: Primary | ICD-10-CM

## 2021-05-16 PROCEDURE — 96367 TX/PROPH/DG ADDL SEQ IV INF: CPT

## 2021-05-16 PROCEDURE — 96365 THER/PROPH/DIAG IV INF INIT: CPT

## 2021-05-16 PROCEDURE — 25010000002 DAPTOMYCIN PER 1 MG: Performed by: INTERNAL MEDICINE

## 2021-05-16 PROCEDURE — 25010000002 CEFTRIAXONE PER 250 MG: Performed by: INTERNAL MEDICINE

## 2021-05-16 RX ADMIN — DAPTOMYCIN 500 MG: 500 INJECTION, POWDER, LYOPHILIZED, FOR SOLUTION INTRAVENOUS at 09:29

## 2021-05-16 RX ADMIN — CEFTRIAXONE 1 G: 1 INJECTION, POWDER, FOR SOLUTION INTRAMUSCULAR; INTRAVENOUS at 10:03

## 2021-05-17 ENCOUNTER — HOSPITAL ENCOUNTER (OUTPATIENT)
Dept: INFUSION THERAPY | Facility: HOSPITAL | Age: 81
Setting detail: INFUSION SERIES
Discharge: HOME | End: 2021-05-17

## 2021-05-17 VITALS
HEART RATE: 75 BPM | RESPIRATION RATE: 18 BRPM | SYSTOLIC BLOOD PRESSURE: 121 MMHG | DIASTOLIC BLOOD PRESSURE: 65 MMHG | TEMPERATURE: 98.6 F

## 2021-05-17 DIAGNOSIS — L02.619 CELLULITIS AND ABSCESS OF FOOT: Primary | ICD-10-CM

## 2021-05-17 DIAGNOSIS — L03.119 CELLULITIS AND ABSCESS OF FOOT: Primary | ICD-10-CM

## 2021-05-17 LAB
ANION GAP SERPL CALCULATED.3IONS-SCNC: 8.4 MMOL/L (ref 5–15)
BASOPHILS # BLD AUTO: 0.06 10*3/MM3 (ref 0–0.2)
BASOPHILS NFR BLD AUTO: 1 % (ref 0–1.5)
BUN SERPL-MCNC: 31 MG/DL (ref 8–23)
BUN/CREAT SERPL: 24.8 (ref 7–25)
CALCIUM SPEC-SCNC: 8.9 MG/DL (ref 8.6–10.5)
CHLORIDE SERPL-SCNC: 101 MMOL/L (ref 98–107)
CO2 SERPL-SCNC: 26.6 MMOL/L (ref 22–29)
CREAT SERPL-MCNC: 1.25 MG/DL (ref 0.76–1.27)
CRP SERPL-MCNC: 0.74 MG/DL (ref 0–0.5)
DEPRECATED RDW RBC AUTO: 57.5 FL (ref 37–54)
EOSINOPHIL # BLD AUTO: 0.26 10*3/MM3 (ref 0–0.4)
EOSINOPHIL NFR BLD AUTO: 4.4 % (ref 0.3–6.2)
ERYTHROCYTE [DISTWIDTH] IN BLOOD BY AUTOMATED COUNT: 17.6 % (ref 12.3–15.4)
ERYTHROCYTE [SEDIMENTATION RATE] IN BLOOD: 15 MM/HR (ref 0–20)
GFR SERPL CREATININE-BSD FRML MDRD: 56 ML/MIN/1.73
GLUCOSE SERPL-MCNC: 74 MG/DL (ref 65–99)
HCT VFR BLD AUTO: 39.2 % (ref 37.5–51)
HGB BLD-MCNC: 12.1 G/DL (ref 13–17.7)
IMM GRANULOCYTES # BLD AUTO: 0.02 10*3/MM3 (ref 0–0.05)
IMM GRANULOCYTES NFR BLD AUTO: 0.3 % (ref 0–0.5)
LYMPHOCYTES # BLD AUTO: 0.88 10*3/MM3 (ref 0.7–3.1)
LYMPHOCYTES NFR BLD AUTO: 14.8 % (ref 19.6–45.3)
MCH RBC QN AUTO: 27.8 PG (ref 26.6–33)
MCHC RBC AUTO-ENTMCNC: 30.9 G/DL (ref 31.5–35.7)
MCV RBC AUTO: 89.9 FL (ref 79–97)
MONOCYTES # BLD AUTO: 0.65 10*3/MM3 (ref 0.1–0.9)
MONOCYTES NFR BLD AUTO: 10.9 % (ref 5–12)
NEUTROPHILS NFR BLD AUTO: 4.09 10*3/MM3 (ref 1.7–7)
NEUTROPHILS NFR BLD AUTO: 68.6 % (ref 42.7–76)
NRBC BLD AUTO-RTO: 0 /100 WBC (ref 0–0.2)
PLATELET # BLD AUTO: 172 10*3/MM3 (ref 140–450)
PMV BLD AUTO: 11.2 FL (ref 6–12)
POTASSIUM SERPL-SCNC: 5 MMOL/L (ref 3.5–5.2)
RBC # BLD AUTO: 4.36 10*6/MM3 (ref 4.14–5.8)
SODIUM SERPL-SCNC: 136 MMOL/L (ref 136–145)
WBC # BLD AUTO: 5.96 10*3/MM3 (ref 3.4–10.8)

## 2021-05-17 PROCEDURE — 96365 THER/PROPH/DIAG IV INF INIT: CPT

## 2021-05-17 PROCEDURE — 25010000002 DAPTOMYCIN PER 1 MG: Performed by: INTERNAL MEDICINE

## 2021-05-17 PROCEDURE — 85652 RBC SED RATE AUTOMATED: CPT | Performed by: INTERNAL MEDICINE

## 2021-05-17 PROCEDURE — 25010000002 CEFTRIAXONE PER 250 MG: Performed by: INTERNAL MEDICINE

## 2021-05-17 PROCEDURE — 86140 C-REACTIVE PROTEIN: CPT | Performed by: INTERNAL MEDICINE

## 2021-05-17 PROCEDURE — 85025 COMPLETE CBC W/AUTO DIFF WBC: CPT | Performed by: INTERNAL MEDICINE

## 2021-05-17 PROCEDURE — 96367 TX/PROPH/DG ADDL SEQ IV INF: CPT

## 2021-05-17 PROCEDURE — 80048 BASIC METABOLIC PNL TOTAL CA: CPT | Performed by: INTERNAL MEDICINE

## 2021-05-17 RX ADMIN — DAPTOMYCIN 500 MG: 500 INJECTION, POWDER, LYOPHILIZED, FOR SOLUTION INTRAVENOUS at 09:15

## 2021-05-17 RX ADMIN — CEFTRIAXONE 1 G: 1 INJECTION, POWDER, FOR SOLUTION INTRAMUSCULAR; INTRAVENOUS at 08:45

## 2021-05-18 ENCOUNTER — HOSPITAL ENCOUNTER (OUTPATIENT)
Dept: INFUSION THERAPY | Facility: HOSPITAL | Age: 81
Setting detail: INFUSION SERIES
Discharge: HOME | End: 2021-05-18

## 2021-05-18 VITALS
TEMPERATURE: 98.2 F | HEART RATE: 75 BPM | SYSTOLIC BLOOD PRESSURE: 102 MMHG | RESPIRATION RATE: 17 BRPM | DIASTOLIC BLOOD PRESSURE: 56 MMHG

## 2021-05-18 DIAGNOSIS — L02.619 CELLULITIS AND ABSCESS OF FOOT: Primary | ICD-10-CM

## 2021-05-18 DIAGNOSIS — L03.119 CELLULITIS AND ABSCESS OF FOOT: Primary | ICD-10-CM

## 2021-05-18 PROCEDURE — 96367 TX/PROPH/DG ADDL SEQ IV INF: CPT

## 2021-05-18 PROCEDURE — 25010000002 CEFTRIAXONE PER 250 MG: Performed by: INTERNAL MEDICINE

## 2021-05-18 PROCEDURE — 25010000002 DAPTOMYCIN PER 1 MG: Performed by: INTERNAL MEDICINE

## 2021-05-18 PROCEDURE — 96365 THER/PROPH/DIAG IV INF INIT: CPT

## 2021-05-18 RX ADMIN — DAPTOMYCIN 500 MG: 500 INJECTION, POWDER, LYOPHILIZED, FOR SOLUTION INTRAVENOUS at 08:36

## 2021-05-18 RX ADMIN — SILVER SULFADIAZINE: 10 CREAM TOPICAL at 09:29

## 2021-05-18 RX ADMIN — CEFTRIAXONE 1 G: 1 INJECTION, POWDER, FOR SOLUTION INTRAMUSCULAR; INTRAVENOUS at 09:35

## 2021-05-19 ENCOUNTER — HOSPITAL ENCOUNTER (OUTPATIENT)
Dept: INFUSION THERAPY | Facility: HOSPITAL | Age: 81
Setting detail: INFUSION SERIES
Discharge: HOME | End: 2021-05-19

## 2021-05-19 ENCOUNTER — APPOINTMENT (OUTPATIENT)
Dept: CARDIOLOGY | Facility: HOSPITAL | Age: 81
End: 2021-05-19

## 2021-05-19 VITALS
HEART RATE: 72 BPM | SYSTOLIC BLOOD PRESSURE: 109 MMHG | DIASTOLIC BLOOD PRESSURE: 75 MMHG | RESPIRATION RATE: 18 BRPM | TEMPERATURE: 98.8 F

## 2021-05-19 DIAGNOSIS — L03.119 CELLULITIS AND ABSCESS OF FOOT: Primary | ICD-10-CM

## 2021-05-19 DIAGNOSIS — L02.619 CELLULITIS AND ABSCESS OF FOOT: Primary | ICD-10-CM

## 2021-05-19 LAB
ANION GAP SERPL CALCULATED.3IONS-SCNC: 8.6 MMOL/L (ref 5–15)
BASOPHILS # BLD AUTO: 0.05 10*3/MM3 (ref 0–0.2)
BASOPHILS NFR BLD AUTO: 0.8 % (ref 0–1.5)
BUN SERPL-MCNC: 29 MG/DL (ref 8–23)
BUN/CREAT SERPL: 21.5 (ref 7–25)
CALCIUM SPEC-SCNC: 8.4 MG/DL (ref 8.6–10.5)
CHLORIDE SERPL-SCNC: 103 MMOL/L (ref 98–107)
CO2 SERPL-SCNC: 23.4 MMOL/L (ref 22–29)
CREAT SERPL-MCNC: 1.35 MG/DL (ref 0.76–1.27)
CRP SERPL-MCNC: 0.55 MG/DL (ref 0–0.5)
DEPRECATED RDW RBC AUTO: 59.2 FL (ref 37–54)
EOSINOPHIL # BLD AUTO: 0.21 10*3/MM3 (ref 0–0.4)
EOSINOPHIL NFR BLD AUTO: 3.4 % (ref 0.3–6.2)
ERYTHROCYTE [DISTWIDTH] IN BLOOD BY AUTOMATED COUNT: 17.6 % (ref 12.3–15.4)
ERYTHROCYTE [SEDIMENTATION RATE] IN BLOOD: 7 MM/HR (ref 0–20)
GFR SERPL CREATININE-BSD FRML MDRD: 51 ML/MIN/1.73
GLUCOSE SERPL-MCNC: 138 MG/DL (ref 65–99)
HCT VFR BLD AUTO: 39.6 % (ref 37.5–51)
HGB BLD-MCNC: 12.1 G/DL (ref 13–17.7)
IMM GRANULOCYTES # BLD AUTO: 0.02 10*3/MM3 (ref 0–0.05)
IMM GRANULOCYTES NFR BLD AUTO: 0.3 % (ref 0–0.5)
LYMPHOCYTES # BLD AUTO: 0.92 10*3/MM3 (ref 0.7–3.1)
LYMPHOCYTES NFR BLD AUTO: 15 % (ref 19.6–45.3)
MCH RBC QN AUTO: 28 PG (ref 26.6–33)
MCHC RBC AUTO-ENTMCNC: 30.6 G/DL (ref 31.5–35.7)
MCV RBC AUTO: 91.7 FL (ref 79–97)
MONOCYTES # BLD AUTO: 0.75 10*3/MM3 (ref 0.1–0.9)
MONOCYTES NFR BLD AUTO: 12.3 % (ref 5–12)
NEUTROPHILS NFR BLD AUTO: 4.17 10*3/MM3 (ref 1.7–7)
NEUTROPHILS NFR BLD AUTO: 68.2 % (ref 42.7–76)
NRBC BLD AUTO-RTO: 0 /100 WBC (ref 0–0.2)
PLATELET # BLD AUTO: 144 10*3/MM3 (ref 140–450)
PMV BLD AUTO: 11.6 FL (ref 6–12)
POTASSIUM SERPL-SCNC: 5.7 MMOL/L (ref 3.5–5.2)
RBC # BLD AUTO: 4.32 10*6/MM3 (ref 4.14–5.8)
SODIUM SERPL-SCNC: 135 MMOL/L (ref 136–145)
WBC # BLD AUTO: 6.12 10*3/MM3 (ref 3.4–10.8)

## 2021-05-19 PROCEDURE — 86140 C-REACTIVE PROTEIN: CPT | Performed by: INTERNAL MEDICINE

## 2021-05-19 PROCEDURE — 85025 COMPLETE CBC W/AUTO DIFF WBC: CPT | Performed by: INTERNAL MEDICINE

## 2021-05-19 PROCEDURE — 25010000002 CEFTRIAXONE: Performed by: INTERNAL MEDICINE

## 2021-05-19 PROCEDURE — 96367 TX/PROPH/DG ADDL SEQ IV INF: CPT

## 2021-05-19 PROCEDURE — 25010000002 DAPTOMYCIN PER 1 MG: Performed by: INTERNAL MEDICINE

## 2021-05-19 PROCEDURE — 96365 THER/PROPH/DIAG IV INF INIT: CPT

## 2021-05-19 PROCEDURE — 80048 BASIC METABOLIC PNL TOTAL CA: CPT | Performed by: INTERNAL MEDICINE

## 2021-05-19 PROCEDURE — 85652 RBC SED RATE AUTOMATED: CPT | Performed by: INTERNAL MEDICINE

## 2021-05-19 RX ADMIN — DAPTOMYCIN 500 MG: 500 INJECTION, POWDER, LYOPHILIZED, FOR SOLUTION INTRAVENOUS at 08:24

## 2021-05-19 RX ADMIN — CEFTRIAXONE 1 G: 1 INJECTION, POWDER, FOR SOLUTION INTRAMUSCULAR; INTRAVENOUS at 08:55

## 2021-05-19 RX ADMIN — SILVER SULFADIAZINE: 10 CREAM TOPICAL at 15:03

## 2021-05-19 NOTE — PROGRESS NOTES
[facility], [facility]    This was an audio  enabled telemedicine encounter.    Chief Complaint  No chief complaint on file.    Subjective    History of Present Illness {CC  Problem List  Visit  Diagnosis   Encounters  Notes  Medications  Labs  Result Review Imaging  Media :23}     Luis Thompson presents to [facility] CARDIOLOGY for   History of Present Illness     80-year-old male with chronic systolic heart failure with BiV ICD, mitral regurgitation status post mechanical mitral valve replacement, CAD status post coronary artery bypass grafting, permanent A. fib, recurrent pleural effusions with Pleurx catheter, chronic kidney disease, severe pulmonary hypertension.    Patient currently on carvedilol, losartan, Bumex, eplerenone.  Patient felt that spironolactone worsened his edema.  He will take metolazone 2.5 mg every other day if needed.     Echocardiogram 3/25/2021 with EF 26 to 30%, severe TR with RVSP 45 to 55 mmHg, moderate MR.  EF improved from 10 to 15%.    Pt has develeped cellulitis of right lower limb.  Receiving outpatient antibiotics.      Objective     Vital Signs:   There were no vitals filed for this visit.  There is no height or weight on file to calculate BMI.  Physical Exam         Result Review  Data Reviewed:{ Labs  Result Review  Imaging  Med Tab  Media :23}     Lab Results   Component Value Date    WBC 5.96 05/17/2021    HGB 12.1 (L) 05/17/2021    HCT 39.2 05/17/2021    MCV 89.9 05/17/2021     05/17/2021     Lab Results   Component Value Date    GLUCOSE 74 05/17/2021    CALCIUM 8.9 05/17/2021     05/17/2021    K 5.0 05/17/2021    CO2 26.6 05/17/2021     05/17/2021    BUN 31 (H) 05/17/2021    CREATININE 1.25 05/17/2021    EGFRIFNONA 56 (L) 05/17/2021    BCR 24.8 05/17/2021    ANIONGAP 8.4 05/17/2021       Assessment and Plan {CC Problem List  Visit Diagnosis  ROS  Review (Popup)  Health  Maintenance  Quality  BestPractice  Medications  SmartSets  SnapShot Encounters  Media :23}   1. Chronic systolic congestive heart failure (CMS/HCC)  ***    2. Pulmonary hypertension (CMS/HCC)  ***    3. Pleural effusion  ***      {Time Spent (Optional):75846}    Follow Up {Instructions Charge Capture  Follow-up Communications :23}   No follow-ups on file.    Patient was given instructions and counseling regarding his condition or for health maintenance advice. Please see specific information pulled into the AVS if appropriate.  Patient was instructed to call the [facility] with any questions, concerns, or worsening symptoms.    *Please note that portions of this note were completed with a voice recognition program. Efforts were made to edit the dictations, but occasionally words are mistranscribed.

## 2021-05-20 ENCOUNTER — OFFICE VISIT (OUTPATIENT)
Dept: CARDIOLOGY | Facility: HOSPITAL | Age: 81
End: 2021-05-20

## 2021-05-20 ENCOUNTER — HOSPITAL ENCOUNTER (OUTPATIENT)
Dept: INFUSION THERAPY | Facility: HOSPITAL | Age: 81
Setting detail: INFUSION SERIES
Discharge: HOME | End: 2021-05-20

## 2021-05-20 VITALS
SYSTOLIC BLOOD PRESSURE: 115 MMHG | WEIGHT: 196 LBS | DIASTOLIC BLOOD PRESSURE: 56 MMHG | HEIGHT: 70 IN | HEART RATE: 74 BPM | BODY MASS INDEX: 28.06 KG/M2

## 2021-05-20 VITALS
SYSTOLIC BLOOD PRESSURE: 101 MMHG | DIASTOLIC BLOOD PRESSURE: 54 MMHG | HEART RATE: 75 BPM | TEMPERATURE: 98.8 F | RESPIRATION RATE: 18 BRPM

## 2021-05-20 DIAGNOSIS — L03.119 CELLULITIS AND ABSCESS OF FOOT: Primary | ICD-10-CM

## 2021-05-20 DIAGNOSIS — I50.42 CHRONIC COMBINED SYSTOLIC AND DIASTOLIC HEART FAILURE (HCC): ICD-10-CM

## 2021-05-20 DIAGNOSIS — I48.21 PERMANENT ATRIAL FIBRILLATION (HCC): ICD-10-CM

## 2021-05-20 DIAGNOSIS — L02.619 CELLULITIS AND ABSCESS OF FOOT: Primary | ICD-10-CM

## 2021-05-20 DIAGNOSIS — J90 PLEURAL EFFUSION: ICD-10-CM

## 2021-05-20 DIAGNOSIS — E87.5 HYPERKALEMIA: ICD-10-CM

## 2021-05-20 PROCEDURE — 96367 TX/PROPH/DG ADDL SEQ IV INF: CPT

## 2021-05-20 PROCEDURE — 99442 PR PHYS/QHP TELEPHONE EVALUATION 11-20 MIN: CPT | Performed by: NURSE PRACTITIONER

## 2021-05-20 PROCEDURE — 96365 THER/PROPH/DIAG IV INF INIT: CPT

## 2021-05-20 PROCEDURE — 25010000002 CEFTRIAXONE PER 250 MG: Performed by: INTERNAL MEDICINE

## 2021-05-20 PROCEDURE — 25010000002 DAPTOMYCIN PER 1 MG: Performed by: INTERNAL MEDICINE

## 2021-05-20 RX ORDER — METOLAZONE 2.5 MG/1
2.5 TABLET ORAL AS NEEDED
Start: 2021-05-20 | End: 2022-04-05 | Stop reason: SDUPTHER

## 2021-05-20 RX ADMIN — CEFTRIAXONE 1 G: 1 INJECTION, POWDER, FOR SOLUTION INTRAMUSCULAR; INTRAVENOUS at 08:14

## 2021-05-20 RX ADMIN — DAPTOMYCIN 500 MG: 500 INJECTION, POWDER, LYOPHILIZED, FOR SOLUTION INTRAVENOUS at 08:48

## 2021-05-20 NOTE — PROGRESS NOTES
[facility], [facility] Heart [facility] V[facility]    You have chosen to receive care through the use of telemedicine. Telemedicine enables health care providers at different locations to provide safe, effective, and convenient care through the use of technology. As with any health care service, there are risks associated with the use of telemedicine, including equipment failure, poor connections, and  issues.    • Do you understand the risks and benefits of telemedicine as I have explained them to you? Yes  • Have your questions regarding telemedicine been answered? Yes  • Do you consent to the use of telemedicine in your medical care today? Yes    This was an audio  enabled telemedicine encounter.      Chief Complaint  No chief complaint on file.    Subjective    History of Present Illness {CC  Problem List  Visit  Diagnosis   Encounters  Notes  Medications  Labs  Result Review Imaging  Media :23}     Luis Thompson presents to [facility] CARDIOLOGY for   History of Present Illness   80-year-old male with chronic systolic heart failure with BiV ICD, mitral regurgitation status post mechanical mitral valve replacement, CAD status post coronary artery bypass grafting, permanent A. fib, recurrent pleural effusions with Pleurx catheter, chronic kidney disease, severe pulmonary hypertension.     Patient currently on carvedilol, losartan, Bumex, eplerenone. Pt did not tolerate aldactone.      Echocardiogram 3/25/2021 with EF 26 to 30%, severe TR with RVSP 45 to 55 mmHg, moderate MR.  EF improved from 10 to 15%.    No CP, pressure. Plan to see Dr. Ray to be evaluated for Pleuradesis.   Draining 2-3 times per week (700-1200 ml each time).  Pt reports he stopped metolazone and only uses PRN.     Recent elevated potassium.  Potassium has been stopped.  Repeat potassium labs to be completed next week with antibiotic infusion.    Edema has improved.  No  "abdominal fullness or weight gain.      Pt being treated for cellulitis (IV antibiotics).    Objective     Vital Signs:   Vitals:    05/20/21 1221   BP: 115/56   Pulse: 74   Weight: 88.9 kg (196 lb)   Height: 177.8 cm (70\")     Body mass index is 28.12 kg/m².  Physical Exam     A/o, non labored interview.  Good historian.     Result Review  Data Reviewed:{ Labs  Result Review  Imaging  Med Tab  Media :23}     Lab Results   Component Value Date    WBC 6.12 05/19/2021    HGB 12.1 (L) 05/19/2021    HCT 39.6 05/19/2021    MCV 91.7 05/19/2021     05/19/2021     Lab Results   Component Value Date    GLUCOSE 138 (H) 05/19/2021    CALCIUM 8.4 (L) 05/19/2021     (L) 05/19/2021    K 5.7 (H) 05/19/2021    CO2 23.4 05/19/2021     05/19/2021    BUN 29 (H) 05/19/2021    CREATININE 1.35 (H) 05/19/2021    EGFRIFNONA 51 (L) 05/19/2021    BCR 21.5 05/19/2021    ANIONGAP 8.6 05/19/2021       Assessment and Plan {CC Problem List  Visit Diagnosis  ROS  Review (Popup)  Health Maintenance  Quality  BestPractice  Medications  SmartSets  SnapShot Encounters  Media :23}   1. Chronic combined systolic and diastolic heart failure (CMS/HCC)  Bumex, PRN metolazone    Coreg, inspra, losartan    EF 30%    2. Permanent atrial fibrillation (CMS/HCC)  HR controlled    coumadin    3. Pleural effusion  Recurrent  Plan to see CT Sx for evaluation of pleurodesis    4. Hyperkalemia  Stop potassium   Current on inspra  Repeat bmp 1 week.       I spent 15 minutes caring for Luis on this date of service. This time includes time spent by me in the following activities:preparing for the visit, reviewing tests, performing a medically appropriate examination and/or evaluation , counseling and educating the patient/family/caregiver and documenting information in the medical record    Follow Up {Instructions Charge Capture  Follow-up Communications :23}   Return in about 6 weeks (around 7/1/2021).    Patient was given " instructions and counseling regarding his condition or for health maintenance advice. Please see specific information pulled into the AVS if appropriate.  Patient was instructed to call the [facility] with any questions, concerns, or worsening symptoms.    *Please note that portions of this note were completed with a voice recognition program. Efforts were made to edit the dictations, but occasionally words are mistranscribed.

## 2021-05-21 ENCOUNTER — HOSPITAL ENCOUNTER (OUTPATIENT)
Dept: INFUSION THERAPY | Facility: HOSPITAL | Age: 81
Setting detail: INFUSION SERIES
Discharge: HOME | End: 2021-05-21

## 2021-05-21 VITALS
TEMPERATURE: 98 F | RESPIRATION RATE: 18 BRPM | HEART RATE: 73 BPM | SYSTOLIC BLOOD PRESSURE: 98 MMHG | DIASTOLIC BLOOD PRESSURE: 48 MMHG

## 2021-05-21 DIAGNOSIS — L02.619 CELLULITIS AND ABSCESS OF FOOT: Primary | ICD-10-CM

## 2021-05-21 DIAGNOSIS — L03.119 CELLULITIS AND ABSCESS OF FOOT: Primary | ICD-10-CM

## 2021-05-21 LAB — CK SERPL-CCNC: 67 U/L (ref 20–200)

## 2021-05-21 PROCEDURE — 25010000002 CEFTRIAXONE PER 250 MG: Performed by: INTERNAL MEDICINE

## 2021-05-21 PROCEDURE — 96366 THER/PROPH/DIAG IV INF ADDON: CPT

## 2021-05-21 PROCEDURE — 25010000002 DAPTOMYCIN PER 1 MG: Performed by: INTERNAL MEDICINE

## 2021-05-21 PROCEDURE — 96365 THER/PROPH/DIAG IV INF INIT: CPT

## 2021-05-21 PROCEDURE — 82550 ASSAY OF CK (CPK): CPT | Performed by: INTERNAL MEDICINE

## 2021-05-21 PROCEDURE — 96367 TX/PROPH/DG ADDL SEQ IV INF: CPT

## 2021-05-21 RX ADMIN — DAPTOMYCIN 500 MG: 500 INJECTION, POWDER, LYOPHILIZED, FOR SOLUTION INTRAVENOUS at 10:24

## 2021-05-21 RX ADMIN — CEFTRIAXONE 1 G: 1 INJECTION, POWDER, FOR SOLUTION INTRAMUSCULAR; INTRAVENOUS at 09:37

## 2021-05-22 ENCOUNTER — HOSPITAL ENCOUNTER (OUTPATIENT)
Dept: INFUSION THERAPY | Facility: HOSPITAL | Age: 81
Setting detail: INFUSION SERIES
Discharge: HOME | End: 2021-05-22

## 2021-05-22 VITALS
TEMPERATURE: 97.7 F | SYSTOLIC BLOOD PRESSURE: 98 MMHG | RESPIRATION RATE: 20 BRPM | HEART RATE: 76 BPM | DIASTOLIC BLOOD PRESSURE: 46 MMHG

## 2021-05-22 DIAGNOSIS — L02.619 CELLULITIS AND ABSCESS OF FOOT: Primary | ICD-10-CM

## 2021-05-22 DIAGNOSIS — L03.119 CELLULITIS AND ABSCESS OF FOOT: Primary | ICD-10-CM

## 2021-05-22 PROCEDURE — 25010000002 CEFTRIAXONE PER 250 MG: Performed by: INTERNAL MEDICINE

## 2021-05-22 PROCEDURE — 96365 THER/PROPH/DIAG IV INF INIT: CPT

## 2021-05-22 PROCEDURE — 96367 TX/PROPH/DG ADDL SEQ IV INF: CPT

## 2021-05-22 PROCEDURE — 25010000002 DAPTOMYCIN PER 1 MG: Performed by: INTERNAL MEDICINE

## 2021-05-22 RX ADMIN — CEFTRIAXONE 1 G: 1 INJECTION, POWDER, FOR SOLUTION INTRAMUSCULAR; INTRAVENOUS at 09:23

## 2021-05-22 RX ADMIN — DAPTOMYCIN 500 MG: 500 INJECTION, POWDER, LYOPHILIZED, FOR SOLUTION INTRAVENOUS at 09:55

## 2021-05-22 RX ADMIN — SILVER SULFADIAZINE: 10 CREAM TOPICAL at 09:54

## 2021-05-23 ENCOUNTER — HOSPITAL ENCOUNTER (OUTPATIENT)
Dept: INFUSION THERAPY | Facility: HOSPITAL | Age: 81
Setting detail: INFUSION SERIES
Discharge: HOME | End: 2021-05-23

## 2021-05-23 VITALS
SYSTOLIC BLOOD PRESSURE: 120 MMHG | DIASTOLIC BLOOD PRESSURE: 41 MMHG | HEART RATE: 75 BPM | TEMPERATURE: 97.2 F | RESPIRATION RATE: 18 BRPM

## 2021-05-23 DIAGNOSIS — L02.619 CELLULITIS AND ABSCESS OF FOOT: Primary | ICD-10-CM

## 2021-05-23 DIAGNOSIS — L03.119 CELLULITIS AND ABSCESS OF FOOT: Primary | ICD-10-CM

## 2021-05-23 PROCEDURE — 25010000002 DAPTOMYCIN PER 1 MG: Performed by: INTERNAL MEDICINE

## 2021-05-23 PROCEDURE — 25010000002 CEFTRIAXONE PER 250 MG: Performed by: INTERNAL MEDICINE

## 2021-05-23 PROCEDURE — 96365 THER/PROPH/DIAG IV INF INIT: CPT

## 2021-05-23 PROCEDURE — 96367 TX/PROPH/DG ADDL SEQ IV INF: CPT

## 2021-05-23 RX ADMIN — SILVER SULFADIAZINE 1 APPLICATION: 10 CREAM TOPICAL at 09:57

## 2021-05-23 RX ADMIN — DAPTOMYCIN 500 MG: 500 INJECTION, POWDER, LYOPHILIZED, FOR SOLUTION INTRAVENOUS at 09:19

## 2021-05-23 RX ADMIN — CEFTRIAXONE 1 G: 1 INJECTION, POWDER, FOR SOLUTION INTRAMUSCULAR; INTRAVENOUS at 09:49

## 2021-05-24 ENCOUNTER — HOSPITAL ENCOUNTER (OUTPATIENT)
Dept: INFUSION THERAPY | Facility: HOSPITAL | Age: 81
Setting detail: INFUSION SERIES
Discharge: HOME | End: 2021-05-24

## 2021-05-24 VITALS
SYSTOLIC BLOOD PRESSURE: 105 MMHG | TEMPERATURE: 98.2 F | DIASTOLIC BLOOD PRESSURE: 49 MMHG | RESPIRATION RATE: 18 BRPM | HEART RATE: 75 BPM

## 2021-05-24 DIAGNOSIS — L02.619 CELLULITIS AND ABSCESS OF FOOT: Primary | ICD-10-CM

## 2021-05-24 DIAGNOSIS — L03.119 CELLULITIS AND ABSCESS OF FOOT: Primary | ICD-10-CM

## 2021-05-24 LAB
ANION GAP SERPL CALCULATED.3IONS-SCNC: 7.7 MMOL/L (ref 5–15)
BUN SERPL-MCNC: 23 MG/DL (ref 8–23)
BUN/CREAT SERPL: 19.3 (ref 7–25)
CALCIUM SPEC-SCNC: 8.5 MG/DL (ref 8.6–10.5)
CHLORIDE SERPL-SCNC: 102 MMOL/L (ref 98–107)
CO2 SERPL-SCNC: 28.3 MMOL/L (ref 22–29)
CREAT SERPL-MCNC: 1.19 MG/DL (ref 0.76–1.27)
CRP SERPL-MCNC: 0.44 MG/DL (ref 0–0.5)
GFR SERPL CREATININE-BSD FRML MDRD: 59 ML/MIN/1.73
GLUCOSE SERPL-MCNC: 119 MG/DL (ref 65–99)
POTASSIUM SERPL-SCNC: 3.9 MMOL/L (ref 3.5–5.2)
SODIUM SERPL-SCNC: 138 MMOL/L (ref 136–145)

## 2021-05-24 PROCEDURE — 96365 THER/PROPH/DIAG IV INF INIT: CPT

## 2021-05-24 PROCEDURE — 80048 BASIC METABOLIC PNL TOTAL CA: CPT | Performed by: INTERNAL MEDICINE

## 2021-05-24 PROCEDURE — 25010000002 DAPTOMYCIN PER 1 MG: Performed by: INTERNAL MEDICINE

## 2021-05-24 PROCEDURE — 25010000002 CEFTRIAXONE: Performed by: INTERNAL MEDICINE

## 2021-05-24 PROCEDURE — 96367 TX/PROPH/DG ADDL SEQ IV INF: CPT

## 2021-05-24 PROCEDURE — 86140 C-REACTIVE PROTEIN: CPT | Performed by: INTERNAL MEDICINE

## 2021-05-24 RX ADMIN — DAPTOMYCIN 500 MG: 500 INJECTION, POWDER, LYOPHILIZED, FOR SOLUTION INTRAVENOUS at 08:35

## 2021-05-24 RX ADMIN — CEFTRIAXONE 1 G: 1 INJECTION, POWDER, FOR SOLUTION INTRAMUSCULAR; INTRAVENOUS at 09:10

## 2021-05-28 ENCOUNTER — HOSPITAL ENCOUNTER (OUTPATIENT)
Dept: WOUND CARE | Facility: HOSPITAL | Age: 81
Discharge: HOME | End: 2021-05-28

## 2021-05-28 ENCOUNTER — HOSPITAL ENCOUNTER (OUTPATIENT)
Dept: INFUSION THERAPY | Facility: HOSPITAL | Age: 81
Setting detail: INFUSION SERIES
Discharge: HOME | End: 2021-05-28

## 2021-05-28 VITALS
DIASTOLIC BLOOD PRESSURE: 56 MMHG | TEMPERATURE: 98.6 F | RESPIRATION RATE: 18 BRPM | SYSTOLIC BLOOD PRESSURE: 99 MMHG | HEART RATE: 76 BPM

## 2021-05-28 DIAGNOSIS — L03.119 CELLULITIS AND ABSCESS OF FOOT: Primary | ICD-10-CM

## 2021-05-28 DIAGNOSIS — L02.619 CELLULITIS AND ABSCESS OF FOOT: Primary | ICD-10-CM

## 2021-05-28 DIAGNOSIS — T25.221A PARTIAL THICKNESS BURN OF RIGHT FOOT, INITIAL ENCOUNTER: Primary | ICD-10-CM

## 2021-05-28 LAB
ANION GAP SERPL CALCULATED.3IONS-SCNC: 8.4 MMOL/L (ref 5–15)
BASOPHILS # BLD AUTO: 0.04 10*3/MM3 (ref 0–0.2)
BASOPHILS NFR BLD AUTO: 0.8 % (ref 0–1.5)
BUN SERPL-MCNC: 28 MG/DL (ref 8–23)
BUN/CREAT SERPL: 22 (ref 7–25)
CALCIUM SPEC-SCNC: 8.5 MG/DL (ref 8.6–10.5)
CHLORIDE SERPL-SCNC: 99 MMOL/L (ref 98–107)
CO2 SERPL-SCNC: 31.6 MMOL/L (ref 22–29)
CREAT SERPL-MCNC: 1.27 MG/DL (ref 0.76–1.27)
CRP SERPL-MCNC: 0.41 MG/DL (ref 0–0.5)
DEPRECATED RDW RBC AUTO: 57.9 FL (ref 37–54)
EOSINOPHIL # BLD AUTO: 0.28 10*3/MM3 (ref 0–0.4)
EOSINOPHIL NFR BLD AUTO: 5.3 % (ref 0.3–6.2)
ERYTHROCYTE [DISTWIDTH] IN BLOOD BY AUTOMATED COUNT: 17.6 % (ref 12.3–15.4)
GFR SERPL CREATININE-BSD FRML MDRD: 55 ML/MIN/1.73
GLUCOSE SERPL-MCNC: 100 MG/DL (ref 65–99)
HCT VFR BLD AUTO: 37.3 % (ref 37.5–51)
HGB BLD-MCNC: 11.7 G/DL (ref 13–17.7)
IMM GRANULOCYTES # BLD AUTO: 0.01 10*3/MM3 (ref 0–0.05)
IMM GRANULOCYTES NFR BLD AUTO: 0.2 % (ref 0–0.5)
LYMPHOCYTES # BLD AUTO: 0.76 10*3/MM3 (ref 0.7–3.1)
LYMPHOCYTES NFR BLD AUTO: 14.5 % (ref 19.6–45.3)
MCH RBC QN AUTO: 28.2 PG (ref 26.6–33)
MCHC RBC AUTO-ENTMCNC: 31.4 G/DL (ref 31.5–35.7)
MCV RBC AUTO: 89.9 FL (ref 79–97)
MONOCYTES # BLD AUTO: 0.53 10*3/MM3 (ref 0.1–0.9)
MONOCYTES NFR BLD AUTO: 10.1 % (ref 5–12)
NEUTROPHILS NFR BLD AUTO: 3.62 10*3/MM3 (ref 1.7–7)
NEUTROPHILS NFR BLD AUTO: 69.1 % (ref 42.7–76)
NRBC BLD AUTO-RTO: 0 /100 WBC (ref 0–0.2)
PLATELET # BLD AUTO: 145 10*3/MM3 (ref 140–450)
PMV BLD AUTO: 10.4 FL (ref 6–12)
POTASSIUM SERPL-SCNC: 3.4 MMOL/L (ref 3.5–5.2)
RBC # BLD AUTO: 4.15 10*6/MM3 (ref 4.14–5.8)
SODIUM SERPL-SCNC: 139 MMOL/L (ref 136–145)
WBC # BLD AUTO: 5.24 10*3/MM3 (ref 3.4–10.8)

## 2021-05-28 PROCEDURE — 25010000002 DAPTOMYCIN PER 1 MG: Performed by: INTERNAL MEDICINE

## 2021-05-28 PROCEDURE — 80048 BASIC METABOLIC PNL TOTAL CA: CPT | Performed by: INTERNAL MEDICINE

## 2021-05-28 PROCEDURE — 96367 TX/PROPH/DG ADDL SEQ IV INF: CPT

## 2021-05-28 PROCEDURE — 25010000002 LEVOFLOXACIN PER 250 MG: Performed by: INTERNAL MEDICINE

## 2021-05-28 PROCEDURE — 86140 C-REACTIVE PROTEIN: CPT | Performed by: INTERNAL MEDICINE

## 2021-05-28 PROCEDURE — 85025 COMPLETE CBC W/AUTO DIFF WBC: CPT | Performed by: INTERNAL MEDICINE

## 2021-05-28 PROCEDURE — 96365 THER/PROPH/DIAG IV INF INIT: CPT

## 2021-05-28 RX ORDER — LEVOFLOXACIN 5 MG/ML
500 INJECTION, SOLUTION INTRAVENOUS ONCE
Status: CANCELLED
Start: 2021-05-29 | End: 2021-05-29

## 2021-05-28 RX ORDER — LEVOFLOXACIN 5 MG/ML
500 INJECTION, SOLUTION INTRAVENOUS ONCE
Status: COMPLETED | OUTPATIENT
Start: 2021-05-28 | End: 2021-05-28

## 2021-05-28 RX ORDER — LIDOCAINE HYDROCHLORIDE 20 MG/ML
JELLY TOPICAL ONCE
Status: DISCONTINUED | OUTPATIENT
Start: 2021-05-28 | End: 2021-05-29 | Stop reason: HOSPADM

## 2021-05-28 RX ADMIN — LEVOFLOXACIN 500 MG: 5 INJECTION, SOLUTION INTRAVENOUS at 14:02

## 2021-05-28 RX ADMIN — DAPTOMYCIN 500 MG: 500 INJECTION, POWDER, LYOPHILIZED, FOR SOLUTION INTRAVENOUS at 15:04
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
## 2021-05-29 ENCOUNTER — HOSPITAL ENCOUNTER (OUTPATIENT)
Dept: INFUSION THERAPY | Facility: HOSPITAL | Age: 81
Setting detail: INFUSION SERIES
Discharge: HOME | End: 2021-05-29

## 2021-05-29 VITALS
DIASTOLIC BLOOD PRESSURE: 56 MMHG | HEART RATE: 75 BPM | SYSTOLIC BLOOD PRESSURE: 126 MMHG | RESPIRATION RATE: 18 BRPM | TEMPERATURE: 97.1 F

## 2021-05-29 DIAGNOSIS — L03.119 CELLULITIS AND ABSCESS OF FOOT: Primary | ICD-10-CM

## 2021-05-29 DIAGNOSIS — L02.619 CELLULITIS AND ABSCESS OF FOOT: Primary | ICD-10-CM

## 2021-05-29 PROCEDURE — 25010000002 DAPTOMYCIN PER 1 MG: Performed by: INTERNAL MEDICINE

## 2021-05-29 PROCEDURE — 96365 THER/PROPH/DIAG IV INF INIT: CPT

## 2021-05-29 PROCEDURE — 25010000002 LEVOFLOXACIN PER 250 MG: Performed by: INTERNAL MEDICINE

## 2021-05-29 PROCEDURE — 96367 TX/PROPH/DG ADDL SEQ IV INF: CPT

## 2021-05-29 RX ORDER — LEVOFLOXACIN 5 MG/ML
500 INJECTION, SOLUTION INTRAVENOUS ONCE
Status: CANCELLED
Start: 2021-05-30 | End: 2021-05-30

## 2021-05-29 RX ORDER — LEVOFLOXACIN 5 MG/ML
500 INJECTION, SOLUTION INTRAVENOUS ONCE
Status: COMPLETED | OUTPATIENT
Start: 2021-05-29 | End: 2021-05-29

## 2021-05-29 RX ADMIN — DAPTOMYCIN 500 MG: 500 INJECTION, POWDER, LYOPHILIZED, FOR SOLUTION INTRAVENOUS at 10:25

## 2021-05-29 RX ADMIN — LEVOFLOXACIN 500 MG: 5 INJECTION, SOLUTION INTRAVENOUS at 09:26

## 2021-05-30 ENCOUNTER — HOSPITAL ENCOUNTER (OUTPATIENT)
Dept: INFUSION THERAPY | Facility: HOSPITAL | Age: 81
Setting detail: INFUSION SERIES
Discharge: HOME | End: 2021-05-30

## 2021-05-30 VITALS
TEMPERATURE: 98.2 F | DIASTOLIC BLOOD PRESSURE: 62 MMHG | RESPIRATION RATE: 18 BRPM | SYSTOLIC BLOOD PRESSURE: 122 MMHG | HEART RATE: 75 BPM

## 2021-05-30 DIAGNOSIS — L03.119 CELLULITIS AND ABSCESS OF FOOT: Primary | ICD-10-CM

## 2021-05-30 DIAGNOSIS — L02.619 CELLULITIS AND ABSCESS OF FOOT: Primary | ICD-10-CM

## 2021-05-30 PROCEDURE — 96365 THER/PROPH/DIAG IV INF INIT: CPT

## 2021-05-30 PROCEDURE — 96367 TX/PROPH/DG ADDL SEQ IV INF: CPT

## 2021-05-30 PROCEDURE — 25010000002 DAPTOMYCIN PER 1 MG: Performed by: INTERNAL MEDICINE

## 2021-05-30 PROCEDURE — 25010000002 LEVOFLOXACIN PER 250 MG: Performed by: INTERNAL MEDICINE

## 2021-05-30 RX ORDER — LEVOFLOXACIN 5 MG/ML
500 INJECTION, SOLUTION INTRAVENOUS ONCE
Status: CANCELLED
Start: 2021-05-31 | End: 2021-05-31

## 2021-05-30 RX ORDER — LEVOFLOXACIN 5 MG/ML
500 INJECTION, SOLUTION INTRAVENOUS ONCE
Status: COMPLETED | OUTPATIENT
Start: 2021-05-30 | End: 2021-05-30

## 2021-05-30 RX ADMIN — LEVOFLOXACIN 500 MG: 5 INJECTION, SOLUTION INTRAVENOUS at 09:10

## 2021-05-30 RX ADMIN — DAPTOMYCIN 500 MG: 500 INJECTION, POWDER, LYOPHILIZED, FOR SOLUTION INTRAVENOUS at 10:09

## 2021-05-31 ENCOUNTER — HOSPITAL ENCOUNTER (OUTPATIENT)
Dept: INFUSION THERAPY | Facility: HOSPITAL | Age: 81
Setting detail: INFUSION SERIES
Discharge: HOME | End: 2021-05-31

## 2021-05-31 VITALS
DIASTOLIC BLOOD PRESSURE: 59 MMHG | TEMPERATURE: 98.6 F | RESPIRATION RATE: 18 BRPM | SYSTOLIC BLOOD PRESSURE: 118 MMHG | HEART RATE: 77 BPM

## 2021-05-31 DIAGNOSIS — L02.619 CELLULITIS AND ABSCESS OF FOOT: Primary | ICD-10-CM

## 2021-05-31 DIAGNOSIS — L03.119 CELLULITIS AND ABSCESS OF FOOT: Primary | ICD-10-CM

## 2021-05-31 LAB
ANION GAP SERPL CALCULATED.3IONS-SCNC: 9.1 MMOL/L (ref 5–15)
BASOPHILS # BLD AUTO: 0.04 10*3/MM3 (ref 0–0.2)
BASOPHILS NFR BLD AUTO: 0.7 % (ref 0–1.5)
BUN SERPL-MCNC: 31 MG/DL (ref 8–23)
BUN/CREAT SERPL: 21.1 (ref 7–25)
CALCIUM SPEC-SCNC: 8.6 MG/DL (ref 8.6–10.5)
CHLORIDE SERPL-SCNC: 101 MMOL/L (ref 98–107)
CO2 SERPL-SCNC: 27.9 MMOL/L (ref 22–29)
CREAT SERPL-MCNC: 1.47 MG/DL (ref 0.76–1.27)
CRP SERPL-MCNC: 0.94 MG/DL (ref 0–0.5)
DEPRECATED RDW RBC AUTO: 62 FL (ref 37–54)
EOSINOPHIL # BLD AUTO: 0.27 10*3/MM3 (ref 0–0.4)
EOSINOPHIL NFR BLD AUTO: 4.9 % (ref 0.3–6.2)
ERYTHROCYTE [DISTWIDTH] IN BLOOD BY AUTOMATED COUNT: 18 % (ref 12.3–15.4)
GFR SERPL CREATININE-BSD FRML MDRD: 46 ML/MIN/1.73
GLUCOSE SERPL-MCNC: 126 MG/DL (ref 65–99)
HCT VFR BLD AUTO: 40.6 % (ref 37.5–51)
HGB BLD-MCNC: 12.4 G/DL (ref 13–17.7)
IMM GRANULOCYTES # BLD AUTO: 0.02 10*3/MM3 (ref 0–0.05)
IMM GRANULOCYTES NFR BLD AUTO: 0.4 % (ref 0–0.5)
LYMPHOCYTES # BLD AUTO: 0.59 10*3/MM3 (ref 0.7–3.1)
LYMPHOCYTES NFR BLD AUTO: 10.7 % (ref 19.6–45.3)
MCH RBC QN AUTO: 28.6 PG (ref 26.6–33)
MCHC RBC AUTO-ENTMCNC: 30.5 G/DL (ref 31.5–35.7)
MCV RBC AUTO: 93.8 FL (ref 79–97)
MONOCYTES # BLD AUTO: 0.52 10*3/MM3 (ref 0.1–0.9)
MONOCYTES NFR BLD AUTO: 9.5 % (ref 5–12)
NEUTROPHILS NFR BLD AUTO: 4.06 10*3/MM3 (ref 1.7–7)
NEUTROPHILS NFR BLD AUTO: 73.8 % (ref 42.7–76)
NRBC BLD AUTO-RTO: 0 /100 WBC (ref 0–0.2)
PLATELET # BLD AUTO: 143 10*3/MM3 (ref 140–450)
PMV BLD AUTO: 11.8 FL (ref 6–12)
POTASSIUM SERPL-SCNC: 4 MMOL/L (ref 3.5–5.2)
RBC # BLD AUTO: 4.33 10*6/MM3 (ref 4.14–5.8)
SODIUM SERPL-SCNC: 138 MMOL/L (ref 136–145)
WBC # BLD AUTO: 5.5 10*3/MM3 (ref 3.4–10.8)

## 2021-05-31 PROCEDURE — 96365 THER/PROPH/DIAG IV INF INIT: CPT

## 2021-05-31 PROCEDURE — 85025 COMPLETE CBC W/AUTO DIFF WBC: CPT | Performed by: INTERNAL MEDICINE

## 2021-05-31 PROCEDURE — 96367 TX/PROPH/DG ADDL SEQ IV INF: CPT

## 2021-05-31 PROCEDURE — 86140 C-REACTIVE PROTEIN: CPT | Performed by: INTERNAL MEDICINE

## 2021-05-31 PROCEDURE — 25010000002 LEVOFLOXACIN PER 250 MG: Performed by: INTERNAL MEDICINE

## 2021-05-31 PROCEDURE — 25010000002 DAPTOMYCIN PER 1 MG: Performed by: INTERNAL MEDICINE

## 2021-05-31 PROCEDURE — 80048 BASIC METABOLIC PNL TOTAL CA: CPT | Performed by: INTERNAL MEDICINE

## 2021-05-31 RX ORDER — LEVOFLOXACIN 5 MG/ML
500 INJECTION, SOLUTION INTRAVENOUS ONCE
Status: CANCELLED
Start: 2021-06-01 | End: 2021-06-01

## 2021-05-31 RX ORDER — LEVOFLOXACIN 5 MG/ML
500 INJECTION, SOLUTION INTRAVENOUS ONCE
Status: COMPLETED | OUTPATIENT
Start: 2021-05-31 | End: 2021-05-31

## 2021-05-31 RX ADMIN — LEVOFLOXACIN 500 MG: 5 INJECTION, SOLUTION INTRAVENOUS at 09:00

## 2021-05-31 RX ADMIN — DAPTOMYCIN 500 MG: 500 INJECTION, POWDER, LYOPHILIZED, FOR SOLUTION INTRAVENOUS at 09:59

## 2021-06-01 ENCOUNTER — HOSPITAL ENCOUNTER (OUTPATIENT)
Dept: INFUSION THERAPY | Facility: HOSPITAL | Age: 81
Setting detail: INFUSION SERIES
Discharge: HOME | End: 2021-06-01

## 2021-06-01 VITALS
DIASTOLIC BLOOD PRESSURE: 51 MMHG | RESPIRATION RATE: 18 BRPM | SYSTOLIC BLOOD PRESSURE: 99 MMHG | HEART RATE: 75 BPM | TEMPERATURE: 98.2 F

## 2021-06-01 DIAGNOSIS — L02.619 CELLULITIS AND ABSCESS OF FOOT: Primary | ICD-10-CM

## 2021-06-01 DIAGNOSIS — L03.119 CELLULITIS AND ABSCESS OF FOOT: Primary | ICD-10-CM

## 2021-06-01 PROCEDURE — 96365 THER/PROPH/DIAG IV INF INIT: CPT

## 2021-06-01 PROCEDURE — 96367 TX/PROPH/DG ADDL SEQ IV INF: CPT

## 2021-06-01 PROCEDURE — 25010000002 DAPTOMYCIN PER 1 MG: Performed by: INTERNAL MEDICINE

## 2021-06-01 PROCEDURE — 25010000002 LEVOFLOXACIN PER 250 MG: Performed by: INTERNAL MEDICINE

## 2021-06-01 RX ORDER — LEVOFLOXACIN 5 MG/ML
250 INJECTION, SOLUTION INTRAVENOUS ONCE
Status: COMPLETED | OUTPATIENT
Start: 2021-06-01 | End: 2021-06-01

## 2021-06-01 RX ORDER — LEVOFLOXACIN 5 MG/ML
250 INJECTION, SOLUTION INTRAVENOUS ONCE
Status: CANCELLED
Start: 2021-06-02 | End: 2021-06-02

## 2021-06-01 RX ADMIN — LEVOFLOXACIN 250 MG: 5 INJECTION, SOLUTION INTRAVENOUS at 10:29

## 2021-06-01 RX ADMIN — DAPTOMYCIN 500 MG: 500 INJECTION, POWDER, LYOPHILIZED, FOR SOLUTION INTRAVENOUS at 09:55

## 2021-06-02 ENCOUNTER — OFFICE VISIT (OUTPATIENT)
Dept: CARDIAC SURGERY | Facility: CLINIC | Age: 81
End: 2021-06-02

## 2021-06-02 ENCOUNTER — HOSPITAL ENCOUNTER (OUTPATIENT)
Dept: INFUSION THERAPY | Facility: HOSPITAL | Age: 81
Setting detail: INFUSION SERIES
Discharge: HOME | End: 2021-06-02

## 2021-06-02 VITALS
DIASTOLIC BLOOD PRESSURE: 68 MMHG | WEIGHT: 195 LBS | HEIGHT: 70 IN | HEART RATE: 74 BPM | BODY MASS INDEX: 27.92 KG/M2 | OXYGEN SATURATION: 99 % | SYSTOLIC BLOOD PRESSURE: 123 MMHG | TEMPERATURE: 97.5 F

## 2021-06-02 VITALS
HEART RATE: 74 BPM | SYSTOLIC BLOOD PRESSURE: 111 MMHG | DIASTOLIC BLOOD PRESSURE: 54 MMHG | RESPIRATION RATE: 16 BRPM | TEMPERATURE: 97.3 F

## 2021-06-02 DIAGNOSIS — L02.619 CELLULITIS AND ABSCESS OF FOOT: Primary | ICD-10-CM

## 2021-06-02 DIAGNOSIS — L03.119 CELLULITIS AND ABSCESS OF FOOT: Primary | ICD-10-CM

## 2021-06-02 DIAGNOSIS — J90 CHRONIC PLEURAL EFFUSION: Primary | ICD-10-CM

## 2021-06-02 DIAGNOSIS — I50.22 CHRONIC SYSTOLIC CONGESTIVE HEART FAILURE (HCC): ICD-10-CM

## 2021-06-02 LAB
ANION GAP SERPL CALCULATED.3IONS-SCNC: 8.9 MMOL/L (ref 5–15)
BASOPHILS # BLD AUTO: 0.06 10*3/MM3 (ref 0–0.2)
BASOPHILS NFR BLD AUTO: 1.1 % (ref 0–1.5)
BUN SERPL-MCNC: 32 MG/DL (ref 8–23)
BUN/CREAT SERPL: 20.9 (ref 7–25)
CALCIUM SPEC-SCNC: 8.8 MG/DL (ref 8.6–10.5)
CHLORIDE SERPL-SCNC: 101 MMOL/L (ref 98–107)
CO2 SERPL-SCNC: 29.1 MMOL/L (ref 22–29)
CREAT SERPL-MCNC: 1.53 MG/DL (ref 0.76–1.27)
CRP SERPL-MCNC: 0.83 MG/DL (ref 0–0.5)
DEPRECATED RDW RBC AUTO: 58.9 FL (ref 37–54)
EOSINOPHIL # BLD AUTO: 0.23 10*3/MM3 (ref 0–0.4)
EOSINOPHIL NFR BLD AUTO: 4 % (ref 0.3–6.2)
ERYTHROCYTE [DISTWIDTH] IN BLOOD BY AUTOMATED COUNT: 17.8 % (ref 12.3–15.4)
GFR SERPL CREATININE-BSD FRML MDRD: 44 ML/MIN/1.73
GLUCOSE SERPL-MCNC: 103 MG/DL (ref 65–99)
HCT VFR BLD AUTO: 37.8 % (ref 37.5–51)
HGB BLD-MCNC: 11.9 G/DL (ref 13–17.7)
IMM GRANULOCYTES # BLD AUTO: 0.02 10*3/MM3 (ref 0–0.05)
IMM GRANULOCYTES NFR BLD AUTO: 0.4 % (ref 0–0.5)
LYMPHOCYTES # BLD AUTO: 0.71 10*3/MM3 (ref 0.7–3.1)
LYMPHOCYTES NFR BLD AUTO: 12.5 % (ref 19.6–45.3)
MCH RBC QN AUTO: 28.5 PG (ref 26.6–33)
MCHC RBC AUTO-ENTMCNC: 31.5 G/DL (ref 31.5–35.7)
MCV RBC AUTO: 90.6 FL (ref 79–97)
MONOCYTES # BLD AUTO: 0.55 10*3/MM3 (ref 0.1–0.9)
MONOCYTES NFR BLD AUTO: 9.7 % (ref 5–12)
NEUTROPHILS NFR BLD AUTO: 4.12 10*3/MM3 (ref 1.7–7)
NEUTROPHILS NFR BLD AUTO: 72.3 % (ref 42.7–76)
NRBC BLD AUTO-RTO: 0 /100 WBC (ref 0–0.2)
PLATELET # BLD AUTO: 156 10*3/MM3 (ref 140–450)
PMV BLD AUTO: 11.8 FL (ref 6–12)
POTASSIUM SERPL-SCNC: 3.7 MMOL/L (ref 3.5–5.2)
RBC # BLD AUTO: 4.17 10*6/MM3 (ref 4.14–5.8)
SODIUM SERPL-SCNC: 139 MMOL/L (ref 136–145)
WBC # BLD AUTO: 5.69 10*3/MM3 (ref 3.4–10.8)

## 2021-06-02 PROCEDURE — 96367 TX/PROPH/DG ADDL SEQ IV INF: CPT

## 2021-06-02 PROCEDURE — 25010000002 LEVOFLOXACIN PER 250 MG: Performed by: INTERNAL MEDICINE

## 2021-06-02 PROCEDURE — 96365 THER/PROPH/DIAG IV INF INIT: CPT

## 2021-06-02 PROCEDURE — 80048 BASIC METABOLIC PNL TOTAL CA: CPT | Performed by: INTERNAL MEDICINE

## 2021-06-02 PROCEDURE — 25010000002 DAPTOMYCIN PER 1 MG: Performed by: INTERNAL MEDICINE

## 2021-06-02 PROCEDURE — 99203 OFFICE O/P NEW LOW 30 MIN: CPT | Performed by: THORACIC SURGERY (CARDIOTHORACIC VASCULAR SURGERY)

## 2021-06-02 PROCEDURE — 85025 COMPLETE CBC W/AUTO DIFF WBC: CPT | Performed by: INTERNAL MEDICINE

## 2021-06-02 PROCEDURE — 86140 C-REACTIVE PROTEIN: CPT | Performed by: INTERNAL MEDICINE

## 2021-06-02 RX ORDER — LEVOFLOXACIN 5 MG/ML
250 INJECTION, SOLUTION INTRAVENOUS ONCE
Status: CANCELLED
Start: 2021-06-03 | End: 2021-06-03

## 2021-06-02 RX ORDER — LEVOFLOXACIN 5 MG/ML
250 INJECTION, SOLUTION INTRAVENOUS ONCE
Status: COMPLETED | OUTPATIENT
Start: 2021-06-02 | End: 2021-06-02

## 2021-06-02 RX ADMIN — DAPTOMYCIN 500 MG: 500 INJECTION, POWDER, LYOPHILIZED, FOR SOLUTION INTRAVENOUS at 15:01

## 2021-06-02 RX ADMIN — LEVOFLOXACIN 250 MG: 5 INJECTION, SOLUTION INTRAVENOUS at 14:06
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
## 2021-06-03 ENCOUNTER — HOSPITAL ENCOUNTER (OUTPATIENT)
Dept: INFUSION THERAPY | Facility: HOSPITAL | Age: 81
Setting detail: INFUSION SERIES
Discharge: HOME | End: 2021-06-03

## 2021-06-03 VITALS
TEMPERATURE: 97.7 F | RESPIRATION RATE: 18 BRPM | SYSTOLIC BLOOD PRESSURE: 99 MMHG | HEART RATE: 75 BPM | DIASTOLIC BLOOD PRESSURE: 48 MMHG

## 2021-06-03 DIAGNOSIS — L03.119 CELLULITIS AND ABSCESS OF FOOT: Primary | ICD-10-CM

## 2021-06-03 DIAGNOSIS — L02.619 CELLULITIS AND ABSCESS OF FOOT: Primary | ICD-10-CM

## 2021-06-03 PROCEDURE — 96367 TX/PROPH/DG ADDL SEQ IV INF: CPT

## 2021-06-03 PROCEDURE — 25010000002 DAPTOMYCIN PER 1 MG: Performed by: INTERNAL MEDICINE

## 2021-06-03 PROCEDURE — 96365 THER/PROPH/DIAG IV INF INIT: CPT

## 2021-06-03 PROCEDURE — 25010000002 LEVOFLOXACIN PER 250 MG: Performed by: INTERNAL MEDICINE

## 2021-06-03 RX ORDER — LEVOFLOXACIN 5 MG/ML
250 INJECTION, SOLUTION INTRAVENOUS ONCE
Status: COMPLETED | OUTPATIENT
Start: 2021-06-03 | End: 2021-06-03

## 2021-06-03 RX ORDER — LEVOFLOXACIN 5 MG/ML
250 INJECTION, SOLUTION INTRAVENOUS ONCE
Status: CANCELLED
Start: 2021-06-03 | End: 2021-06-03

## 2021-06-03 RX ADMIN — LEVOFLOXACIN 250 MG: 5 INJECTION, SOLUTION INTRAVENOUS at 09:30

## 2021-06-03 RX ADMIN — DAPTOMYCIN 500 MG: 500 INJECTION, POWDER, LYOPHILIZED, FOR SOLUTION INTRAVENOUS at 08:53

## 2021-06-04 ENCOUNTER — HOSPITAL ENCOUNTER (OUTPATIENT)
Dept: INFUSION THERAPY | Facility: HOSPITAL | Age: 81
Setting detail: INFUSION SERIES
Discharge: HOME | End: 2021-06-04

## 2021-06-04 ENCOUNTER — HOSPITAL ENCOUNTER (OUTPATIENT)
Dept: WOUND CARE | Facility: HOSPITAL | Age: 81
Discharge: HOME | End: 2021-06-04

## 2021-06-04 VITALS
DIASTOLIC BLOOD PRESSURE: 65 MMHG | RESPIRATION RATE: 16 BRPM | HEART RATE: 75 BPM | SYSTOLIC BLOOD PRESSURE: 122 MMHG | TEMPERATURE: 98.4 F

## 2021-06-04 DIAGNOSIS — L03.119 CELLULITIS AND ABSCESS OF FOOT: Primary | ICD-10-CM

## 2021-06-04 DIAGNOSIS — T25.221A PARTIAL THICKNESS BURN OF RIGHT FOOT, INITIAL ENCOUNTER: ICD-10-CM

## 2021-06-04 DIAGNOSIS — L02.619 CELLULITIS AND ABSCESS OF FOOT: Primary | ICD-10-CM

## 2021-06-04 PROBLEM — J90 CHRONIC PLEURAL EFFUSION: Status: ACTIVE | Noted: 2021-06-04

## 2021-06-04 LAB
ANION GAP SERPL CALCULATED.3IONS-SCNC: 9.8 MMOL/L (ref 5–15)
BUN SERPL-MCNC: 31 MG/DL (ref 8–23)
BUN/CREAT SERPL: 20.8 (ref 7–25)
CALCIUM SPEC-SCNC: 8.5 MG/DL (ref 8.6–10.5)
CHLORIDE SERPL-SCNC: 102 MMOL/L (ref 98–107)
CO2 SERPL-SCNC: 24.2 MMOL/L (ref 22–29)
CREAT SERPL-MCNC: 1.49 MG/DL (ref 0.76–1.27)
CRP SERPL-MCNC: 0.65 MG/DL (ref 0–0.5)
GFR SERPL CREATININE-BSD FRML MDRD: 45 ML/MIN/1.73
GLUCOSE SERPL-MCNC: 149 MG/DL (ref 65–99)
POTASSIUM SERPL-SCNC: 3.9 MMOL/L (ref 3.5–5.2)
SODIUM SERPL-SCNC: 136 MMOL/L (ref 136–145)

## 2021-06-04 PROCEDURE — 96375 TX/PRO/DX INJ NEW DRUG ADDON: CPT

## 2021-06-04 PROCEDURE — 25010000002 LEVOFLOXACIN PER 250 MG: Performed by: INTERNAL MEDICINE

## 2021-06-04 PROCEDURE — 96365 THER/PROPH/DIAG IV INF INIT: CPT

## 2021-06-04 PROCEDURE — 96374 THER/PROPH/DIAG INJ IV PUSH: CPT

## 2021-06-04 PROCEDURE — 25010000002 FUROSEMIDE PER 20 MG: Performed by: INTERNAL MEDICINE

## 2021-06-04 PROCEDURE — 80048 BASIC METABOLIC PNL TOTAL CA: CPT | Performed by: INTERNAL MEDICINE

## 2021-06-04 PROCEDURE — 86140 C-REACTIVE PROTEIN: CPT | Performed by: INTERNAL MEDICINE

## 2021-06-04 RX ORDER — LEVOFLOXACIN 5 MG/ML
250 INJECTION, SOLUTION INTRAVENOUS ONCE
Status: CANCELLED
Start: 2021-06-05 | End: 2021-06-05

## 2021-06-04 RX ORDER — LEVOFLOXACIN 5 MG/ML
250 INJECTION, SOLUTION INTRAVENOUS ONCE
Status: COMPLETED | OUTPATIENT
Start: 2021-06-04 | End: 2021-06-04

## 2021-06-04 RX ORDER — FUROSEMIDE 10 MG/ML
40 INJECTION INTRAMUSCULAR; INTRAVENOUS ONCE
Status: COMPLETED | OUTPATIENT
Start: 2021-06-04 | End: 2021-06-04

## 2021-06-04 RX ADMIN — LEVOFLOXACIN 250 MG: 5 INJECTION, SOLUTION INTRAVENOUS at 13:35

## 2021-06-04 RX ADMIN — FUROSEMIDE 40 MG: 10 INJECTION, SOLUTION INTRAMUSCULAR; INTRAVENOUS at 14:24

## 2021-06-05 ENCOUNTER — HOSPITAL ENCOUNTER (OUTPATIENT)
Dept: INFUSION THERAPY | Facility: HOSPITAL | Age: 81
Setting detail: INFUSION SERIES
Discharge: HOME | End: 2021-06-05

## 2021-06-05 VITALS
RESPIRATION RATE: 16 BRPM | SYSTOLIC BLOOD PRESSURE: 145 MMHG | HEART RATE: 75 BPM | DIASTOLIC BLOOD PRESSURE: 66 MMHG | TEMPERATURE: 97.9 F

## 2021-06-05 DIAGNOSIS — L03.119 CELLULITIS AND ABSCESS OF FOOT: Primary | ICD-10-CM

## 2021-06-05 DIAGNOSIS — L02.619 CELLULITIS AND ABSCESS OF FOOT: Primary | ICD-10-CM

## 2021-06-05 PROCEDURE — 96365 THER/PROPH/DIAG IV INF INIT: CPT

## 2021-06-05 PROCEDURE — 25010000002 LEVOFLOXACIN PER 250 MG: Performed by: INTERNAL MEDICINE

## 2021-06-05 RX ORDER — LEVOFLOXACIN 5 MG/ML
250 INJECTION, SOLUTION INTRAVENOUS ONCE
Status: COMPLETED | OUTPATIENT
Start: 2021-06-05 | End: 2021-06-05

## 2021-06-05 RX ORDER — LEVOFLOXACIN 5 MG/ML
250 INJECTION, SOLUTION INTRAVENOUS ONCE
Status: CANCELLED
Start: 2021-06-06 | End: 2021-06-06

## 2021-06-05 RX ADMIN — LEVOFLOXACIN 250 MG: 5 INJECTION, SOLUTION INTRAVENOUS at 09:05
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
## 2021-06-06 ENCOUNTER — HOSPITAL ENCOUNTER (OUTPATIENT)
Dept: INFUSION THERAPY | Facility: HOSPITAL | Age: 81
Setting detail: INFUSION SERIES
Discharge: HOME | End: 2021-06-06

## 2021-06-06 DIAGNOSIS — L03.119 CELLULITIS AND ABSCESS OF FOOT: Primary | ICD-10-CM

## 2021-06-06 DIAGNOSIS — L02.619 CELLULITIS AND ABSCESS OF FOOT: Primary | ICD-10-CM

## 2021-06-06 LAB
ANION GAP SERPL CALCULATED.3IONS-SCNC: 11.1 MMOL/L (ref 5–15)
BUN SERPL-MCNC: 29 MG/DL (ref 8–23)
BUN/CREAT SERPL: 22 (ref 7–25)
CALCIUM SPEC-SCNC: 8.6 MG/DL (ref 8.6–10.5)
CHLORIDE SERPL-SCNC: 101 MMOL/L (ref 98–107)
CO2 SERPL-SCNC: 27.9 MMOL/L (ref 22–29)
CREAT SERPL-MCNC: 1.32 MG/DL (ref 0.76–1.27)
CRP SERPL-MCNC: 0.47 MG/DL (ref 0–0.5)
GFR SERPL CREATININE-BSD FRML MDRD: 52 ML/MIN/1.73
GLUCOSE SERPL-MCNC: 121 MG/DL (ref 65–99)
POTASSIUM SERPL-SCNC: 3 MMOL/L (ref 3.5–5.2)
SODIUM SERPL-SCNC: 140 MMOL/L (ref 136–145)

## 2021-06-06 PROCEDURE — 96365 THER/PROPH/DIAG IV INF INIT: CPT

## 2021-06-06 PROCEDURE — 25010000002 LEVOFLOXACIN PER 250 MG: Performed by: INTERNAL MEDICINE

## 2021-06-06 PROCEDURE — 80048 BASIC METABOLIC PNL TOTAL CA: CPT | Performed by: INTERNAL MEDICINE

## 2021-06-06 PROCEDURE — 86140 C-REACTIVE PROTEIN: CPT | Performed by: INTERNAL MEDICINE

## 2021-06-06 RX ORDER — LEVOFLOXACIN 5 MG/ML
250 INJECTION, SOLUTION INTRAVENOUS ONCE
Status: CANCELLED
Start: 2021-06-07 | End: 2021-06-07

## 2021-06-06 RX ORDER — LEVOFLOXACIN 5 MG/ML
250 INJECTION, SOLUTION INTRAVENOUS ONCE
Status: COMPLETED | OUTPATIENT
Start: 2021-06-06 | End: 2021-06-06

## 2021-06-06 RX ADMIN — LEVOFLOXACIN 250 MG: 5 INJECTION, SOLUTION INTRAVENOUS at 09:15

## 2021-06-07 ENCOUNTER — HOSPITAL ENCOUNTER (OUTPATIENT)
Dept: INFUSION THERAPY | Facility: HOSPITAL | Age: 81
Setting detail: INFUSION SERIES
Discharge: HOME | End: 2021-06-07

## 2021-06-07 VITALS
DIASTOLIC BLOOD PRESSURE: 70 MMHG | RESPIRATION RATE: 17 BRPM | HEART RATE: 74 BPM | SYSTOLIC BLOOD PRESSURE: 139 MMHG | TEMPERATURE: 97.9 F

## 2021-06-07 DIAGNOSIS — L03.119 CELLULITIS AND ABSCESS OF FOOT: Primary | ICD-10-CM

## 2021-06-07 DIAGNOSIS — L02.619 CELLULITIS AND ABSCESS OF FOOT: Primary | ICD-10-CM

## 2021-06-07 PROCEDURE — G0463 HOSPITAL OUTPT CLINIC VISIT: HCPCS

## 2021-06-07 PROCEDURE — 25010000002 LEVOFLOXACIN PER 250 MG: Performed by: INTERNAL MEDICINE

## 2021-06-07 PROCEDURE — 97602 WOUND(S) CARE NON-SELECTIVE: CPT

## 2021-06-07 PROCEDURE — 96365 THER/PROPH/DIAG IV INF INIT: CPT

## 2021-06-07 RX ORDER — LEVOFLOXACIN 5 MG/ML
250 INJECTION, SOLUTION INTRAVENOUS ONCE
Status: CANCELLED
Start: 2021-06-08 | End: 2021-06-08

## 2021-06-07 RX ORDER — LEVOFLOXACIN 5 MG/ML
250 INJECTION, SOLUTION INTRAVENOUS ONCE
Status: COMPLETED | OUTPATIENT
Start: 2021-06-07 | End: 2021-06-07

## 2021-06-07 RX ADMIN — LEVOFLOXACIN 250 MG: 5 INJECTION, SOLUTION INTRAVENOUS at 09:11

## 2021-06-07 NOTE — ADDENDUM NOTE
Encounter addended by: Sarah Washburn MA on: 6/7/2021 9:22 AM   Actions taken: Visit diagnoses modified, Order list changed, Diagnosis association updated, Clinical Note Signed

## 2021-06-07 NOTE — PROGRESS NOTES
Patient needs to be seen in infusion clinic for wound care dressing changes. Patient does not have home health and has no help at home.   Patient needs wound care with the infusion clinic twice a week as he sees wound care once a week.  Wound Care Orders are as follow:    Cleanse with Normal Saline. Apply Hydrofera Blue. Wrap with Kerlix and ACE.

## 2021-06-08 ENCOUNTER — HOSPITAL ENCOUNTER (OUTPATIENT)
Dept: INFUSION THERAPY | Facility: HOSPITAL | Age: 81
Setting detail: INFUSION SERIES
Discharge: HOME | End: 2021-06-08

## 2021-06-08 VITALS
SYSTOLIC BLOOD PRESSURE: 122 MMHG | HEART RATE: 74 BPM | TEMPERATURE: 98.4 F | RESPIRATION RATE: 18 BRPM | DIASTOLIC BLOOD PRESSURE: 59 MMHG

## 2021-06-08 DIAGNOSIS — L02.619 CELLULITIS AND ABSCESS OF FOOT: Primary | ICD-10-CM

## 2021-06-08 DIAGNOSIS — L03.119 CELLULITIS AND ABSCESS OF FOOT: Primary | ICD-10-CM

## 2021-06-08 LAB
ANION GAP SERPL CALCULATED.3IONS-SCNC: 10.2 MMOL/L (ref 5–15)
BUN SERPL-MCNC: 27 MG/DL (ref 8–23)
BUN/CREAT SERPL: 21.1 (ref 7–25)
CALCIUM SPEC-SCNC: 8.7 MG/DL (ref 8.6–10.5)
CHLORIDE SERPL-SCNC: 99 MMOL/L (ref 98–107)
CO2 SERPL-SCNC: 29.8 MMOL/L (ref 22–29)
CREAT SERPL-MCNC: 1.28 MG/DL (ref 0.76–1.27)
CRP SERPL-MCNC: 0.55 MG/DL (ref 0–0.5)
GFR SERPL CREATININE-BSD FRML MDRD: 54 ML/MIN/1.73
GLUCOSE SERPL-MCNC: 95 MG/DL (ref 65–99)
POTASSIUM SERPL-SCNC: 3 MMOL/L (ref 3.5–5.2)
SODIUM SERPL-SCNC: 139 MMOL/L (ref 136–145)

## 2021-06-08 PROCEDURE — 80048 BASIC METABOLIC PNL TOTAL CA: CPT | Performed by: INTERNAL MEDICINE

## 2021-06-08 PROCEDURE — 86140 C-REACTIVE PROTEIN: CPT | Performed by: INTERNAL MEDICINE

## 2021-06-08 PROCEDURE — 25010000002 LEVOFLOXACIN PER 250 MG: Performed by: INTERNAL MEDICINE

## 2021-06-08 PROCEDURE — 96365 THER/PROPH/DIAG IV INF INIT: CPT

## 2021-06-08 RX ORDER — LEVOFLOXACIN 5 MG/ML
250 INJECTION, SOLUTION INTRAVENOUS ONCE
Status: COMPLETED | OUTPATIENT
Start: 2021-06-08 | End: 2021-06-08

## 2021-06-08 RX ORDER — LEVOFLOXACIN 5 MG/ML
250 INJECTION, SOLUTION INTRAVENOUS ONCE
Status: CANCELLED
Start: 2021-06-09 | End: 2021-06-09

## 2021-06-08 RX ADMIN — LEVOFLOXACIN 250 MG: 5 INJECTION, SOLUTION INTRAVENOUS at 08:56

## 2021-06-09 ENCOUNTER — HOSPITAL ENCOUNTER (OUTPATIENT)
Dept: WOUND CARE | Facility: HOSPITAL | Age: 81
Discharge: HOME | End: 2021-06-09
Admitting: NURSE PRACTITIONER

## 2021-06-09 ENCOUNTER — HOSPITAL ENCOUNTER (OUTPATIENT)
Dept: INFUSION THERAPY | Facility: HOSPITAL | Age: 81
Setting detail: INFUSION SERIES
Discharge: HOME | End: 2021-06-09

## 2021-06-09 VITALS
SYSTOLIC BLOOD PRESSURE: 132 MMHG | TEMPERATURE: 97.8 F | RESPIRATION RATE: 18 BRPM | HEART RATE: 75 BPM | DIASTOLIC BLOOD PRESSURE: 64 MMHG

## 2021-06-09 VITALS
TEMPERATURE: 97.8 F | RESPIRATION RATE: 18 BRPM | HEART RATE: 75 BPM | SYSTOLIC BLOOD PRESSURE: 132 MMHG | DIASTOLIC BLOOD PRESSURE: 64 MMHG

## 2021-06-09 DIAGNOSIS — L03.119 CELLULITIS AND ABSCESS OF FOOT: Primary | ICD-10-CM

## 2021-06-09 DIAGNOSIS — T25.221A PARTIAL THICKNESS BURN OF RIGHT FOOT, INITIAL ENCOUNTER: ICD-10-CM

## 2021-06-09 DIAGNOSIS — L02.619 CELLULITIS AND ABSCESS OF FOOT: Primary | ICD-10-CM

## 2021-06-09 DIAGNOSIS — T25.221D PARTIAL THICKNESS BURN OF RIGHT FOOT, SUBSEQUENT ENCOUNTER: Primary | ICD-10-CM

## 2021-06-09 PROCEDURE — 25010000002 LEVOFLOXACIN PER 250 MG: Performed by: INTERNAL MEDICINE

## 2021-06-09 PROCEDURE — 97602 WOUND(S) CARE NON-SELECTIVE: CPT

## 2021-06-09 PROCEDURE — 96365 THER/PROPH/DIAG IV INF INIT: CPT

## 2021-06-09 RX ORDER — LEVOFLOXACIN 5 MG/ML
250 INJECTION, SOLUTION INTRAVENOUS ONCE
Status: CANCELLED
Start: 2021-06-10 | End: 2021-06-10

## 2021-06-09 RX ORDER — LEVOFLOXACIN 5 MG/ML
250 INJECTION, SOLUTION INTRAVENOUS ONCE
Status: COMPLETED | OUTPATIENT
Start: 2021-06-09 | End: 2021-06-09

## 2021-06-09 RX ADMIN — LEVOFLOXACIN 250 MG: 5 INJECTION, SOLUTION INTRAVENOUS at 09:04

## 2021-06-10 ENCOUNTER — HOSPITAL ENCOUNTER (OUTPATIENT)
Dept: INFUSION THERAPY | Facility: HOSPITAL | Age: 81
Setting detail: INFUSION SERIES
Discharge: HOME | End: 2021-06-10

## 2021-06-10 VITALS
SYSTOLIC BLOOD PRESSURE: 119 MMHG | HEART RATE: 75 BPM | RESPIRATION RATE: 18 BRPM | DIASTOLIC BLOOD PRESSURE: 74 MMHG | TEMPERATURE: 97.5 F

## 2021-06-10 DIAGNOSIS — L03.119 CELLULITIS AND ABSCESS OF FOOT: Primary | ICD-10-CM

## 2021-06-10 DIAGNOSIS — L02.619 CELLULITIS AND ABSCESS OF FOOT: Primary | ICD-10-CM

## 2021-06-10 LAB
ANION GAP SERPL CALCULATED.3IONS-SCNC: 7.3 MMOL/L (ref 5–15)
BUN SERPL-MCNC: 26 MG/DL (ref 8–23)
BUN/CREAT SERPL: 20.3 (ref 7–25)
CALCIUM SPEC-SCNC: 8.5 MG/DL (ref 8.6–10.5)
CHLORIDE SERPL-SCNC: 102 MMOL/L (ref 98–107)
CO2 SERPL-SCNC: 31.7 MMOL/L (ref 22–29)
CREAT SERPL-MCNC: 1.28 MG/DL (ref 0.76–1.27)
CRP SERPL-MCNC: 0.6 MG/DL (ref 0–0.5)
GFR SERPL CREATININE-BSD FRML MDRD: 54 ML/MIN/1.73
GLUCOSE SERPL-MCNC: 114 MG/DL (ref 65–99)
POTASSIUM SERPL-SCNC: 3.6 MMOL/L (ref 3.5–5.2)
SODIUM SERPL-SCNC: 141 MMOL/L (ref 136–145)

## 2021-06-10 PROCEDURE — 96365 THER/PROPH/DIAG IV INF INIT: CPT

## 2021-06-10 PROCEDURE — 25010000002 LEVOFLOXACIN PER 250 MG: Performed by: INTERNAL MEDICINE

## 2021-06-10 PROCEDURE — 86140 C-REACTIVE PROTEIN: CPT | Performed by: INTERNAL MEDICINE

## 2021-06-10 PROCEDURE — 80048 BASIC METABOLIC PNL TOTAL CA: CPT | Performed by: INTERNAL MEDICINE

## 2021-06-10 RX ORDER — LEVOFLOXACIN 5 MG/ML
250 INJECTION, SOLUTION INTRAVENOUS ONCE
Status: COMPLETED | OUTPATIENT
Start: 2021-06-10 | End: 2021-06-10

## 2021-06-10 RX ORDER — LEVOFLOXACIN 5 MG/ML
250 INJECTION, SOLUTION INTRAVENOUS ONCE
Status: CANCELLED
Start: 2021-06-10 | End: 2021-06-10

## 2021-06-10 RX ADMIN — LEVOFLOXACIN 250 MG: 5 INJECTION, SOLUTION INTRAVENOUS at 09:14
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
## 2021-06-16 ENCOUNTER — DOCUMENTATION (OUTPATIENT)
Dept: CARDIOLOGY | Facility: HOSPITAL | Age: 81
End: 2021-06-16

## 2021-06-16 ENCOUNTER — HOSPITAL ENCOUNTER (OUTPATIENT)
Dept: WOUND CARE | Facility: HOSPITAL | Age: 81
Discharge: HOME | End: 2021-06-16
Admitting: NURSE PRACTITIONER

## 2021-06-16 VITALS
HEART RATE: 86 BPM | DIASTOLIC BLOOD PRESSURE: 78 MMHG | SYSTOLIC BLOOD PRESSURE: 135 MMHG | RESPIRATION RATE: 18 BRPM | TEMPERATURE: 98.6 F

## 2021-06-16 DIAGNOSIS — T25.221D PARTIAL THICKNESS BURN OF RIGHT FOOT, SUBSEQUENT ENCOUNTER: Primary | ICD-10-CM

## 2021-06-16 PROCEDURE — 97602 WOUND(S) CARE NON-SELECTIVE: CPT

## 2021-06-16 RX ORDER — LIDOCAINE HYDROCHLORIDE 20 MG/ML
JELLY TOPICAL ONCE
Status: COMPLETED | OUTPATIENT
Start: 2021-06-16 | End: 2021-06-16

## 2021-06-16 RX ADMIN — LIDOCAINE HYDROCHLORIDE: 20 JELLY TOPICAL at 09:11
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
## 2021-06-23 ENCOUNTER — HOSPITAL ENCOUNTER (OUTPATIENT)
Dept: WOUND CARE | Facility: HOSPITAL | Age: 81
Discharge: HOME | End: 2021-06-23
Admitting: NURSE PRACTITIONER

## 2021-06-23 VITALS
SYSTOLIC BLOOD PRESSURE: 127 MMHG | RESPIRATION RATE: 18 BRPM | HEART RATE: 78 BPM | DIASTOLIC BLOOD PRESSURE: 82 MMHG | TEMPERATURE: 98.4 F

## 2021-06-23 DIAGNOSIS — T25.221D PARTIAL THICKNESS BURN OF RIGHT FOOT, SUBSEQUENT ENCOUNTER: Primary | ICD-10-CM

## 2021-06-23 DIAGNOSIS — T30.0 BURN: ICD-10-CM

## 2021-06-23 RX ORDER — LIDOCAINE HYDROCHLORIDE 20 MG/ML
JELLY TOPICAL AS NEEDED
Status: CANCELLED | OUTPATIENT
Start: 2021-06-30

## 2021-06-23 RX ORDER — CASTOR OIL AND BALSAM, PERU 788; 87 MG/G; MG/G
1 OINTMENT TOPICAL AS NEEDED
Status: CANCELLED | OUTPATIENT
Start: 2021-06-30

## 2021-06-23 RX ORDER — SODIUM HYPOCHLORITE 2.5 MG/ML
1 SOLUTION TOPICAL AS NEEDED
Status: CANCELLED | OUTPATIENT
Start: 2021-06-30

## 2021-06-23 RX ORDER — SODIUM HYPOCHLORITE 1.25 MG/ML
1 SOLUTION TOPICAL AS NEEDED
Status: CANCELLED | OUTPATIENT
Start: 2022-03-21

## 2021-06-23 RX ORDER — LIDOCAINE HYDROCHLORIDE 20 MG/ML
JELLY TOPICAL AS NEEDED
Status: DISCONTINUED | OUTPATIENT
Start: 2021-06-23 | End: 2021-06-24 | Stop reason: HOSPADM

## 2021-06-23 RX ADMIN — LIDOCAINE HYDROCHLORIDE: 20 JELLY TOPICAL at 08:46
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
## 2021-06-24 ENCOUNTER — HOSPITAL ENCOUNTER (EMERGENCY)
Dept: HOSPITAL 79 - ER1 | Age: 81
Discharge: HOME | End: 2021-06-24
Payer: MEDICARE

## 2021-06-24 DIAGNOSIS — Z95.0: ICD-10-CM

## 2021-06-24 DIAGNOSIS — I50.9: ICD-10-CM

## 2021-06-24 DIAGNOSIS — D64.9: ICD-10-CM

## 2021-06-24 DIAGNOSIS — E87.6: ICD-10-CM

## 2021-06-24 DIAGNOSIS — Z86.73: ICD-10-CM

## 2021-06-24 DIAGNOSIS — I25.10: ICD-10-CM

## 2021-06-24 DIAGNOSIS — J90: Primary | ICD-10-CM

## 2021-06-24 DIAGNOSIS — I11.0: ICD-10-CM

## 2021-06-24 LAB
BUN/CREATININE RATIO: 25 (ref 0–10)
HGB BLD-MCNC: 13 GM/DL (ref 14–17.5)
RED BLOOD COUNT: 4.49 M/UL (ref 4.2–5.5)
WHITE BLOOD COUNT: 5.3 K/UL (ref 4.5–11)

## 2021-06-30 ENCOUNTER — HOSPITAL ENCOUNTER (OUTPATIENT)
Dept: WOUND CARE | Facility: HOSPITAL | Age: 81
Discharge: HOME | End: 2021-06-30
Admitting: NURSE PRACTITIONER

## 2021-06-30 VITALS
HEART RATE: 75 BPM | DIASTOLIC BLOOD PRESSURE: 51 MMHG | TEMPERATURE: 98 F | SYSTOLIC BLOOD PRESSURE: 93 MMHG | RESPIRATION RATE: 18 BRPM

## 2021-06-30 DIAGNOSIS — T30.0 BURN: Primary | ICD-10-CM

## 2021-06-30 PROCEDURE — 97602 WOUND(S) CARE NON-SELECTIVE: CPT
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
## 2021-07-14 ENCOUNTER — HOSPITAL ENCOUNTER (OUTPATIENT)
Dept: WOUND CARE | Facility: HOSPITAL | Age: 81
Discharge: HOME | End: 2021-07-14
Admitting: NURSE PRACTITIONER

## 2021-07-14 VITALS
RESPIRATION RATE: 20 BRPM | DIASTOLIC BLOOD PRESSURE: 58 MMHG | TEMPERATURE: 98.7 F | HEART RATE: 75 BPM | SYSTOLIC BLOOD PRESSURE: 110 MMHG

## 2021-07-14 DIAGNOSIS — T25.221D PARTIAL THICKNESS BURN OF RIGHT FOOT, SUBSEQUENT ENCOUNTER: Primary | ICD-10-CM

## 2021-07-14 PROCEDURE — 97602 WOUND(S) CARE NON-SELECTIVE: CPT
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
## 2021-07-27 ENCOUNTER — HOSPITAL ENCOUNTER (EMERGENCY)
Dept: HOSPITAL 79 - ER1 | Age: 81
Discharge: HOME | End: 2021-07-27
Payer: MEDICARE

## 2021-07-27 DIAGNOSIS — I50.9: ICD-10-CM

## 2021-07-27 DIAGNOSIS — J90: Primary | ICD-10-CM

## 2021-07-27 DIAGNOSIS — N18.9: ICD-10-CM

## 2021-07-27 LAB
BUN/CREATININE RATIO: 24 (ref 0–10)
HGB BLD-MCNC: 12.6 GM/DL (ref 14–17.5)
RED BLOOD COUNT: 4.27 M/UL (ref 4.2–5.5)
WHITE BLOOD COUNT: 8.4 K/UL (ref 4.5–11)

## 2021-08-02 ENCOUNTER — HOSPITAL ENCOUNTER (OUTPATIENT)
Dept: HOSPITAL 79 - RAD | Age: 81
End: 2021-08-02
Attending: INTERNAL MEDICINE
Payer: MEDICARE

## 2021-08-02 DIAGNOSIS — I25.10: ICD-10-CM

## 2021-08-02 DIAGNOSIS — N40.0: ICD-10-CM

## 2021-08-02 DIAGNOSIS — N18.30: ICD-10-CM

## 2021-08-02 DIAGNOSIS — I35.9: Primary | ICD-10-CM

## 2021-08-02 DIAGNOSIS — G47.31: ICD-10-CM

## 2021-08-02 DIAGNOSIS — I50.22: ICD-10-CM

## 2021-08-03 ENCOUNTER — HOSPITAL ENCOUNTER (OUTPATIENT)
Dept: HOSPITAL 79 - KOH-I | Age: 81
End: 2021-08-03
Attending: INTERNAL MEDICINE
Payer: MEDICARE

## 2021-08-03 DIAGNOSIS — K57.90: ICD-10-CM

## 2021-08-03 DIAGNOSIS — K80.80: ICD-10-CM

## 2021-08-03 DIAGNOSIS — K76.6: ICD-10-CM

## 2021-08-03 DIAGNOSIS — K74.60: ICD-10-CM

## 2021-08-03 DIAGNOSIS — R16.1: ICD-10-CM

## 2021-08-03 DIAGNOSIS — R19.01: Primary | ICD-10-CM

## 2021-08-06 ENCOUNTER — HOSPITAL ENCOUNTER (EMERGENCY)
Dept: HOSPITAL 79 - ER1 | Age: 81
Discharge: HOME | End: 2021-08-06
Payer: MEDICARE

## 2021-08-06 DIAGNOSIS — Z20.822: ICD-10-CM

## 2021-08-06 DIAGNOSIS — N18.9: ICD-10-CM

## 2021-08-06 DIAGNOSIS — I50.9: ICD-10-CM

## 2021-08-06 DIAGNOSIS — I13.0: ICD-10-CM

## 2021-08-06 DIAGNOSIS — E78.5: ICD-10-CM

## 2021-08-06 DIAGNOSIS — J90: Primary | ICD-10-CM

## 2021-08-06 LAB
BUN/CREATININE RATIO: 27 (ref 0–10)
HGB BLD-MCNC: 12.3 GM/DL (ref 14–17.5)
RED BLOOD COUNT: 4.24 M/UL (ref 4.2–5.5)
WHITE BLOOD COUNT: 5.4 K/UL (ref 4.5–11)

## 2021-08-06 PROCEDURE — U0002 COVID-19 LAB TEST NON-CDC: HCPCS

## 2021-08-09 ENCOUNTER — HOSPITAL ENCOUNTER (OUTPATIENT)
Dept: HOSPITAL 79 - CT | Age: 81
End: 2021-08-09
Attending: INTERNAL MEDICINE
Payer: MEDICARE

## 2021-08-09 DIAGNOSIS — K80.20: ICD-10-CM

## 2021-08-09 DIAGNOSIS — K76.9: ICD-10-CM

## 2021-08-09 DIAGNOSIS — R19.01: Primary | ICD-10-CM

## 2021-08-11 ENCOUNTER — APPOINTMENT (OUTPATIENT)
Dept: WOUND CARE | Facility: HOSPITAL | Age: 81
End: 2021-08-11

## 2021-08-23 ENCOUNTER — HOSPITAL ENCOUNTER (OUTPATIENT)
Dept: HOSPITAL 79 - EXRD | Age: 81
End: 2021-08-23
Attending: THORACIC SURGERY (CARDIOTHORACIC VASCULAR SURGERY)
Payer: MEDICARE

## 2021-08-23 DIAGNOSIS — J90: ICD-10-CM

## 2021-08-23 DIAGNOSIS — Z00.00: Primary | ICD-10-CM

## 2021-08-23 DIAGNOSIS — R91.8: ICD-10-CM

## 2021-08-25 ENCOUNTER — OFFICE VISIT (OUTPATIENT)
Dept: GASTROENTEROLOGY | Facility: CLINIC | Age: 81
End: 2021-08-25

## 2021-08-25 VITALS
BODY MASS INDEX: 26.05 KG/M2 | SYSTOLIC BLOOD PRESSURE: 96 MMHG | HEART RATE: 78 BPM | DIASTOLIC BLOOD PRESSURE: 52 MMHG | WEIGHT: 182 LBS | HEIGHT: 70 IN

## 2021-08-25 DIAGNOSIS — K74.60 HEPATIC CIRRHOSIS, UNSPECIFIED HEPATIC CIRRHOSIS TYPE, UNSPECIFIED WHETHER ASCITES PRESENT (HCC): Primary | ICD-10-CM

## 2021-08-25 PROCEDURE — 82728 ASSAY OF FERRITIN: CPT | Performed by: PHYSICIAN ASSISTANT

## 2021-08-25 PROCEDURE — 83516 IMMUNOASSAY NONANTIBODY: CPT | Performed by: PHYSICIAN ASSISTANT

## 2021-08-25 PROCEDURE — 82390 ASSAY OF CERULOPLASMIN: CPT | Performed by: PHYSICIAN ASSISTANT

## 2021-08-25 PROCEDURE — 83540 ASSAY OF IRON: CPT | Performed by: PHYSICIAN ASSISTANT

## 2021-08-25 PROCEDURE — 82103 ALPHA-1-ANTITRYPSIN TOTAL: CPT | Performed by: PHYSICIAN ASSISTANT

## 2021-08-25 PROCEDURE — 82104 ALPHA-1-ANTITRYPSIN PHENO: CPT | Performed by: PHYSICIAN ASSISTANT

## 2021-08-25 PROCEDURE — 99203 OFFICE O/P NEW LOW 30 MIN: CPT | Performed by: PHYSICIAN ASSISTANT

## 2021-08-25 PROCEDURE — 80053 COMPREHEN METABOLIC PANEL: CPT | Performed by: PHYSICIAN ASSISTANT

## 2021-08-25 PROCEDURE — 86255 FLUORESCENT ANTIBODY SCREEN: CPT | Performed by: PHYSICIAN ASSISTANT

## 2021-08-25 PROCEDURE — 82105 ALPHA-FETOPROTEIN SERUM: CPT | Performed by: PHYSICIAN ASSISTANT

## 2021-08-25 PROCEDURE — 86038 ANTINUCLEAR ANTIBODIES: CPT | Performed by: PHYSICIAN ASSISTANT

## 2021-08-25 PROCEDURE — 86376 MICROSOMAL ANTIBODY EACH: CPT | Performed by: PHYSICIAN ASSISTANT

## 2021-08-25 PROCEDURE — 82784 ASSAY IGA/IGD/IGG/IGM EACH: CPT | Performed by: PHYSICIAN ASSISTANT

## 2021-08-25 PROCEDURE — 84466 ASSAY OF TRANSFERRIN: CPT | Performed by: PHYSICIAN ASSISTANT

## 2021-08-25 RX ORDER — SACUBITRIL AND VALSARTAN 24; 26 MG/1; MG/1
1 TABLET, FILM COATED ORAL 2 TIMES DAILY
COMMUNITY
End: 2022-04-05 | Stop reason: SDUPTHER

## 2021-08-25 RX ORDER — POTASSIUM CHLORIDE 750 MG/1
20 CAPSULE, EXTENDED RELEASE ORAL 3 TIMES DAILY
COMMUNITY

## 2021-08-25 RX ORDER — HYDROCODONE BITARTRATE AND ACETAMINOPHEN 5; 325 MG/1; MG/1
1 TABLET ORAL EVERY 6 HOURS PRN
COMMUNITY
End: 2022-04-05

## 2021-08-25 NOTE — PROGRESS NOTES
Chief Complaint   Patient presents with   • Cirrhosis       Luis Thompson is a 81 y.o. male who presents to the office today for evaluation of Cirrhosis  .    HPI  Patient presents the clinic today for evaluation of cirrhosis.  He states that he was referred by his primary care due to elevated liver enzymes.  He was unsure at the time that he had been diagnosed with cirrhosis.  He has not had any imaging done of the liver to evaluate previously.  He states that he is very active and his health is fairly good.  He does not have any abdominal pain or noticed any swelling.  He has not gained any weight that he is aware of.  He states that he was a casual drinker on the weekends years ago but not daily.  He does occasionally have diarrhea however it is mainly due to what he eats.  Review of Systems   Constitutional: Negative for fever.   HENT: Negative for trouble swallowing.    Eyes: Negative.    Respiratory: Negative for chest tightness.    Cardiovascular: Negative for chest pain.   Gastrointestinal: Positive for diarrhea. Negative for abdominal distention, abdominal pain, anal bleeding, blood in stool, constipation, nausea and vomiting.   Endocrine: Negative.    Genitourinary: Negative for difficulty urinating.   Musculoskeletal: Positive for back pain.   Skin: Negative.    Allergic/Immunologic: Negative for environmental allergies and food allergies.   Neurological: Negative for headaches.   Hematological: Bruises/bleeds easily.   Psychiatric/Behavioral: Negative.        ACTIVE PROBLEMS:   Specialty Problems     None          PAST MEDICAL HISTORY:  Past Medical History:   Diagnosis Date   • Atrial fibrillation (CMS/HCC)    • CHF (congestive heart failure) (CMS/HCC)    • Coronary artery disease involving coronary bypass graft of native heart without angina pectoris    • Heart disease    • Hyperlipidemia    • Hypertension    • Pleural effusion        SURGICAL HISTORY:  Past Surgical History:   Procedure Laterality  Date   • ANKLE SURGERY     • CARDIAC PACEMAKER PLACEMENT     • CATARACT EXTRACTION     • COLONOSCOPY N/A 9/8/2017    Procedure: COLONOSCOPY;  Surgeon: Magan Hu MD;  Location:  [facility];  Service:    • CORONARY ARTERY BYPASS GRAFT  2002    [facility]   • ENDOSCOPY N/A 9/8/2017    Procedure: ESOPHAGOGASTRODUODENOSCOPY;  Surgeon: Magan Hu MD;  Location:  [facility] OR;  Service:    • HERNIA REPAIR     • HYDROCELE EXCISION / REPAIR     • KNEE SURGERY         FAMILY HISTORY:  Family History   Problem Relation Age of Onset   • Stroke Mother    • Emphysema Father    • Other Brother         heart problems   • Stroke Half-Sister        SOCIAL HISTORY:  Social History     Tobacco Use   • Smoking status: Never Smoker   • Smokeless tobacco: Never Used   Substance Use Topics   • Alcohol use: No       CURRENT MEDICATION:    Current Outpatient Medications:   •  allopurinol (ZYLOPRIM) 100 MG tablet, Take 300 mg by mouth Daily., Disp: , Rfl:   •  bumetanide (BUMEX) 1 MG tablet, Take 1 tablet by mouth 2 (Two) Times a Day., Disp: 60 tablet, Rfl: 3  •  carvedilol (COREG) 25 MG tablet, Take  by mouth Daily., Disp: , Rfl:   •  HYDROcodone-acetaminophen (NORCO) 5-325 MG per tablet, Take 1 tablet by mouth Every 6 (Six) Hours As Needed., Disp: , Rfl:   •  metOLazone (ZAROXOLYN) 2.5 MG tablet, Take 1 tablet by mouth As Needed (edema, dyspnea, weight gain 3 lbs.). Every Mon, wed, fri, Disp: , Rfl:   •  potassium chloride (MICRO-K) 10 MEQ CR capsule, Take 10 mEq by mouth 2 (Two) Times a Day., Disp: , Rfl:   •  sacubitril-valsartan (Entresto) 24-26 MG tablet, Take 1 tablet by mouth 2 (Two) Times a Day., Disp: , Rfl:   •  warfarin (COUMADIN) 5 MG tablet, Take 5 mg by mouth Daily. Mon,Tues,Wed 5mg, Thursday-Sunday 4 mg, Disp: , Rfl:   •  levothyroxine (SYNTHROID, LEVOTHROID) 25 MCG tablet, Take 25 mcg by mouth Daily., Disp: , Rfl:     ALLERGIES:  Iodinated diagnostic agents and Iodine    VISIT VITALS:  BP 96/52 (BP Location: Left  "arm, Patient Position: Sitting, Cuff Size: Adult)   Pulse 78   Ht 177.8 cm (70\")   Wt 82.6 kg (182 lb)   BMI 26.11 kg/m²   Physical Exam  Constitutional:       General: He is not in acute distress.     Appearance: Normal appearance. He is well-developed.   HENT:      Head: Normocephalic and atraumatic.   Eyes:      Pupils: Pupils are equal, round, and reactive to light.   Cardiovascular:      Rate and Rhythm: Normal rate and regular rhythm.      Heart sounds: Normal heart sounds.   Pulmonary:      Effort: Pulmonary effort is normal. No respiratory distress.      Breath sounds: Normal breath sounds. No wheezing, rhonchi or rales.   Abdominal:      General: Abdomen is flat. Bowel sounds are normal. There is no distension.      Palpations: Abdomen is soft. There is no mass.      Tenderness: There is no abdominal tenderness. There is no guarding or rebound.      Hernia: No hernia is present.   Musculoskeletal:         General: No swelling. Normal range of motion.      Cervical back: Normal range of motion and neck supple.      Right lower leg: No edema.      Left lower leg: No edema.   Skin:     General: Skin is warm and dry.   Neurological:      Mental Status: He is alert and oriented to person, place, and time.   Psychiatric:         Attention and Perception: Attention normal.         Mood and Affect: Mood normal.         Speech: Speech normal.         Behavior: Behavior normal. Behavior is cooperative.         Thought Content: Thought content normal.         Assessment/Plan   I will go ahead and perform a full liver work-up.  I will be ordering an ultrasound to evaluate the liver along with obtaining blood work.  Patient will be contacted once these are completed.   Diagnosis Plan   1. Hepatic cirrhosis, unspecified hepatic cirrhosis type, unspecified whether ascites present (CMS/HCC)  US Liver    AFP Tumor Marker    Anti-microsomal Antibody    CBC & Differential    Comprehensive Metabolic Panel    Alpha - 1 - " Antitrypsin Phenotype    ANNE    Celiac Comprehensive Panel    Ceruloplasmin    Ferritin    Iron Profile    Anti-Smooth Muscle Antibody Titer    Mitochondrial Antibodies, M2    Hepatitis Panel, Acute       Return in about 4 weeks (around 9/22/2021).                   This document has been electronically signed by Sukhwinder Alvarez PA-C  August 26, 2021 10:23 EDT    Part of this note may be an electronic transcription/translation of spoken language to printed text using the Dragon Dictation System.

## 2021-08-26 ENCOUNTER — OFFICE VISIT (OUTPATIENT)
Dept: CARDIOLOGY | Facility: CLINIC | Age: 81
End: 2021-08-26

## 2021-08-26 VITALS
BODY MASS INDEX: 26.05 KG/M2 | WEIGHT: 182 LBS | OXYGEN SATURATION: 97 % | HEART RATE: 76 BPM | DIASTOLIC BLOOD PRESSURE: 46 MMHG | SYSTOLIC BLOOD PRESSURE: 94 MMHG | HEIGHT: 70 IN

## 2021-08-26 DIAGNOSIS — I50.22 CHRONIC SYSTOLIC CONGESTIVE HEART FAILURE (HCC): Primary | ICD-10-CM

## 2021-08-26 LAB
ALBUMIN SERPL-MCNC: 4.1 G/DL (ref 3.5–5.2)
ALBUMIN/GLOB SERPL: 1.3 G/DL
ALP SERPL-CCNC: 96 U/L (ref 39–117)
ALPHA-FETOPROTEIN: 1.32 NG/ML (ref 0–8.3)
ALT SERPL W P-5'-P-CCNC: 9 U/L (ref 1–41)
ANION GAP SERPL CALCULATED.3IONS-SCNC: 8.8 MMOL/L (ref 5–15)
AST SERPL-CCNC: 18 U/L (ref 1–40)
BILIRUB SERPL-MCNC: 0.9 MG/DL (ref 0–1.2)
BUN SERPL-MCNC: 39 MG/DL (ref 8–23)
BUN/CREAT SERPL: 30.2 (ref 7–25)
CALCIUM SPEC-SCNC: 9 MG/DL (ref 8.6–10.5)
CERULOPLASMIN SERPL-MCNC: 30 MG/DL (ref 16–31)
CHLORIDE SERPL-SCNC: 101 MMOL/L (ref 98–107)
CO2 SERPL-SCNC: 28.2 MMOL/L (ref 22–29)
CREAT SERPL-MCNC: 1.29 MG/DL (ref 0.76–1.27)
FERRITIN SERPL-MCNC: 196 NG/ML (ref 30–400)
GFR SERPL CREATININE-BSD FRML MDRD: 54 ML/MIN/1.73
GLOBULIN UR ELPH-MCNC: 3.1 GM/DL
GLUCOSE SERPL-MCNC: 100 MG/DL (ref 65–99)
IRON 24H UR-MRATE: 64 MCG/DL (ref 59–158)
IRON SATN MFR SERPL: 16 % (ref 20–50)
POTASSIUM SERPL-SCNC: 3.9 MMOL/L (ref 3.5–5.2)
PROT SERPL-MCNC: 7.2 G/DL (ref 6–8.5)
SODIUM SERPL-SCNC: 138 MMOL/L (ref 136–145)
TIBC SERPL-MCNC: 389 MCG/DL (ref 298–536)
TRANSFERRIN SERPL-MCNC: 261 MG/DL (ref 200–360)

## 2021-08-26 PROCEDURE — 93284 PRGRMG EVAL IMPLANTABLE DFB: CPT | Performed by: INTERNAL MEDICINE

## 2021-08-26 PROCEDURE — 99214 OFFICE O/P EST MOD 30 MIN: CPT | Performed by: INTERNAL MEDICINE

## 2021-08-26 RX ORDER — CARVEDILOL 12.5 MG/1
12.5 TABLET ORAL 2 TIMES DAILY WITH MEALS
Qty: 180 TABLET | Refills: 3 | Status: SHIPPED | OUTPATIENT
Start: 2021-08-26 | End: 2022-04-05 | Stop reason: SDUPTHER

## 2021-08-26 NOTE — PROGRESS NOTES
OFFICE FOLLOW UP     Date of Encounter:2021     Name: Ezequiel Thompson  : 1940  Address: [address] [address]    PCP: Tram Mcgee MD  [address]  [facility] [id number]    Ezequiel Thompson is a 81 y.o. male.      Chief Complaint: Follow up of ICM, VHD    Problem List:   1.    Ischemic cardiomyopathy   a.  History of CABG, [place], [place] (IDB)   b.  Chronic systolic heart failure-EF 10 to 15% (IDB)   c.  BiV ICD (BS) - reportedly placed in [place], [place] (IDB)   d. Echocardiogram, 3/23/2021: EF 26-30%  2.    Mitral valve regurgitation   a. History of MVR, mechanical prosthetic valve, [place], [place] - (IDB)   b. Chronic warfarin therapy, managed by PCP   c. Echo, 3/23/2021: prosthetic MV, Moderate MR, RVSP 45-55 mmHg  3.    History of a left atrial thrombus, inactive on Warfarin  4.    Atrial fibrillation-permanent nature (?), anticoagulated with warfarin, NSR on PM check .  5.    Recurrent pleural effusions with Pleurx catheter (followed by Dr. Roman at [facility] to this point)   a. discontinued Summer 2021, (IDB)  6.    Chronic kidney disease, unknown stage or etiology  7.    Cirrhosis, indeterminant etiology, under evaluation with [facility], 2021  8.    Macular degeneration  9.    Abdominal and inguinal hernias  10.  Hyperlipidemia  11.  Hypothyroid  12.  Surgical history:   a. Bilateral knee surgeries   b. Leg ablations   c. Cataract extraction, bilateral   d. CABG/MVR       Allergies:  Allergies   Allergen Reactions   • Iodinated Diagnostic Agents Hives   • Iodine Hives       Current Medications:  Current Outpatient Medications   Medication Instructions   • allopurinol (ZYLOPRIM) 300 mg, Oral, Daily   • bumetanide (BUMEX) 1 mg, Oral, 2 Times Daily   • carvedilol (COREG) 25 mg, Oral, Daily   • HYDROcodone-acetaminophen (NORCO) 5-325 MG per tablet 1 tablet, Oral, Every 6 Hours PRN   • levothyroxine (SYNTHROID, LEVOTHROID) 25 mcg, Oral, Daily   • metOLazone (ZAROXOLYN)  2.5 mg, Oral, As Needed, Every Mon, wed, fri    • potassium chloride (MICRO-K) 10 MEQ CR capsule 10 mEq, Oral, 2 Times Daily   • sacubitril-valsartan (Entresto) 24-26 MG tablet 1 tablet, Oral, 2 Times Daily   • warfarin (COUMADIN) 5 mg, Oral, Daily Warfarin, Mon,Tues,Wed 5mg, Thursday-Sunday 4 mg        History of Present Illness:      Ezequiel Thompson returns for follow up today. He was first seen by Dr. Alegria back in April and asked to follow up with me. I have never seen this man but he has asked me to assume his cardiac care. He has a history of CABG with MVR that was performed in [place], [place]. He followed with [place] doctors and ICD was placed some time ago. Dr. Mcgee follows warfarin/INR monitoring. He has received COVID-19 vaccination and plans to get booster when available. He takes metolazone 3 times a week generally and actively keeps a log of daily weights and blood pressures. Home blood pressures are 100-110s systolic and weights have been stable. He is taking carvedilol daily due to low blood pressures since beginning Entresto prescribed by Dr. Mcgee. He does not have a home monitor for his ICD. He is able to perform some light yard work and household chores without significant limitations. He denies chest pain, unusual shortness of breath, tachy palpitations, edema, and presyncope/syncope. He admits he does not take levothyroxine every day. He states right Pleurex catheter was recently removed by Dr. Roman at [facility]. We do not have records regarding this. He is also under evaluation with [facility] for cirrhosis. He enjoys talking about his travel history and adventures (hiking and laurie diving, etc).        The following portions of the patient's history were reviewed and updated as appropriate: allergies, current medications and problem list.    ROS: Pertinent positives as listed in the HPI.  All other systems reviewed and negative.    Objective:    Vitals:    08/26/21 1502 08/26/21 1504  "  BP: (!) 89/48 94/46   BP Location: Left arm Left arm   Patient Position: Sitting Standing   Pulse: 76 76   SpO2: 97%    Weight: 82.6 kg (182 lb)    Height: 177.8 cm (70\")      Body mass index is 26.11 kg/m².    Physical Exam:  GENERAL: Alert, cooperative, in no acute distress. Sternotomy unremarkable  HEENT: Normocephalic, no adenopathy, no jugular venous distention  HEART: No discrete PMI is noted. Regular rhythm, normal rate, and no gallops or rubs. Systolic murmur noted, no diastolic murmur. Prosthetic valve clicks present.  LUNGS: Clear to auscultation bilaterally. No wheezing, rales or ronchi.  ABDOMEN: Soft, bowel sounds present, non-tender   NEUROLOGIC: No focal abnormalities involving strength or sensation are noted.   EXTREMITIES: No clubbing, cyanosis, or edema noted.     Diagnostic Data:    Lab Results   Component Value Date    GLUCOSE 100 (H) 08/25/2021    BUN 39 (H) 08/25/2021    CREATININE 1.29 (H) 08/25/2021    EGFRIFNONA 54 (L) 08/25/2021    BCR 30.2 (H) 08/25/2021    K 3.9 08/25/2021    CO2 28.2 08/25/2021    CALCIUM 9.0 08/25/2021    ALBUMIN 4.10 08/25/2021    AST 18 08/25/2021    ALT 9 08/25/2021      Lab Results   Component Value Date    WBC 5.69 06/02/2021    HGB 11.9 (L) 06/02/2021    HCT 37.8 06/02/2021    MCV 90.6 06/02/2021     06/02/2021      Procedures    BSC interrogation today: 2 years battery life, Underlying rhythm SB rate 30s, 1 episode of NST - 4 beats  We will order home monitor for remote transmission.    Assessment and Plan:   1.  CAD/ICM:  Appears well compensated today. His BP is low today but without orthostasis following Entresto initiation. He \"feels better\" on   Entresto. Home BP readings were reviewed and mostly 100-110s systolic. We will alter carvedilol to 12.5 mg twice daily (he was taking 25 mg daily) and ask him to decrease metolazone to weekly unless increase in pedal edema or shortness of breath occur. He is agreeable to this. We will send him a remote " transmission monitor to facilitate routine device monitoring. We will continue Entresto and may consider addition of Jardiance. Samples of Entresto were given to him today.   2.  HLD: With his history of CABG, AHA target for LDL is less than 70. We do not have a lipid panel on file. We will request records from PCP. He is not on a statin. We will follow up after PCP notes reviewed. He needs a statin.   4.  VHD: history of mechanical valve replacement. He denies symptoms of angina and heart failure. Compliant with warfarin and INR monitoring. We will give him a new SBE card for his wallet.     Outside medical records have been requested.    I will see Ezequiel Thompson back in 6 months with *BSC check or sooner on an as needed basis.    Scribed for Roberto Nathan MD by Chastity Silverman RN. 08/26/2021 17:22 EDT.     EMR Dragon/Transcription Disclaimer:  Much of this encounter note is an electronic transcription/translation of spoken language to printed text.  The electronic translation of spoken language may permit erroneous, or at times, nonsensical words or phrases to be inadvertently transcribed.  Although I have reviewed the note for such errors, some may still exist.

## 2021-08-27 LAB
ACTIN IGG SERPL-ACNC: 11 UNITS (ref 0–19)
ANA SER QL: NEGATIVE
ENDOMYSIUM IGA SER QL: NEGATIVE
GLIADIN PEPTIDE IGA SER-ACNC: 4 UNITS (ref 0–19)
GLIADIN PEPTIDE IGG SER-ACNC: 2 UNITS (ref 0–19)
IGA SERPL-MCNC: 158 MG/DL (ref 61–437)
LKM-1 AB SER-ACNC: 2 UNITS (ref 0–20)
TTG IGA SER-ACNC: <2 U/ML (ref 0–3)
TTG IGG SER-ACNC: 11 U/ML (ref 0–5)

## 2021-08-30 LAB
A1AT PHENOTYP SERPL IFE: NORMAL
A1AT SERPL-MCNC: 166 MG/DL (ref 101–187)

## 2021-09-03 ENCOUNTER — HOSPITAL ENCOUNTER (OUTPATIENT)
Dept: ULTRASOUND IMAGING | Facility: HOSPITAL | Age: 81
Discharge: HOME | End: 2021-09-03
Admitting: PHYSICIAN ASSISTANT

## 2021-09-03 DIAGNOSIS — K74.60 HEPATIC CIRRHOSIS, UNSPECIFIED HEPATIC CIRRHOSIS TYPE, UNSPECIFIED WHETHER ASCITES PRESENT (HCC): ICD-10-CM

## 2021-09-03 PROCEDURE — 76705 ECHO EXAM OF ABDOMEN: CPT

## 2021-09-03 PROCEDURE — 76705 ECHO EXAM OF ABDOMEN: CPT | Performed by: RADIOLOGY

## 2021-09-07 ENCOUNTER — TELEPHONE (OUTPATIENT)
Dept: UROLOGY | Facility: CLINIC | Age: 81
End: 2021-09-07

## 2021-09-07 ENCOUNTER — HOSPITAL ENCOUNTER (OUTPATIENT)
Dept: ULTRASOUND IMAGING | Facility: HOSPITAL | Age: 81
Discharge: HOME | End: 2021-09-07
Admitting: INTERNAL MEDICINE

## 2021-09-07 DIAGNOSIS — N28.1 SIMPLE RENAL CYST: ICD-10-CM

## 2021-09-07 PROCEDURE — 76775 US EXAM ABDO BACK WALL LIM: CPT | Performed by: RADIOLOGY

## 2021-09-07 PROCEDURE — 76775 US EXAM ABDO BACK WALL LIM: CPT

## 2021-09-07 NOTE — TELEPHONE ENCOUNTER
----- Message from Sukhwinder Alvarez PA-C sent at 9/3/2021  1:35 PM EDT -----  Can you please let Luis know that his liver appears to be stable from the last CT scan we have on file 3 years ago?  He does have some engorgement of the veins in the liver which is normal for people with cirrhosis.

## 2021-09-14 ENCOUNTER — TELEPHONE (OUTPATIENT)
Dept: GASTROENTEROLOGY | Facility: CLINIC | Age: 81
End: 2021-09-14

## 2021-09-14 NOTE — TELEPHONE ENCOUNTER
----- Message from Sukhwinder Alvarez PA-C sent at 9/14/2021  4:20 PM EDT -----  Can you please contact Luis and let them know that his blood work has came back and is stable from previous blood work we have on file?  Let him know that his ultrasound is also unchanged-he does have cirrhotic changes noted in the liver.

## 2021-09-20 ENCOUNTER — TELEPHONE (OUTPATIENT)
Dept: CARDIOLOGY | Facility: CLINIC | Age: 81
End: 2021-09-20

## 2021-09-20 NOTE — TELEPHONE ENCOUNTER
Mrs Thompson called today to ask for assistance getting her husbands latitude monitor set up. I worked with them for 30 min and could not get the monitor to transmit. They had cords plugged in it that did not need to be there and I could not explain  to them what to do. I gave them the number to tech support at [facility] for assistance.

## 2021-09-24 ENCOUNTER — OFFICE VISIT (OUTPATIENT)
Dept: GASTROENTEROLOGY | Facility: CLINIC | Age: 81
End: 2021-09-24

## 2021-09-24 VITALS
BODY MASS INDEX: 26.92 KG/M2 | HEART RATE: 81 BPM | HEIGHT: 70 IN | DIASTOLIC BLOOD PRESSURE: 61 MMHG | WEIGHT: 188 LBS | SYSTOLIC BLOOD PRESSURE: 101 MMHG

## 2021-09-24 DIAGNOSIS — K74.60 HEPATIC CIRRHOSIS, UNSPECIFIED HEPATIC CIRRHOSIS TYPE, UNSPECIFIED WHETHER ASCITES PRESENT (HCC): Primary | ICD-10-CM

## 2021-09-24 PROCEDURE — 99213 OFFICE O/P EST LOW 20 MIN: CPT | Performed by: PHYSICIAN ASSISTANT

## 2021-09-24 NOTE — PROGRESS NOTES
Chief Complaint   Patient presents with   • Cirrhosis       Ezequiel Thompson is a 81 y.o. male who presents to the office today for evaluation of Cirrhosis  .    HPI  Patient presents to the clinic today for follow-up of cirrhosis.  He was last seen in clinic on 8/25 where a full liver work-up was completed as well as an ultrasound ordered.  Results of the patient's blood work revealed normal liver enzymes, normal AFP, and low iron saturation.  His ultrasound revealed liver cirrhosis with noted fatty changes of the liver as well.  He is doing well since being in the clinic roughly a month ago.  He has no GI complaints currently.    Review of Systems   Constitutional: Negative for fever.   HENT: Negative for trouble swallowing.    Eyes: Negative.    Respiratory: Negative for chest tightness.    Cardiovascular: Negative for chest pain.   Gastrointestinal: Negative for abdominal distention, abdominal pain, anal bleeding, blood in stool, constipation, diarrhea, nausea, rectal pain and vomiting.   Endocrine: Negative.    Genitourinary: Negative for difficulty urinating.   Musculoskeletal: Positive for back pain.   Skin: Negative.    Allergic/Immunologic: Negative for environmental allergies and food allergies.   Neurological: Negative for headaches.   Hematological: Bruises/bleeds easily.   Psychiatric/Behavioral: Negative.        ACTIVE PROBLEMS:   Specialty Problems     None          PAST MEDICAL HISTORY:  Past Medical History:   Diagnosis Date   • Atrial fibrillation (CMS/HCC)    • CHF (congestive heart failure) (CMS/HCC)    • Coronary artery disease involving coronary bypass graft of native heart without angina pectoris    • Heart disease    • Hyperlipidemia    • Hypertension    • Pleural effusion        SURGICAL HISTORY:  Past Surgical History:   Procedure Laterality Date   • ANKLE SURGERY     • CARDIAC PACEMAKER PLACEMENT     • CATARACT EXTRACTION     • COLONOSCOPY N/A 9/8/2017    Procedure: COLONOSCOPY;   "Surgeon: Magan Hu MD;  Location:  [facility];  Service:    • CORONARY ARTERY BYPASS GRAFT  2002    [facility]   • ENDOSCOPY N/A 9/8/2017    Procedure: ESOPHAGOGASTRODUODENOSCOPY;  Surgeon: Magan Hu MD;  Location:  [facility];  Service:    • HERNIA REPAIR     • HYDROCELE EXCISION / REPAIR     • KNEE SURGERY         FAMILY HISTORY:  Family History   Problem Relation Age of Onset   • Stroke Mother    • Emphysema Father    • Other Brother         heart problems   • Stroke Half-Sister        SOCIAL HISTORY:  Social History     Tobacco Use   • Smoking status: Never Smoker   • Smokeless tobacco: Never Used   Substance Use Topics   • Alcohol use: No       CURRENT MEDICATION:    Current Outpatient Medications:   •  allopurinol (ZYLOPRIM) 100 MG tablet, Take 300 mg by mouth Daily., Disp: , Rfl:   •  bumetanide (BUMEX) 1 MG tablet, Take 1 tablet by mouth 2 (Two) Times a Day., Disp: 60 tablet, Rfl: 3  •  carvedilol (COREG) 12.5 MG tablet, Take 1 tablet by mouth 2 (Two) Times a Day With Meals., Disp: 180 tablet, Rfl: 3  •  HYDROcodone-acetaminophen (NORCO) 5-325 MG per tablet, Take 1 tablet by mouth Every 6 (Six) Hours As Needed., Disp: , Rfl:   •  levothyroxine (SYNTHROID, LEVOTHROID) 25 MCG tablet, Take 25 mcg by mouth Daily., Disp: , Rfl:   •  metOLazone (ZAROXOLYN) 2.5 MG tablet, Take 1 tablet by mouth As Needed (edema, dyspnea, weight gain 3 lbs.). Every Mon, wed, fri, Disp: , Rfl:   •  potassium chloride (MICRO-K) 10 MEQ CR capsule, Take 10 mEq by mouth 2 (Two) Times a Day., Disp: , Rfl:   •  sacubitril-valsartan (Entresto) 24-26 MG tablet, Take 1 tablet by mouth 2 (Two) Times a Day., Disp: , Rfl:   •  warfarin (COUMADIN) 5 MG tablet, Take 5 mg by mouth Daily. Mon,Tues,Wed 5mg, Thursday-Sunday 4 mg, Disp: , Rfl:     ALLERGIES:  Iodinated diagnostic agents and Iodine    VISIT VITALS:  /61   Pulse 81   Ht 177.8 cm (70\")   Wt 85.3 kg (188 lb)   BMI 26.98 kg/m²   Physical Exam  Constitutional:       " General: He is not in acute distress.     Appearance: Normal appearance. He is well-developed.   HENT:      Head: Normocephalic and atraumatic.   Eyes:      Pupils: Pupils are equal, round, and reactive to light.   Cardiovascular:      Rate and Rhythm: Normal rate and regular rhythm.      Heart sounds: Normal heart sounds.   Pulmonary:      Effort: Pulmonary effort is normal. No respiratory distress.      Breath sounds: Normal breath sounds. No wheezing, rhonchi or rales.   Abdominal:      General: Abdomen is flat. Bowel sounds are normal. There is no distension.      Palpations: Abdomen is soft. There is no mass.      Tenderness: There is no abdominal tenderness. There is no guarding or rebound.      Hernia: No hernia is present.   Musculoskeletal:         General: No swelling. Normal range of motion.      Cervical back: Normal range of motion and neck supple.      Right lower leg: No edema.      Left lower leg: No edema.   Skin:     General: Skin is warm and dry.   Neurological:      Mental Status: He is alert and oriented to person, place, and time.   Psychiatric:         Attention and Perception: Attention normal.         Mood and Affect: Mood normal.         Speech: Speech normal.         Behavior: Behavior normal. Behavior is cooperative.         Thought Content: Thought content normal.           Assessment/Plan    Patient's recent blood work and ultrasound were reviewed with him.  He voiced understanding of the results.  He is aware that he does have cirrhosis and we will need to keep a close eye on it-we will have him follow-up in 6 months for more blood work.  He was notified that if he has any new onset confusion or extreme swelling in the abdomen area to contact us-she may need started on lactulose for ammonia buildup or a paracentesis done for fluid removal.     Diagnosis Plan   1. Hepatic cirrhosis, unspecified hepatic cirrhosis type, unspecified whether ascites present (HCC)         Return in about 6  months (around 3/24/2022).                   This document has been electronically signed by Sukhwinder Alvarez PA-C  September 28, 2021 15:24 EDT    Part of this note may be an electronic transcription/translation of spoken language to printed text using the Dragon Dictation System.

## 2021-10-05 ENCOUNTER — TRANSCRIBE ORDERS (OUTPATIENT)
Dept: ADMINISTRATIVE | Facility: HOSPITAL | Age: 81
End: 2021-10-05

## 2021-10-05 DIAGNOSIS — N28.1 SIMPLE RENAL CYST: Primary | ICD-10-CM

## 2021-10-14 PROCEDURE — 93295 DEV INTERROG REMOTE 1/2/MLT: CPT | Performed by: INTERNAL MEDICINE

## 2021-10-14 PROCEDURE — 93296 REM INTERROG EVL PM/IDS: CPT | Performed by: INTERNAL MEDICINE

## 2021-10-22 ENCOUNTER — IMMUNIZATION (OUTPATIENT)
Dept: VACCINE CLINIC | Facility: HOSPITAL | Age: 81
End: 2021-10-22

## 2021-10-22 PROCEDURE — 0004A ADM SARSCOV2 30MCG/0.3ML BOOSTER: CPT | Performed by: INTERNAL MEDICINE

## 2021-10-22 PROCEDURE — 91300 HC SARSCOV02 VAC 30MCG/0.3ML IM: CPT | Performed by: INTERNAL MEDICINE

## 2021-11-01 ENCOUNTER — HOSPITAL ENCOUNTER (OUTPATIENT)
Dept: HOSPITAL 79 - EXRD | Age: 81
End: 2021-11-01
Attending: INTERNAL MEDICINE
Payer: MEDICARE

## 2021-11-01 DIAGNOSIS — N40.0: ICD-10-CM

## 2021-11-01 DIAGNOSIS — Z00.00: Primary | ICD-10-CM

## 2021-11-01 DIAGNOSIS — N18.30: ICD-10-CM

## 2021-11-01 DIAGNOSIS — G47.31: ICD-10-CM

## 2021-11-01 DIAGNOSIS — J90: ICD-10-CM

## 2021-11-01 DIAGNOSIS — I25.10: ICD-10-CM

## 2021-11-01 DIAGNOSIS — I35.9: ICD-10-CM

## 2021-11-01 DIAGNOSIS — Z95.2: ICD-10-CM

## 2021-11-01 DIAGNOSIS — R91.8: ICD-10-CM

## 2021-11-01 DIAGNOSIS — I50.22: ICD-10-CM

## 2022-01-13 PROCEDURE — 93296 REM INTERROG EVL PM/IDS: CPT | Performed by: STUDENT IN AN ORGANIZED HEALTH CARE EDUCATION/TRAINING PROGRAM

## 2022-01-13 PROCEDURE — 93295 DEV INTERROG REMOTE 1/2/MLT: CPT | Performed by: STUDENT IN AN ORGANIZED HEALTH CARE EDUCATION/TRAINING PROGRAM

## 2022-02-01 ENCOUNTER — TRANSCRIBE ORDERS (OUTPATIENT)
Dept: ADMINISTRATIVE | Facility: HOSPITAL | Age: 82
End: 2022-02-01

## 2022-02-01 ENCOUNTER — LAB (OUTPATIENT)
Dept: LAB | Facility: HOSPITAL | Age: 82
End: 2022-02-01

## 2022-02-01 ENCOUNTER — HOSPITAL ENCOUNTER (OUTPATIENT)
Dept: ULTRASOUND IMAGING | Facility: HOSPITAL | Age: 82
Discharge: HOME | End: 2022-02-01

## 2022-02-01 DIAGNOSIS — E87.6 HYPOKALEMIA: ICD-10-CM

## 2022-02-01 DIAGNOSIS — N17.9 ACUTE RENAL FAILURE, UNSPECIFIED ACUTE RENAL FAILURE TYPE: ICD-10-CM

## 2022-02-01 DIAGNOSIS — N17.9 ACUTE RENAL FAILURE, UNSPECIFIED ACUTE RENAL FAILURE TYPE: Primary | ICD-10-CM

## 2022-02-01 DIAGNOSIS — N28.1 SIMPLE RENAL CYST: ICD-10-CM

## 2022-02-01 LAB
ALBUMIN SERPL-MCNC: 4.1 G/DL (ref 3.5–5.2)
ALBUMIN/GLOB SERPL: 1.6 G/DL
ALP SERPL-CCNC: 75 U/L (ref 39–117)
ALT SERPL W P-5'-P-CCNC: 5 U/L (ref 1–41)
ANION GAP SERPL CALCULATED.3IONS-SCNC: 8.1 MMOL/L (ref 5–15)
AST SERPL-CCNC: 18 U/L (ref 1–40)
BILIRUB SERPL-MCNC: 1 MG/DL (ref 0–1.2)
BILIRUB UR QL STRIP: NEGATIVE
BUN SERPL-MCNC: 27 MG/DL (ref 8–23)
BUN/CREAT SERPL: 19.9 (ref 7–25)
CALCIUM SPEC-SCNC: 8.6 MG/DL (ref 8.6–10.5)
CHLORIDE SERPL-SCNC: 103 MMOL/L (ref 98–107)
CLARITY UR: CLEAR
CO2 SERPL-SCNC: 30.9 MMOL/L (ref 22–29)
COLOR UR: YELLOW
CREAT SERPL-MCNC: 1.36 MG/DL (ref 0.76–1.27)
CREAT UR-MCNC: 29 MG/DL
GFR SERPL CREATININE-BSD FRML MDRD: 50 ML/MIN/1.73
GLOBULIN UR ELPH-MCNC: 2.6 GM/DL
GLUCOSE SERPL-MCNC: 99 MG/DL (ref 65–99)
GLUCOSE UR STRIP-MCNC: NEGATIVE MG/DL
HGB UR QL STRIP.AUTO: NEGATIVE
KETONES UR QL STRIP: NEGATIVE
LEUKOCYTE ESTERASE UR QL STRIP.AUTO: NEGATIVE
MAGNESIUM SERPL-MCNC: 2.3 MG/DL (ref 1.6–2.4)
NITRITE UR QL STRIP: NEGATIVE
PH UR STRIP.AUTO: 7 [PH] (ref 5–8)
POTASSIUM SERPL-SCNC: 4.2 MMOL/L (ref 3.5–5.2)
PROT ?TM UR-MCNC: 4 MG/DL
PROT SERPL-MCNC: 6.7 G/DL (ref 6–8.5)
PROT UR QL STRIP: NEGATIVE
PROT/CREAT UR: 137.9 MG/G CREA (ref 0–200)
SODIUM SERPL-SCNC: 142 MMOL/L (ref 136–145)
SP GR UR STRIP: 1.01 (ref 1–1.03)
UROBILINOGEN UR QL STRIP: NORMAL

## 2022-02-01 PROCEDURE — 82570 ASSAY OF URINE CREATININE: CPT

## 2022-02-01 PROCEDURE — 83735 ASSAY OF MAGNESIUM: CPT

## 2022-02-01 PROCEDURE — 36415 COLL VENOUS BLD VENIPUNCTURE: CPT

## 2022-02-01 PROCEDURE — 76775 US EXAM ABDO BACK WALL LIM: CPT

## 2022-02-01 PROCEDURE — 84156 ASSAY OF PROTEIN URINE: CPT

## 2022-02-01 PROCEDURE — 76775 US EXAM ABDO BACK WALL LIM: CPT | Performed by: RADIOLOGY

## 2022-02-01 PROCEDURE — 81003 URINALYSIS AUTO W/O SCOPE: CPT

## 2022-02-01 PROCEDURE — 80053 COMPREHEN METABOLIC PANEL: CPT

## 2022-03-18 ENCOUNTER — TRANSCRIBE ORDERS (OUTPATIENT)
Dept: ONCOLOGY | Facility: HOSPITAL | Age: 82
End: 2022-03-18

## 2022-03-18 ENCOUNTER — INFUSION (OUTPATIENT)
Dept: ONCOLOGY | Facility: HOSPITAL | Age: 82
End: 2022-03-18

## 2022-03-18 VITALS
RESPIRATION RATE: 18 BRPM | TEMPERATURE: 98 F | WEIGHT: 193.3 LBS | OXYGEN SATURATION: 98 % | HEART RATE: 74 BPM | DIASTOLIC BLOOD PRESSURE: 59 MMHG | SYSTOLIC BLOOD PRESSURE: 108 MMHG | BODY MASS INDEX: 27.74 KG/M2

## 2022-03-18 DIAGNOSIS — L03.119 CELLULITIS OF FOOT: Primary | ICD-10-CM

## 2022-03-18 LAB
ALBUMIN SERPL-MCNC: 4.42 G/DL (ref 3.5–5.2)
ALBUMIN/GLOB SERPL: 1.4 G/DL
ALP SERPL-CCNC: 88 U/L (ref 39–117)
ALT SERPL W P-5'-P-CCNC: 9 U/L (ref 1–41)
ANION GAP SERPL CALCULATED.3IONS-SCNC: 15.4 MMOL/L (ref 5–15)
AST SERPL-CCNC: 18 U/L (ref 1–40)
BASOPHILS # BLD AUTO: 0.05 10*3/MM3 (ref 0–0.2)
BASOPHILS NFR BLD AUTO: 0.7 % (ref 0–1.5)
BILIRUB SERPL-MCNC: 1.1 MG/DL (ref 0–1.2)
BUN SERPL-MCNC: 59 MG/DL (ref 8–23)
BUN/CREAT SERPL: 34.7 (ref 7–25)
CALCIUM SPEC-SCNC: 9.4 MG/DL (ref 8.6–10.5)
CHLORIDE SERPL-SCNC: 96 MMOL/L (ref 98–107)
CO2 SERPL-SCNC: 28.6 MMOL/L (ref 22–29)
CREAT SERPL-MCNC: 1.7 MG/DL (ref 0.76–1.27)
CRP SERPL-MCNC: 0.43 MG/DL (ref 0–0.5)
DEPRECATED RDW RBC AUTO: 51.4 FL (ref 37–54)
EGFRCR SERPLBLD CKD-EPI 2021: 40.2 ML/MIN/1.73
EOSINOPHIL # BLD AUTO: 0.3 10*3/MM3 (ref 0–0.4)
EOSINOPHIL NFR BLD AUTO: 4.5 % (ref 0.3–6.2)
ERYTHROCYTE [DISTWIDTH] IN BLOOD BY AUTOMATED COUNT: 15.6 % (ref 12.3–15.4)
GLOBULIN UR ELPH-MCNC: 3.2 GM/DL
GLUCOSE SERPL-MCNC: 110 MG/DL (ref 65–99)
HCT VFR BLD AUTO: 37.4 % (ref 37.5–51)
HGB BLD-MCNC: 12.4 G/DL (ref 13–17.7)
IMM GRANULOCYTES # BLD AUTO: 0.04 10*3/MM3 (ref 0–0.05)
IMM GRANULOCYTES NFR BLD AUTO: 0.6 % (ref 0–0.5)
LYMPHOCYTES # BLD AUTO: 0.9 10*3/MM3 (ref 0.7–3.1)
LYMPHOCYTES NFR BLD AUTO: 13.5 % (ref 19.6–45.3)
MCH RBC QN AUTO: 29.8 PG (ref 26.6–33)
MCHC RBC AUTO-ENTMCNC: 33.2 G/DL (ref 31.5–35.7)
MCV RBC AUTO: 89.9 FL (ref 79–97)
MONOCYTES # BLD AUTO: 0.69 10*3/MM3 (ref 0.1–0.9)
MONOCYTES NFR BLD AUTO: 10.3 % (ref 5–12)
NEUTROPHILS NFR BLD AUTO: 4.7 10*3/MM3 (ref 1.7–7)
NEUTROPHILS NFR BLD AUTO: 70.4 % (ref 42.7–76)
NRBC BLD AUTO-RTO: 0 /100 WBC (ref 0–0.2)
PLATELET # BLD AUTO: 142 10*3/MM3 (ref 140–450)
PMV BLD AUTO: 11.4 FL (ref 6–12)
POTASSIUM SERPL-SCNC: 3.1 MMOL/L (ref 3.5–5.2)
PROT SERPL-MCNC: 7.6 G/DL (ref 6–8.5)
RBC # BLD AUTO: 4.16 10*6/MM3 (ref 4.14–5.8)
SODIUM SERPL-SCNC: 140 MMOL/L (ref 136–145)
WBC NRBC COR # BLD: 6.68 10*3/MM3 (ref 3.4–10.8)

## 2022-03-18 PROCEDURE — 86140 C-REACTIVE PROTEIN: CPT | Performed by: INTERNAL MEDICINE

## 2022-03-18 PROCEDURE — 80053 COMPREHEN METABOLIC PANEL: CPT | Performed by: INTERNAL MEDICINE

## 2022-03-18 PROCEDURE — 25010000002 CEFTRIAXONE PER 250 MG: Performed by: INTERNAL MEDICINE

## 2022-03-18 PROCEDURE — 85025 COMPLETE CBC W/AUTO DIFF WBC: CPT | Performed by: INTERNAL MEDICINE

## 2022-03-18 PROCEDURE — 96365 THER/PROPH/DIAG IV INF INIT: CPT

## 2022-03-18 PROCEDURE — 25010000002 DAPTOMYCIN PER 1 MG: Performed by: INTERNAL MEDICINE

## 2022-03-18 PROCEDURE — 96367 TX/PROPH/DG ADDL SEQ IV INF: CPT

## 2022-03-18 RX ADMIN — CEFTRIAXONE 1 G: 10 INJECTION, POWDER, FOR SOLUTION INTRAVENOUS at 14:43

## 2022-03-18 RX ADMIN — DAPTOMYCIN 400 MG: 500 INJECTION, POWDER, LYOPHILIZED, FOR SOLUTION INTRAVENOUS at 14:51

## 2022-03-19 ENCOUNTER — INFUSION (OUTPATIENT)
Dept: ONCOLOGY | Facility: HOSPITAL | Age: 82
End: 2022-03-19

## 2022-03-19 VITALS
TEMPERATURE: 98.2 F | RESPIRATION RATE: 18 BRPM | SYSTOLIC BLOOD PRESSURE: 124 MMHG | HEART RATE: 75 BPM | DIASTOLIC BLOOD PRESSURE: 60 MMHG

## 2022-03-19 DIAGNOSIS — L03.119 CELLULITIS OF FOOT: Primary | ICD-10-CM

## 2022-03-19 PROCEDURE — 25010000002 DAPTOMYCIN PER 1 MG: Performed by: INTERNAL MEDICINE

## 2022-03-19 PROCEDURE — 25010000002 CEFTRIAXONE PER 250 MG: Performed by: INTERNAL MEDICINE

## 2022-03-19 PROCEDURE — 96365 THER/PROPH/DIAG IV INF INIT: CPT

## 2022-03-19 PROCEDURE — 96375 TX/PRO/DX INJ NEW DRUG ADDON: CPT

## 2022-03-19 RX ADMIN — DAPTOMYCIN 400 MG: 500 INJECTION, POWDER, LYOPHILIZED, FOR SOLUTION INTRAVENOUS at 08:35

## 2022-03-19 RX ADMIN — CEFTRIAXONE 1 G: 10 INJECTION, POWDER, FOR SOLUTION INTRAVENOUS at 09:19

## 2022-03-20 ENCOUNTER — INFUSION (OUTPATIENT)
Dept: ONCOLOGY | Facility: HOSPITAL | Age: 82
End: 2022-03-20

## 2022-03-20 VITALS
SYSTOLIC BLOOD PRESSURE: 125 MMHG | RESPIRATION RATE: 18 BRPM | TEMPERATURE: 98.2 F | HEART RATE: 79 BPM | DIASTOLIC BLOOD PRESSURE: 75 MMHG

## 2022-03-20 DIAGNOSIS — L03.119 CELLULITIS OF FOOT: Primary | ICD-10-CM

## 2022-03-20 PROCEDURE — 25010000002 CEFTRIAXONE PER 250 MG: Performed by: INTERNAL MEDICINE

## 2022-03-20 PROCEDURE — 96365 THER/PROPH/DIAG IV INF INIT: CPT

## 2022-03-20 PROCEDURE — 96375 TX/PRO/DX INJ NEW DRUG ADDON: CPT

## 2022-03-20 PROCEDURE — 25010000002 DAPTOMYCIN PER 1 MG: Performed by: INTERNAL MEDICINE

## 2022-03-20 RX ADMIN — DAPTOMYCIN 400 MG: 500 INJECTION, POWDER, LYOPHILIZED, FOR SOLUTION INTRAVENOUS at 08:31

## 2022-03-20 RX ADMIN — CEFTRIAXONE 1 G: 10 INJECTION, POWDER, FOR SOLUTION INTRAVENOUS at 09:01

## 2022-03-21 ENCOUNTER — INFUSION (OUTPATIENT)
Dept: ONCOLOGY | Facility: HOSPITAL | Age: 82
End: 2022-03-21

## 2022-03-21 ENCOUNTER — HOSPITAL ENCOUNTER (OUTPATIENT)
Dept: WOUND CARE | Facility: HOSPITAL | Age: 82
Discharge: HOME | End: 2022-03-21
Admitting: NURSE PRACTITIONER

## 2022-03-21 VITALS
SYSTOLIC BLOOD PRESSURE: 98 MMHG | HEART RATE: 75 BPM | DIASTOLIC BLOOD PRESSURE: 55 MMHG | RESPIRATION RATE: 18 BRPM | OXYGEN SATURATION: 99 % | TEMPERATURE: 98.4 F

## 2022-03-21 VITALS
RESPIRATION RATE: 18 BRPM | SYSTOLIC BLOOD PRESSURE: 96 MMHG | HEART RATE: 74 BPM | DIASTOLIC BLOOD PRESSURE: 48 MMHG | TEMPERATURE: 97.5 F

## 2022-03-21 DIAGNOSIS — S81.802S WOUND OF LEFT LOWER EXTREMITY, SEQUELA: Primary | ICD-10-CM

## 2022-03-21 DIAGNOSIS — L03.119 CELLULITIS OF FOOT: Primary | ICD-10-CM

## 2022-03-21 DIAGNOSIS — L97.922 NON-PRESSURE CHRONIC ULCER OF UNSPECIFIED PART OF LEFT LOWER LEG WITH FAT LAYER EXPOSED: ICD-10-CM

## 2022-03-21 DIAGNOSIS — M79.605 PAIN IN LEFT LEG: ICD-10-CM

## 2022-03-21 LAB
ANION GAP SERPL CALCULATED.3IONS-SCNC: 11.5 MMOL/L (ref 5–15)
BUN SERPL-MCNC: 48 MG/DL (ref 8–23)
BUN/CREAT SERPL: 32.9 (ref 7–25)
CALCIUM SPEC-SCNC: 7.9 MG/DL (ref 8.6–10.5)
CHLORIDE SERPL-SCNC: 101 MMOL/L (ref 98–107)
CO2 SERPL-SCNC: 26.5 MMOL/L (ref 22–29)
CREAT SERPL-MCNC: 1.46 MG/DL (ref 0.76–1.27)
EGFRCR SERPLBLD CKD-EPI 2021: 48.3 ML/MIN/1.73
GLUCOSE SERPL-MCNC: 120 MG/DL (ref 65–99)
POTASSIUM SERPL-SCNC: 3.2 MMOL/L (ref 3.5–5.2)
SODIUM SERPL-SCNC: 139 MMOL/L (ref 136–145)

## 2022-03-21 PROCEDURE — 96375 TX/PRO/DX INJ NEW DRUG ADDON: CPT

## 2022-03-21 PROCEDURE — 25010000002 DAPTOMYCIN PER 1 MG: Performed by: INTERNAL MEDICINE

## 2022-03-21 PROCEDURE — 96374 THER/PROPH/DIAG INJ IV PUSH: CPT

## 2022-03-21 PROCEDURE — 96365 THER/PROPH/DIAG IV INF INIT: CPT

## 2022-03-21 PROCEDURE — 25010000002 CEFTRIAXONE PER 250 MG: Performed by: INTERNAL MEDICINE

## 2022-03-21 PROCEDURE — 80048 BASIC METABOLIC PNL TOTAL CA: CPT | Performed by: INTERNAL MEDICINE

## 2022-03-21 PROCEDURE — 97602 WOUND(S) CARE NON-SELECTIVE: CPT

## 2022-03-21 RX ADMIN — CEFTRIAXONE 1 G: 10 INJECTION, POWDER, FOR SOLUTION INTRAVENOUS at 08:35

## 2022-03-21 RX ADMIN — DAPTOMYCIN 400 MG: 500 INJECTION, POWDER, LYOPHILIZED, FOR SOLUTION INTRAVENOUS at 08:35
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
## 2022-03-22 ENCOUNTER — INFUSION (OUTPATIENT)
Dept: ONCOLOGY | Facility: HOSPITAL | Age: 82
End: 2022-03-22

## 2022-03-22 VITALS
HEART RATE: 76 BPM | SYSTOLIC BLOOD PRESSURE: 102 MMHG | OXYGEN SATURATION: 99 % | DIASTOLIC BLOOD PRESSURE: 54 MMHG | TEMPERATURE: 98.9 F | RESPIRATION RATE: 18 BRPM

## 2022-03-22 DIAGNOSIS — L03.119 CELLULITIS OF FOOT: Primary | ICD-10-CM

## 2022-03-22 PROCEDURE — 96365 THER/PROPH/DIAG IV INF INIT: CPT

## 2022-03-22 PROCEDURE — 96375 TX/PRO/DX INJ NEW DRUG ADDON: CPT

## 2022-03-22 PROCEDURE — 25010000002 DAPTOMYCIN PER 1 MG: Performed by: INTERNAL MEDICINE

## 2022-03-22 PROCEDURE — 25010000002 CEFTRIAXONE PER 250 MG: Performed by: INTERNAL MEDICINE

## 2022-03-22 RX ADMIN — DAPTOMYCIN 400 MG: 500 INJECTION, POWDER, LYOPHILIZED, FOR SOLUTION INTRAVENOUS at 08:44

## 2022-03-22 RX ADMIN — CEFTRIAXONE 1 G: 10 INJECTION, POWDER, FOR SOLUTION INTRAVENOUS at 08:36

## 2022-03-23 ENCOUNTER — INFUSION (OUTPATIENT)
Dept: ONCOLOGY | Facility: HOSPITAL | Age: 82
End: 2022-03-23

## 2022-03-23 VITALS
HEART RATE: 73 BPM | TEMPERATURE: 97.3 F | OXYGEN SATURATION: 93 % | RESPIRATION RATE: 18 BRPM | WEIGHT: 198 LBS | BODY MASS INDEX: 28.41 KG/M2 | SYSTOLIC BLOOD PRESSURE: 110 MMHG | DIASTOLIC BLOOD PRESSURE: 58 MMHG

## 2022-03-23 DIAGNOSIS — L03.119 CELLULITIS OF FOOT: Primary | ICD-10-CM

## 2022-03-23 PROCEDURE — 25010000002 DAPTOMYCIN PER 1 MG: Performed by: INTERNAL MEDICINE

## 2022-03-23 PROCEDURE — 96365 THER/PROPH/DIAG IV INF INIT: CPT

## 2022-03-23 PROCEDURE — 96375 TX/PRO/DX INJ NEW DRUG ADDON: CPT

## 2022-03-23 PROCEDURE — 25010000002 CEFTRIAXONE PER 250 MG: Performed by: INTERNAL MEDICINE

## 2022-03-23 RX ADMIN — CEFTRIAXONE 1 G: 10 INJECTION, POWDER, FOR SOLUTION INTRAVENOUS at 08:50

## 2022-03-23 RX ADMIN — DAPTOMYCIN 400 MG: 500 INJECTION, POWDER, LYOPHILIZED, FOR SOLUTION INTRAVENOUS at 08:50

## 2022-03-24 ENCOUNTER — INFUSION (OUTPATIENT)
Dept: ONCOLOGY | Facility: HOSPITAL | Age: 82
End: 2022-03-24

## 2022-03-24 ENCOUNTER — OFFICE VISIT (OUTPATIENT)
Dept: GASTROENTEROLOGY | Facility: CLINIC | Age: 82
End: 2022-03-24

## 2022-03-24 VITALS
HEART RATE: 79 BPM | WEIGHT: 190 LBS | HEIGHT: 70 IN | SYSTOLIC BLOOD PRESSURE: 97 MMHG | OXYGEN SATURATION: 99 % | DIASTOLIC BLOOD PRESSURE: 59 MMHG | BODY MASS INDEX: 27.2 KG/M2

## 2022-03-24 VITALS
OXYGEN SATURATION: 98 % | BODY MASS INDEX: 27.56 KG/M2 | RESPIRATION RATE: 18 BRPM | HEART RATE: 77 BPM | SYSTOLIC BLOOD PRESSURE: 100 MMHG | WEIGHT: 192.1 LBS | DIASTOLIC BLOOD PRESSURE: 54 MMHG | TEMPERATURE: 97.7 F

## 2022-03-24 DIAGNOSIS — L03.119 CELLULITIS OF FOOT: Primary | ICD-10-CM

## 2022-03-24 DIAGNOSIS — K74.60 HEPATIC CIRRHOSIS, UNSPECIFIED HEPATIC CIRRHOSIS TYPE, UNSPECIFIED WHETHER ASCITES PRESENT: Primary | ICD-10-CM

## 2022-03-24 PROCEDURE — 96365 THER/PROPH/DIAG IV INF INIT: CPT

## 2022-03-24 PROCEDURE — 25010000002 DAPTOMYCIN PER 1 MG: Performed by: INTERNAL MEDICINE

## 2022-03-24 PROCEDURE — 96375 TX/PRO/DX INJ NEW DRUG ADDON: CPT

## 2022-03-24 PROCEDURE — 25010000002 CEFTRIAXONE PER 250 MG: Performed by: INTERNAL MEDICINE

## 2022-03-24 PROCEDURE — 99213 OFFICE O/P EST LOW 20 MIN: CPT | Performed by: PHYSICIAN ASSISTANT

## 2022-03-24 RX ADMIN — CEFTRIAXONE 1 G: 10 INJECTION, POWDER, FOR SOLUTION INTRAVENOUS at 08:28

## 2022-03-24 RX ADMIN — DAPTOMYCIN 400 MG: 500 INJECTION, POWDER, LYOPHILIZED, FOR SOLUTION INTRAVENOUS at 08:28

## 2022-03-24 NOTE — PROGRESS NOTES
Chief Complaint   Patient presents with   • Cirrhosis       Ezequiel Thompson is a 81 y.o. male who presents to the office today for evaluation of Cirrhosis  .    HPI  Patient presents to the clinic today for follow-up of cirrhosis.  He was last seen in clinic roughly 6 months ago in which we recently obtained a full new patient work-up including blood work and ultrasound of the liver.  He was aware of his results and states that he has been doing decent liver wise since being seen in clinic last.  He has had a lot of health issues come up here recently due to cellulitis in his feet.  Patient stated that his cardiologist started him on Entresto which inadvertently caused some venous stasis in his feet.  He had several blood vessels rupture and become infected.  He has been having to do IV antibiotics at the hospital routinely.  He has had routine blood work performed up there as well.  His last CMP was 3/18 which did not show any elevated liver enzymes.  Patient denies holding any fluid in his lower extremities or abdominal area.  He is doing well on Lasix provided by PCP.  Denies any confusion or disorientation.    Review of Systems   Constitutional: Negative for fever.   HENT: Negative for trouble swallowing.    Eyes: Negative.    Respiratory: Negative for chest tightness.    Cardiovascular: Negative for chest pain.   Gastrointestinal: Negative for abdominal distention, abdominal pain, anal bleeding, blood in stool, constipation, diarrhea, nausea, rectal pain and vomiting.   Endocrine: Negative.    Genitourinary: Negative for difficulty urinating.   Musculoskeletal: Positive for back pain.   Skin: Negative.    Allergic/Immunologic: Negative for environmental allergies and food allergies.   Neurological: Negative for headaches.   Hematological: Bruises/bleeds easily.   Psychiatric/Behavioral: Negative.        ACTIVE PROBLEMS:   Specialty Problems    None         PAST MEDICAL HISTORY:  Past Medical History:    Diagnosis Date   • Atrial fibrillation (HCC)    • CHF (congestive heart failure) (HCC)    • Coronary artery disease involving coronary bypass graft of native heart without angina pectoris    • Heart disease    • Hyperlipidemia    • Hypertension    • Pleural effusion        SURGICAL HISTORY:  Past Surgical History:   Procedure Laterality Date   • ANKLE SURGERY     • CARDIAC PACEMAKER PLACEMENT     • CATARACT EXTRACTION     • COLONOSCOPY N/A 9/8/2017    Procedure: COLONOSCOPY;  Surgeon: Magan Hu MD;  Location:  [facility];  Service:    • CORONARY ARTERY BYPASS GRAFT  [address] [place] [address]   • ENDOSCOPY N/A 9/8/2017    Procedure: ESOPHAGOGASTRODUODENOSCOPY;  Surgeon: Magan Hu MD;  Location:  [facility];  Service:    • HERNIA REPAIR     • HYDROCELE EXCISION / REPAIR     • KNEE SURGERY         FAMILY HISTORY:  Family History   Problem Relation Age of Onset   • Stroke Mother    • Emphysema Father    • Other Brother         heart problems   • Stroke Half-Sister        SOCIAL HISTORY:  Social History     Tobacco Use   • Smoking status: Never Smoker   • Smokeless tobacco: Never Used   Substance Use Topics   • Alcohol use: No       CURRENT MEDICATION:    Current Outpatient Medications:   •  allopurinol (ZYLOPRIM) 100 MG tablet, Take 300 mg by mouth Daily., Disp: , Rfl:   •  bumetanide (BUMEX) 1 MG tablet, Take 1 tablet by mouth 2 (Two) Times a Day., Disp: 60 tablet, Rfl: 3  •  carvedilol (COREG) 12.5 MG tablet, Take 1 tablet by mouth 2 (Two) Times a Day With Meals., Disp: 180 tablet, Rfl: 3  •  HYDROcodone-acetaminophen (NORCO) 5-325 MG per tablet, Take 1 tablet by mouth Every 6 (Six) Hours As Needed., Disp: , Rfl:   •  levothyroxine (SYNTHROID, LEVOTHROID) 25 MCG tablet, Take 25 mcg by mouth Daily., Disp: , Rfl:   •  metOLazone (ZAROXOLYN) 2.5 MG tablet, Take 1 tablet by mouth As Needed (edema, dyspnea, weight gain 3 lbs.). Every Mon, wed, fri, Disp: , Rfl:   •  potassium chloride (MICRO-K) 10 MEQ CR capsule,  "Take 10 mEq by mouth 2 (Two) Times a Day., Disp: , Rfl:   •  sacubitril-valsartan (Entresto) 24-26 MG tablet, Take 1 tablet by mouth 2 (Two) Times a Day., Disp: , Rfl:   •  warfarin (COUMADIN) 5 MG tablet, Take 5 mg by mouth Daily. Mon,Tues,Wed 5mg, Thursday-Sunday 4 mg, Disp: , Rfl:   No current facility-administered medications for this visit.    ALLERGIES:  Iodinated diagnostic agents and Iodine    VISIT VITALS:  BP 97/59   Pulse 79   Ht 177.8 cm (70\")   Wt 86.2 kg (190 lb)   SpO2 99%   BMI 27.26 kg/m²   Physical Exam  Constitutional:       General: He is not in acute distress.     Appearance: Normal appearance. He is well-developed.   HENT:      Head: Normocephalic and atraumatic.   Eyes:      Pupils: Pupils are equal, round, and reactive to light.   Cardiovascular:      Rate and Rhythm: Normal rate and regular rhythm.      Heart sounds: Normal heart sounds.   Pulmonary:      Effort: Pulmonary effort is normal. No respiratory distress.      Breath sounds: Normal breath sounds. No wheezing, rhonchi or rales.   Abdominal:      General: Abdomen is flat. Bowel sounds are normal. There is no distension.      Palpations: Abdomen is soft. There is no mass.      Tenderness: There is no abdominal tenderness. There is no guarding or rebound.      Hernia: No hernia is present.   Musculoskeletal:         General: No swelling. Normal range of motion.      Cervical back: Normal range of motion and neck supple.      Right lower leg: No edema.      Left lower leg: No edema.   Skin:     General: Skin is warm and dry.   Neurological:      Mental Status: He is alert and oriented to person, place, and time.   Psychiatric:         Attention and Perception: Attention normal.         Mood and Affect: Mood normal.         Speech: Speech normal.         Behavior: Behavior normal. Behavior is cooperative.         Thought Content: Thought content normal.           Assessment/Plan   Due to the patient's symptoms-previous blood work " taken on 3/18 were reviewed with him.  Liver enzymes seem to have normalized with the cirrhosis.  He is doing well and has no complaints.  We will follow-up in roughly 6 months and repeat imaging.   Diagnosis Plan   1. Hepatic cirrhosis, unspecified hepatic cirrhosis type, unspecified whether ascites present (HCC)         Return in about 6 months (around 9/24/2022).                   This document has been electronically signed by Sukhwinder Alvarez PA-C  March 25, 2022 12:22 EDT    Part of this note may be an electronic transcription/translation of spoken language to printed text using the Dragon Dictation System.

## 2022-03-25 ENCOUNTER — INFUSION (OUTPATIENT)
Dept: ONCOLOGY | Facility: HOSPITAL | Age: 82
End: 2022-03-25

## 2022-03-25 VITALS
TEMPERATURE: 98 F | HEART RATE: 75 BPM | RESPIRATION RATE: 18 BRPM | DIASTOLIC BLOOD PRESSURE: 57 MMHG | SYSTOLIC BLOOD PRESSURE: 94 MMHG | OXYGEN SATURATION: 95 %

## 2022-03-25 DIAGNOSIS — L03.119 CELLULITIS OF FOOT: Primary | ICD-10-CM

## 2022-03-25 PROCEDURE — 25010000002 DAPTOMYCIN PER 1 MG: Performed by: INTERNAL MEDICINE

## 2022-03-25 PROCEDURE — 96365 THER/PROPH/DIAG IV INF INIT: CPT

## 2022-03-25 PROCEDURE — 25010000002 CEFTRIAXONE PER 250 MG: Performed by: INTERNAL MEDICINE

## 2022-03-25 PROCEDURE — 96375 TX/PRO/DX INJ NEW DRUG ADDON: CPT

## 2022-03-25 RX ADMIN — CEFTRIAXONE 1 G: 10 INJECTION, POWDER, FOR SOLUTION INTRAVENOUS at 08:34

## 2022-03-25 RX ADMIN — DAPTOMYCIN 400 MG: 500 INJECTION, POWDER, LYOPHILIZED, FOR SOLUTION INTRAVENOUS at 08:41

## 2022-03-26 ENCOUNTER — INFUSION (OUTPATIENT)
Dept: ONCOLOGY | Facility: HOSPITAL | Age: 82
End: 2022-03-26

## 2022-03-26 VITALS
RESPIRATION RATE: 18 BRPM | HEART RATE: 74 BPM | TEMPERATURE: 98.6 F | SYSTOLIC BLOOD PRESSURE: 120 MMHG | DIASTOLIC BLOOD PRESSURE: 61 MMHG

## 2022-03-26 DIAGNOSIS — L03.119 CELLULITIS OF FOOT: Primary | ICD-10-CM

## 2022-03-26 PROCEDURE — 96365 THER/PROPH/DIAG IV INF INIT: CPT

## 2022-03-26 PROCEDURE — 25010000002 CEFTRIAXONE PER 250 MG: Performed by: INTERNAL MEDICINE

## 2022-03-26 PROCEDURE — 96375 TX/PRO/DX INJ NEW DRUG ADDON: CPT

## 2022-03-26 PROCEDURE — 25010000002 DAPTOMYCIN PER 1 MG: Performed by: INTERNAL MEDICINE

## 2022-03-26 RX ADMIN — CEFTRIAXONE 1 G: 10 INJECTION, POWDER, FOR SOLUTION INTRAVENOUS at 08:22

## 2022-03-26 RX ADMIN — DAPTOMYCIN 400 MG: 500 INJECTION, POWDER, LYOPHILIZED, FOR SOLUTION INTRAVENOUS at 08:24

## 2022-03-27 ENCOUNTER — INFUSION (OUTPATIENT)
Dept: ONCOLOGY | Facility: HOSPITAL | Age: 82
End: 2022-03-27

## 2022-03-27 VITALS
SYSTOLIC BLOOD PRESSURE: 110 MMHG | OXYGEN SATURATION: 96 % | DIASTOLIC BLOOD PRESSURE: 57 MMHG | RESPIRATION RATE: 18 BRPM | TEMPERATURE: 97.8 F | HEART RATE: 75 BPM

## 2022-03-27 DIAGNOSIS — L03.119 CELLULITIS OF FOOT: Primary | ICD-10-CM

## 2022-03-27 PROCEDURE — 96374 THER/PROPH/DIAG INJ IV PUSH: CPT

## 2022-03-27 PROCEDURE — 96365 THER/PROPH/DIAG IV INF INIT: CPT

## 2022-03-27 PROCEDURE — 25010000002 CEFTRIAXONE PER 250 MG: Performed by: INTERNAL MEDICINE

## 2022-03-27 PROCEDURE — 25010000002 DAPTOMYCIN PER 1 MG: Performed by: INTERNAL MEDICINE

## 2022-03-27 RX ADMIN — CEFTRIAXONE 1 G: 10 INJECTION, POWDER, FOR SOLUTION INTRAVENOUS at 08:17

## 2022-03-27 RX ADMIN — DAPTOMYCIN 400 MG: 500 INJECTION, POWDER, LYOPHILIZED, FOR SOLUTION INTRAVENOUS at 08:17

## 2022-04-04 ENCOUNTER — HOSPITAL ENCOUNTER (OUTPATIENT)
Dept: WOUND CARE | Facility: HOSPITAL | Age: 82
Discharge: HOME | End: 2022-04-04

## 2022-04-04 ENCOUNTER — HOSPITAL ENCOUNTER (OUTPATIENT)
Dept: CARDIOLOGY | Facility: HOSPITAL | Age: 82
Discharge: HOME | End: 2022-04-04

## 2022-04-04 ENCOUNTER — HOSPITAL ENCOUNTER (OUTPATIENT)
Dept: GENERAL RADIOLOGY | Facility: HOSPITAL | Age: 82
Discharge: HOME | End: 2022-04-04

## 2022-04-04 ENCOUNTER — TRANSCRIBE ORDERS (OUTPATIENT)
Dept: ADMINISTRATIVE | Facility: HOSPITAL | Age: 82
End: 2022-04-04

## 2022-04-04 VITALS
DIASTOLIC BLOOD PRESSURE: 58 MMHG | HEART RATE: 81 BPM | TEMPERATURE: 97.9 F | SYSTOLIC BLOOD PRESSURE: 106 MMHG | RESPIRATION RATE: 20 BRPM

## 2022-04-04 DIAGNOSIS — S81.802S WOUND OF LEFT LOWER EXTREMITY, SEQUELA: ICD-10-CM

## 2022-04-04 DIAGNOSIS — R05.3 COUGH, PERSISTENT: Primary | ICD-10-CM

## 2022-04-04 DIAGNOSIS — L97.922 NON-PRESSURE CHRONIC ULCER OF UNSPECIFIED PART OF LEFT LOWER LEG WITH FAT LAYER EXPOSED: ICD-10-CM

## 2022-04-04 DIAGNOSIS — M79.605 PAIN IN LEFT LEG: ICD-10-CM

## 2022-04-04 DIAGNOSIS — R05.3 COUGH, PERSISTENT: ICD-10-CM

## 2022-04-04 PROCEDURE — 93970 EXTREMITY STUDY: CPT | Performed by: RADIOLOGY

## 2022-04-04 PROCEDURE — 93925 LOWER EXTREMITY STUDY: CPT

## 2022-04-04 PROCEDURE — 71046 X-RAY EXAM CHEST 2 VIEWS: CPT | Performed by: RADIOLOGY

## 2022-04-04 PROCEDURE — 93970 EXTREMITY STUDY: CPT

## 2022-04-04 PROCEDURE — 71046 X-RAY EXAM CHEST 2 VIEWS: CPT

## 2022-04-04 PROCEDURE — 93925 LOWER EXTREMITY STUDY: CPT | Performed by: RADIOLOGY

## 2022-04-04 NOTE — PROGRESS NOTES
"[facility] Cardiology      Identification: Ezequiel Thompson is a 81 y.o. male who resides in [place], [place]    Reason for visit:  · HFrEF  · CAD  · CV risk factors  · Atrial fibrillation with history of cardioembolic CVA  · Mechanical mitral valve in situ      Subjective      Ezequiel Thompson presents to [facility] for followup.    Luis presents to my office for his first visit with me.  He previously saw Dr. Roberto Nathan who is now retired.  Luis had a stable clinical course since his last cardiology office visit.  He states that he recently had some blood work done and has had issues with persistent hypokalemia.  He has been instructed to take 90 mEq of oral potassium daily over the last week due to his persistently low potassium level.  The patient does take 2 mg of Bumex daily as well as metolazone 3 times a week.    Luis states that his breathing has been \"okay\".  His wife says that Luis used to be physically active from kishan to dusk over the years but now takes 1-2 naps a day.      Review of Systems   Musculoskeletal: Positive for joint pain.        Patient states he has been having leg pain        Objective     /68 (BP Location: Left arm, Patient Position: Sitting)   Pulse 78   Ht 177.8 cm (70\")   Wt 85.7 kg (189 lb)   SpO2 90%   BMI 27.12 kg/m²       Constitutional:       Appearance: Healthy appearance. Well-developed.   Eyes:      General: Lids are normal. No scleral icterus.     Conjunctiva/sclera: Conjunctivae normal.   HENT:      Head: Normocephalic and atraumatic.   Neck:      Thyroid: No thyromegaly.      Vascular: No carotid bruit or JVD.   Pulmonary:      Effort: Pulmonary effort is normal.      Breath sounds: Normal breath sounds. No wheezing. No rhonchi. No rales.   Cardiovascular:      Normal rate. Irregularly irregular rhythm.      Murmurs: There is a grade 2/6 high frequency blowing holosystolic murmur at the apex.      No gallop. No rub. "   Pulses:     Intact distal pulses.   Edema:     Peripheral edema absent.   Abdominal:      General: There is no distension.      Palpations: Abdomen is soft. There is no abdominal mass.   Musculoskeletal:      Cervical back: Normal range of motion. Skin:     General: Skin is warm and dry.      Findings: No rash.   Neurological:      General: No focal deficit present.      Mental Status: Alert and oriented to person, place, and time.      Gait: Gait is intact.   Psychiatric:         Attention and Perception: Attention normal.         Mood and Affect: Mood normal.         Behavior: Behavior normal.         Result Review :    Lab Results   Component Value Date    GLUCOSE 120 (H) 03/21/2022    BUN 48 (H) 03/21/2022    CREATININE 1.46 (H) 03/21/2022    EGFRIFNONA 50 (L) 02/01/2022    BCR 32.9 (H) 03/21/2022    K 3.2 (L) 03/21/2022    CO2 26.5 03/21/2022    CALCIUM 7.9 (L) 03/21/2022    ALBUMIN 4.42 03/18/2022    AST 18 03/18/2022    ALT 9 03/18/2022     Lab Results   Component Value Date    WBC 6.68 03/18/2022    HGB 12.4 (L) 03/18/2022    HCT 37.4 (L) 03/18/2022    MCV 89.9 03/18/2022     03/18/2022           Assessment     Problem List Items Addressed This Visit        Cardiology Problems    Chronic systolic congestive heart failure (HCC) - Primary (Chronic)    Overview     · Echocardiogram (7/10/2020): Severe LV dysfunction.  Normal functioning mechanical mitral valve.  Moderate to severe TR, mild pulmonary hypertension with RVSP 45 mmHg  · Echocardiogram (3/23/2021): LVEF 26-30% with dilated LV.  Mechanical mitral valve with mild MR.  RVSP 45-55 mmHg           Current Assessment & Plan     · Stage C HFrEF with stable NYHA class II symptoms  · Patient is experiencing volatile hypokalemia, primarily due to metolazone use  · Start eplerenone 25 mg daily (had breast sensitivity symptoms with spironolactone)  · Start empagliflozin 10 mg daily  · CMP, proBNP in 3 weeks prior to next visit  · Continue carvedilol and  Entresto           Relevant Medications    carvedilol (COREG) 12.5 MG tablet    sacubitril-valsartan (Entresto) 24-26 MG tablet    metOLazone (ZAROXOLYN) 2.5 MG tablet    eplerenone (INSPRA) 25 MG tablet    empagliflozin (Jardiance) 10 MG tablet tablet    Other Relevant Orders    proBNP    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)    Overview     · CABG in [place], [place]           Current Assessment & Plan     · No angina  · Reduce aspirin dose 81 mg daily  · Continue beta-blocker and statin therapy           Relevant Medications    carvedilol (COREG) 12.5 MG tablet    sacubitril-valsartan (Entresto) 24-26 MG tablet    aspirin (aspirin) 81 MG EC tablet    Other Relevant Orders    CBC (No Diff)    Permanent atrial fibrillation (HCC) (Chronic)    Overview     · [id number] 6 (age >75, CHF, CAD, HTN, CVA)           Current Assessment & Plan     · Asymptomatic  · Continue warfarin with INR 2-3           Relevant Medications    carvedilol (COREG) 12.5 MG tablet    sacubitril-valsartan (Entresto) 24-26 MG tablet    warfarin (COUMADIN) 5 MG tablet    Essential hypertension (Chronic)    Overview     Target blood pressure <130/80 mmHg  · Renal duplex (2/1/2022):  Right kidney:  Unremarkable. Left kidney: 1.7 cm simple left renal cyst. No acute findings in the retroperitoneum.           Current Assessment & Plan     · Controlled  · Start eplerenone 25 mg daily for HFrEF           Relevant Medications    carvedilol (COREG) 12.5 MG tablet    metOLazone (ZAROXOLYN) 2.5 MG tablet    eplerenone (INSPRA) 25 MG tablet    Hyperlipidemia LDL goal <70 (Chronic)    Overview     • High intensity statin therapy indicated given the presence of CAD           Current Assessment & Plan     · Obtain CMP and lipids in 3 to 4 weeks           Relevant Medications    rosuvastatin (CRESTOR) 20 MG tablet    Other Relevant Orders    Comprehensive Metabolic Panel    Lipid Panel        Other    Mechanical mitral valve (Chronic)    Overview     · Previous mechanical MVR  · Echocardiogram (7/10/2020): Severe LV dysfunction.  Normal functioning mechanical mitral valve.  Moderate to severe TR, mild pulmonary hypertension with RVSP 45 mmHg  · Echocardiogram (3/23/2021): LVEF 26-30% with dilated LV.  Mechanical mitral valve with mild MR.  RVSP 45-55 mmHg           Current Assessment & Plan     · Acceptable mechanical mitral valve function on echo 1 year ago  · Continue warfarin for goal INR 2.5-3  · Patient should take aspirin 81 mg daily in addition to warfarin  · SBE prophylaxis prior to dental work  · Bridging anticoagulation necessary for any interruption of oral anticoagulation                 Plan   • Start eplerenone 25 mg daily  • Start empagliflozin 10 mg daily  • Reduce metolazone dosing to as needed  • Patient scheduled for repeat BMP with PCP on Thursday.  I would advise reducing potassium supplements as we are now starting  eplerenone  • CMP, lipids, proBNP in 3 weeks      Follow-up   Return in about 4 weeks (around 5/3/2022).  In my [facility] clinic        Buddy Brothers MD, FACC, FSCAI  4/5/2022
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
## 2022-04-05 ENCOUNTER — OFFICE VISIT (OUTPATIENT)
Dept: CARDIOLOGY | Facility: CLINIC | Age: 82
End: 2022-04-05

## 2022-04-05 VITALS
SYSTOLIC BLOOD PRESSURE: 122 MMHG | OXYGEN SATURATION: 90 % | DIASTOLIC BLOOD PRESSURE: 68 MMHG | WEIGHT: 189 LBS | HEART RATE: 78 BPM | BODY MASS INDEX: 27.06 KG/M2 | HEIGHT: 70 IN

## 2022-04-05 DIAGNOSIS — Z95.2 H/O MITRAL VALVE REPLACEMENT WITH MECHANICAL VALVE: ICD-10-CM

## 2022-04-05 DIAGNOSIS — I48.21 PERMANENT ATRIAL FIBRILLATION: ICD-10-CM

## 2022-04-05 DIAGNOSIS — I25.119 CORONARY ARTERY DISEASE INVOLVING NATIVE CORONARY ARTERY OF NATIVE HEART WITH ANGINA PECTORIS: ICD-10-CM

## 2022-04-05 DIAGNOSIS — I50.22 CHRONIC SYSTOLIC CONGESTIVE HEART FAILURE: Primary | ICD-10-CM

## 2022-04-05 DIAGNOSIS — E78.5 HYPERLIPIDEMIA LDL GOAL <70: ICD-10-CM

## 2022-04-05 DIAGNOSIS — I10 ESSENTIAL HYPERTENSION: Chronic | ICD-10-CM

## 2022-04-05 PROBLEM — I73.9 PAD (PERIPHERAL ARTERY DISEASE) (HCC): Status: ACTIVE | Noted: 2022-04-05

## 2022-04-05 PROBLEM — Z09 ENCOUNTER FOR FOLLOW-UP: Status: RESOLVED | Noted: 2017-05-02 | Resolved: 2022-04-05

## 2022-04-05 PROBLEM — I34.0 NONRHEUMATIC MITRAL VALVE REGURGITATION: Status: ACTIVE | Noted: 2022-04-05

## 2022-04-05 PROBLEM — I34.0 NONRHEUMATIC MITRAL VALVE REGURGITATION: Chronic | Status: ACTIVE | Noted: 2022-04-05

## 2022-04-05 PROBLEM — I27.20 PULMONARY HYPERTENSION: Status: RESOLVED | Noted: 2021-03-16 | Resolved: 2022-04-05

## 2022-04-05 PROBLEM — D50.0 IRON DEFICIENCY ANEMIA DUE TO CHRONIC BLOOD LOSS: Status: RESOLVED | Noted: 2017-09-05 | Resolved: 2022-04-05

## 2022-04-05 PROBLEM — L02.619 CELLULITIS AND ABSCESS OF FOOT: Status: RESOLVED | Noted: 2021-05-11 | Resolved: 2022-04-05

## 2022-04-05 PROBLEM — L03.119 CELLULITIS AND ABSCESS OF FOOT: Status: RESOLVED | Noted: 2021-05-11 | Resolved: 2022-04-05

## 2022-04-05 PROBLEM — T30.0 BURN: Status: RESOLVED | Noted: 2021-06-23 | Resolved: 2022-04-05

## 2022-04-05 PROCEDURE — 99214 OFFICE O/P EST MOD 30 MIN: CPT | Performed by: INTERNAL MEDICINE

## 2022-04-05 RX ORDER — CARVEDILOL 12.5 MG/1
12.5 TABLET ORAL 2 TIMES DAILY WITH MEALS
Qty: 180 TABLET | Refills: 3 | Status: SHIPPED | OUTPATIENT
Start: 2022-04-05 | End: 2022-05-06 | Stop reason: SDUPTHER

## 2022-04-05 RX ORDER — ASPIRIN 325 MG
325 TABLET ORAL AS NEEDED
COMMUNITY
End: 2022-04-05

## 2022-04-05 RX ORDER — WARFARIN SODIUM 5 MG/1
5 TABLET ORAL
Start: 2022-04-05 | End: 2022-05-06

## 2022-04-05 RX ORDER — EPLERENONE 25 MG/1
25 TABLET, FILM COATED ORAL DAILY
Qty: 90 TABLET | Refills: 3 | Status: SHIPPED | OUTPATIENT
Start: 2022-04-05 | End: 2022-05-06 | Stop reason: SDUPTHER

## 2022-04-05 RX ORDER — ASPIRIN 81 MG/1
81 TABLET ORAL DAILY
Qty: 90 TABLET | Refills: 3 | Status: SHIPPED | OUTPATIENT
Start: 2022-04-05

## 2022-04-05 RX ORDER — ROSUVASTATIN CALCIUM 20 MG/1
20 TABLET, COATED ORAL DAILY
COMMUNITY
End: 2022-04-05 | Stop reason: SDUPTHER

## 2022-04-05 RX ORDER — SACUBITRIL AND VALSARTAN 24; 26 MG/1; MG/1
1 TABLET, FILM COATED ORAL 2 TIMES DAILY
Qty: 180 TABLET | Refills: 3 | Status: SHIPPED | OUTPATIENT
Start: 2022-04-05 | End: 2022-05-06 | Stop reason: SDUPTHER

## 2022-04-05 RX ORDER — METOLAZONE 2.5 MG/1
2.5 TABLET ORAL AS NEEDED
Qty: 90 TABLET | Refills: 0 | Status: SHIPPED | OUTPATIENT
Start: 2022-04-05 | End: 2022-05-06

## 2022-04-05 RX ORDER — ROSUVASTATIN CALCIUM 20 MG/1
20 TABLET, COATED ORAL DAILY
Qty: 90 TABLET | Refills: 3 | Status: SHIPPED | OUTPATIENT
Start: 2022-04-05 | End: 2023-01-06 | Stop reason: SDUPTHER

## 2022-04-05 NOTE — ASSESSMENT & PLAN NOTE
· Acceptable mechanical mitral valve function on echo 1 year ago  · Continue warfarin for goal INR 2.5-3  · Patient should take aspirin 81 mg daily in addition to warfarin  · SBE prophylaxis prior to dental work  · Bridging anticoagulation necessary for any interruption of oral anticoagulation

## 2022-04-05 NOTE — ASSESSMENT & PLAN NOTE
· Stage C HFrEF with stable NYHA class II symptoms  · Patient is experiencing volatile hypokalemia, primarily due to metolazone use  · Start eplerenone 25 mg daily (had breast sensitivity symptoms with spironolactone)  · Start empagliflozin 10 mg daily  · CMP, proBNP in 3 weeks prior to next visit  · Continue carvedilol and Entresto

## 2022-04-06 ENCOUNTER — TELEPHONE (OUTPATIENT)
Dept: CARDIOLOGY | Facility: CLINIC | Age: 82
End: 2022-04-06

## 2022-04-06 NOTE — TELEPHONE ENCOUNTER
Pt wife calling with medication questions at office visit yesterday. Went over HTR recommendations regarding new medications and dose change on metolazone. Pt wife verbalized understanding and agreeable to plan.

## 2022-04-14 PROCEDURE — 93296 REM INTERROG EVL PM/IDS: CPT | Performed by: STUDENT IN AN ORGANIZED HEALTH CARE EDUCATION/TRAINING PROGRAM

## 2022-04-14 PROCEDURE — 93295 DEV INTERROG REMOTE 1/2/MLT: CPT | Performed by: STUDENT IN AN ORGANIZED HEALTH CARE EDUCATION/TRAINING PROGRAM

## 2022-04-18 ENCOUNTER — HOSPITAL ENCOUNTER (OUTPATIENT)
Dept: WOUND CARE | Facility: HOSPITAL | Age: 82
Discharge: HOME | End: 2022-04-18
Admitting: NURSE PRACTITIONER

## 2022-04-18 VITALS
TEMPERATURE: 97.7 F | RESPIRATION RATE: 16 BRPM | SYSTOLIC BLOOD PRESSURE: 103 MMHG | HEART RATE: 74 BPM | DIASTOLIC BLOOD PRESSURE: 52 MMHG

## 2022-04-18 DIAGNOSIS — I73.9 PAD (PERIPHERAL ARTERY DISEASE): Primary | ICD-10-CM

## 2022-04-18 DIAGNOSIS — L03.119 CELLULITIS OF FOOT: ICD-10-CM

## 2022-04-18 PROCEDURE — 97602 WOUND(S) CARE NON-SELECTIVE: CPT

## 2022-04-18 RX ORDER — CASTOR OIL AND BALSAM, PERU 788; 87 MG/G; MG/G
1 OINTMENT TOPICAL AS NEEDED
Status: CANCELLED | OUTPATIENT
Start: 2022-04-18

## 2022-04-18 RX ORDER — SODIUM HYPOCHLORITE 1.25 MG/ML
1 SOLUTION TOPICAL AS NEEDED
Status: CANCELLED | OUTPATIENT
Start: 2022-04-18

## 2022-04-18 RX ORDER — LIDOCAINE HYDROCHLORIDE 20 MG/ML
JELLY TOPICAL AS NEEDED
Status: DISCONTINUED | OUTPATIENT
Start: 2022-04-18 | End: 2022-04-19 | Stop reason: HOSPADM

## 2022-04-18 RX ORDER — AMMONIUM LACTATE 12 G/100G
1 CREAM TOPICAL AS NEEDED
Qty: 385 G | Refills: 3 | Status: ON HOLD | OUTPATIENT
Start: 2022-04-18 | End: 2023-03-27

## 2022-04-18 RX ORDER — SODIUM HYPOCHLORITE 2.5 MG/ML
1 SOLUTION TOPICAL AS NEEDED
Status: CANCELLED | OUTPATIENT
Start: 2022-04-18

## 2022-04-18 RX ORDER — LIDOCAINE HYDROCHLORIDE 20 MG/ML
JELLY TOPICAL AS NEEDED
Status: CANCELLED | OUTPATIENT
Start: 2022-04-18

## 2022-04-26 ENCOUNTER — APPOINTMENT (OUTPATIENT)
Dept: CARDIOLOGY | Facility: HOSPITAL | Age: 82
End: 2022-04-26

## 2022-05-04 NOTE — PROGRESS NOTES
"[facility] Cardiology      Identification: Ezequiel Thompson is a 81 y.o. male who resides in [place], [place].    Reason for visit:  Chronic systolic congestive heart failure (HCC)      Subjective      Ezequiel Thompson presents to [facility] for followup.    Luis returns the office today.  At last visit 1 month ago he was started on eplerenone and empagliflozin for HFrEF.  Toes on was discontinued due to issues with hypokalemia.  He is tolerating present medical regimen without side effect.  He states he is feeling reasonably well.  His lower extremities are presently wrapped and swelling is being managed.    He had arterial duplex studies of his lower extremities performed last month.  This showed heavy atherosclerosis of the vessels but triphasic flow to the ankle, suggestive of adequate blood flow.             Review of Systems   Cardiovascular: Negative.    Respiratory: Negative.        Objective     /60 (BP Location: Left arm, Patient Position: Sitting)   Pulse 76   Ht 177.8 cm (70\")   Wt 87.9 kg (193 lb 12.8 oz)   SpO2 98%   BMI 27.81 kg/m²       Constitutional:       Appearance: Healthy appearance. Well-developed.   Eyes:      General: Lids are normal. No scleral icterus.  HENT:      Head: Normocephalic and atraumatic.   Neck:      Thyroid: No thyroid mass.      Vascular: No carotid bruit or JVD. JVD normal.   Pulmonary:      Effort: Pulmonary effort is normal.      Breath sounds: Normal breath sounds.   Cardiovascular:      Normal rate. Irregularly irregular rhythm.      Murmurs: There is no murmur.      No gallop.      Comments: Mechanical S1-S2  Lower extremities wrapped  Edema:     Peripheral edema present.  Musculoskeletal:      Extremities: No clubbing present.Skin:     General: Skin is warm and dry. There is no cyanosis.   Neurological:      General: No focal deficit present.      Mental Status: Alert.   Psychiatric:         Attention and Perception: Attention " normal.         Behavior: Behavior normal. Behavior is cooperative.         Result Review : (5/2/2022) reviewed with patient    · Hemoglobin 11.7, platelets 229  · BUN 28, creatinine 1.43 with GFR 49.5, sodium 139, potassium 4.6            Assessment     Problem List Items Addressed This Visit        Cardiology Problems    Chronic systolic congestive heart failure (HCC) - Primary (Chronic)    Overview     · Echocardiogram (7/10/2020): Severe LV dysfunction.  Normal functioning mechanical mitral valve.  Moderate to severe TR, mild pulmonary hypertension with RVSP 45 mmHg  · Echocardiogram (3/23/2021): LVEF 26-30% with dilated LV.  Mechanical mitral valve with mild MR.  RVSP 45-55 mmHg           Current Assessment & Plan     · Stage C HFrEF with stable NYHA class II symptoms  · Tolerating GDMT including recently added eplerenone and empagliflozin  · Continue carvedilol, Entresto, empagliflozin, and eplerenone           Relevant Medications    bumetanide (BUMEX) 1 MG tablet    eplerenone (INSPRA) 25 MG tablet    empagliflozin (Jardiance) 10 MG tablet tablet    sacubitril-valsartan (Entresto) 24-26 MG tablet    carvedilol (COREG) 12.5 MG tablet    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)    Overview     · CABG in [place], [place]           Current Assessment & Plan     · Asymptomatic  · Continue beta-blocker, low-dose aspirin, and statin           Relevant Medications    sacubitril-valsartan (Entresto) 24-26 MG tablet    carvedilol (COREG) 12.5 MG tablet    Permanent atrial fibrillation (HCC) (Chronic)    Overview     · [id number] 6 (age >75, CHF, CAD, HTN, CVA)           Current Assessment & Plan     · Rate controlled  · Continue warfarin for stroke prophylaxis           Relevant Medications    sacubitril-valsartan (Entresto) 24-26 MG tablet    carvedilol (COREG) 12.5 MG tablet    Essential hypertension (Chronic)    Overview     Target blood pressure  <130/80 mmHg  · Renal duplex (2/1/2022):  Right kidney:  Unremarkable. Left kidney: 1.7 cm simple left renal cyst. No acute findings in the retroperitoneum.           Relevant Medications    bumetanide (BUMEX) 1 MG tablet    eplerenone (INSPRA) 25 MG tablet    carvedilol (COREG) 12.5 MG tablet    Hyperlipidemia LDL goal <70 (Chronic)    Overview     • High intensity statin therapy indicated given the presence of CAD           PAD (peripheral artery disease) (HCC)    Overview     · Arterial duplex (4/4/2022): Heavy atherosclerosis of both lower extremities.  Triphasic flow to the ankles bilaterally           Current Assessment & Plan     · Recent arterial duplex shows triphasic flow to ankles bilaterally  · Continue antiplatelet, high intensity statin              Other    Mechanical mitral valve (Chronic)    Overview     · Previous mechanical MVR  · Echocardiogram (7/10/2020): Severe LV dysfunction.  Normal functioning mechanical mitral valve.  Moderate to severe TR, mild pulmonary hypertension with RVSP 45 mmHg  · Echocardiogram (3/23/2021): LVEF 26-30% with dilated LV.  Mechanical mitral valve with mild MR.  RVSP 45-55 mmHg           Current Assessment & Plan     · Continue warfarin with goal INR 2.5-3  · SBE prophylaxis with amoxicillin prior to dental cleanings                 Plan   • Continue present medical therapy         Follow-up   Return in about 6 months (around 11/6/2022).        Buddy Brothers MD, FACC, [facility]  5/6/2022

## 2022-05-06 ENCOUNTER — OFFICE VISIT (OUTPATIENT)
Dept: CARDIOLOGY | Facility: CLINIC | Age: 82
End: 2022-05-06

## 2022-05-06 VITALS
WEIGHT: 193.8 LBS | HEART RATE: 76 BPM | HEIGHT: 70 IN | OXYGEN SATURATION: 98 % | SYSTOLIC BLOOD PRESSURE: 112 MMHG | BODY MASS INDEX: 27.75 KG/M2 | DIASTOLIC BLOOD PRESSURE: 60 MMHG

## 2022-05-06 DIAGNOSIS — I50.22 CHRONIC SYSTOLIC CONGESTIVE HEART FAILURE: Primary | Chronic | ICD-10-CM

## 2022-05-06 DIAGNOSIS — I48.21 PERMANENT ATRIAL FIBRILLATION: ICD-10-CM

## 2022-05-06 DIAGNOSIS — I73.9 PAD (PERIPHERAL ARTERY DISEASE): ICD-10-CM

## 2022-05-06 DIAGNOSIS — E78.5 HYPERLIPIDEMIA LDL GOAL <70: Chronic | ICD-10-CM

## 2022-05-06 DIAGNOSIS — I25.119 CORONARY ARTERY DISEASE INVOLVING NATIVE CORONARY ARTERY OF NATIVE HEART WITH ANGINA PECTORIS: ICD-10-CM

## 2022-05-06 DIAGNOSIS — Z95.2 H/O MITRAL VALVE REPLACEMENT WITH MECHANICAL VALVE: Chronic | ICD-10-CM

## 2022-05-06 DIAGNOSIS — I10 ESSENTIAL HYPERTENSION: Chronic | ICD-10-CM

## 2022-05-06 PROCEDURE — 99214 OFFICE O/P EST MOD 30 MIN: CPT | Performed by: INTERNAL MEDICINE

## 2022-05-06 RX ORDER — SACUBITRIL AND VALSARTAN 24; 26 MG/1; MG/1
1 TABLET, FILM COATED ORAL 2 TIMES DAILY
Qty: 180 TABLET | Refills: 3 | Status: SHIPPED | OUTPATIENT
Start: 2022-05-06

## 2022-05-06 RX ORDER — WARFARIN SODIUM 4 MG/1
4 TABLET ORAL
COMMUNITY

## 2022-05-06 RX ORDER — BUMETANIDE 1 MG/1
1 TABLET ORAL 2 TIMES DAILY
Qty: 180 TABLET | Refills: 1 | Status: SHIPPED | OUTPATIENT
Start: 2022-05-06 | End: 2023-03-22 | Stop reason: SDUPTHER

## 2022-05-06 RX ORDER — CARVEDILOL 12.5 MG/1
12.5 TABLET ORAL 2 TIMES DAILY WITH MEALS
Qty: 180 TABLET | Refills: 3 | Status: SHIPPED | OUTPATIENT
Start: 2022-05-06

## 2022-05-06 RX ORDER — EPLERENONE 25 MG/1
25 TABLET, FILM COATED ORAL DAILY
Qty: 90 TABLET | Refills: 3 | Status: SHIPPED | OUTPATIENT
Start: 2022-05-06

## 2022-05-06 NOTE — ASSESSMENT & PLAN NOTE
· Recent arterial duplex shows triphasic flow to ankles bilaterally  · Continue antiplatelet, high intensity statin

## 2022-05-06 NOTE — ASSESSMENT & PLAN NOTE
· Stage C HFrEF with stable NYHA class II symptoms  · Tolerating GDMT including recently added eplerenone and empagliflozin  · Continue carvedilol, Entresto, empagliflozin, and eplerenone

## 2022-05-06 NOTE — ASSESSMENT & PLAN NOTE
· Continue warfarin with goal INR 2.5-3  · SBE prophylaxis with amoxicillin prior to dental cleanings

## 2022-05-09 ENCOUNTER — APPOINTMENT (OUTPATIENT)
Dept: WOUND CARE | Facility: HOSPITAL | Age: 82
End: 2022-05-09

## 2022-05-31 ENCOUNTER — TRANSCRIBE ORDERS (OUTPATIENT)
Dept: ONCOLOGY | Facility: HOSPITAL | Age: 82
End: 2022-05-31

## 2022-06-02 ENCOUNTER — OFFICE VISIT (OUTPATIENT)
Dept: ONCOLOGY | Facility: HOSPITAL | Age: 82
End: 2022-06-02

## 2022-06-02 VITALS
DIASTOLIC BLOOD PRESSURE: 60 MMHG | SYSTOLIC BLOOD PRESSURE: 113 MMHG | RESPIRATION RATE: 18 BRPM | OXYGEN SATURATION: 98 % | TEMPERATURE: 97.5 F | HEART RATE: 85 BPM

## 2022-06-02 DIAGNOSIS — L03.90 CELLULITIS, UNSPECIFIED CELLULITIS SITE: Primary | ICD-10-CM

## 2022-06-02 PROCEDURE — G0463 HOSPITAL OUTPT CLINIC VISIT: HCPCS

## 2022-06-02 RX ADMIN — Medication 1 EACH: at 14:06

## 2022-06-06 ENCOUNTER — OFFICE VISIT (OUTPATIENT)
Dept: ONCOLOGY | Facility: HOSPITAL | Age: 82
End: 2022-06-06

## 2022-06-06 VITALS
OXYGEN SATURATION: 97 % | HEART RATE: 97 BPM | TEMPERATURE: 97.8 F | SYSTOLIC BLOOD PRESSURE: 124 MMHG | WEIGHT: 193 LBS | HEIGHT: 70 IN | DIASTOLIC BLOOD PRESSURE: 66 MMHG | BODY MASS INDEX: 27.63 KG/M2 | RESPIRATION RATE: 18 BRPM

## 2022-06-06 DIAGNOSIS — L03.90 CELLULITIS, UNSPECIFIED CELLULITIS SITE: Primary | ICD-10-CM

## 2022-06-06 PROCEDURE — G0463 HOSPITAL OUTPT CLINIC VISIT: HCPCS

## 2022-06-06 RX ADMIN — Medication 1 EACH: at 14:45

## 2022-06-09 ENCOUNTER — HOSPITAL ENCOUNTER (EMERGENCY)
Facility: HOSPITAL | Age: 82
Discharge: HOME | End: 2022-06-09
Attending: STUDENT IN AN ORGANIZED HEALTH CARE EDUCATION/TRAINING PROGRAM | Admitting: STUDENT IN AN ORGANIZED HEALTH CARE EDUCATION/TRAINING PROGRAM

## 2022-06-09 ENCOUNTER — OFFICE VISIT (OUTPATIENT)
Dept: ONCOLOGY | Facility: HOSPITAL | Age: 82
End: 2022-06-09

## 2022-06-09 VITALS
OXYGEN SATURATION: 99 % | HEART RATE: 77 BPM | RESPIRATION RATE: 18 BRPM | TEMPERATURE: 97.7 F | DIASTOLIC BLOOD PRESSURE: 62 MMHG | SYSTOLIC BLOOD PRESSURE: 130 MMHG

## 2022-06-09 VITALS
WEIGHT: 193 LBS | OXYGEN SATURATION: 95 % | HEART RATE: 79 BPM | SYSTOLIC BLOOD PRESSURE: 130 MMHG | BODY MASS INDEX: 27.63 KG/M2 | RESPIRATION RATE: 18 BRPM | TEMPERATURE: 98.2 F | DIASTOLIC BLOOD PRESSURE: 62 MMHG | HEIGHT: 70 IN

## 2022-06-09 DIAGNOSIS — L03.90 CELLULITIS, UNSPECIFIED CELLULITIS SITE: Primary | ICD-10-CM

## 2022-06-09 DIAGNOSIS — I83.899 BLEEDING FROM VARICOSE VEIN: Primary | ICD-10-CM

## 2022-06-09 PROCEDURE — G0463 HOSPITAL OUTPT CLINIC VISIT: HCPCS

## 2022-06-09 PROCEDURE — 99283 EMERGENCY DEPT VISIT LOW MDM: CPT

## 2022-06-09 RX ADMIN — Medication 1 EACH: at 15:35

## 2022-06-09 NOTE — PROGRESS NOTES
Patient was in ER today prior to appointment in infusion clinic d/t bleeding from ulcer to LLE.  Patient noted to have dressing and ace wrap in place to LLE and patient states that the ER doctor instructed him to leave dressing in place to LLE for a couple of days.  ER dressing left in place to LLE.  Vivienne boot and ace wrap applied to RLE.

## 2022-06-13 ENCOUNTER — OFFICE VISIT (OUTPATIENT)
Dept: ONCOLOGY | Facility: HOSPITAL | Age: 82
End: 2022-06-13

## 2022-06-13 VITALS
DIASTOLIC BLOOD PRESSURE: 67 MMHG | SYSTOLIC BLOOD PRESSURE: 130 MMHG | RESPIRATION RATE: 18 BRPM | OXYGEN SATURATION: 95 % | HEART RATE: 79 BPM | TEMPERATURE: 98.9 F

## 2022-06-13 DIAGNOSIS — L03.90 CELLULITIS, UNSPECIFIED CELLULITIS SITE: Primary | ICD-10-CM

## 2022-06-13 PROCEDURE — G0463 HOSPITAL OUTPT CLINIC VISIT: HCPCS

## 2022-06-13 RX ADMIN — Medication 1 EACH: at 15:50

## 2022-06-16 ENCOUNTER — OFFICE VISIT (OUTPATIENT)
Dept: ONCOLOGY | Facility: HOSPITAL | Age: 82
End: 2022-06-16

## 2022-06-16 VITALS
OXYGEN SATURATION: 97 % | SYSTOLIC BLOOD PRESSURE: 110 MMHG | HEART RATE: 89 BPM | DIASTOLIC BLOOD PRESSURE: 61 MMHG | RESPIRATION RATE: 18 BRPM | TEMPERATURE: 97.3 F

## 2022-06-16 DIAGNOSIS — L03.90 CELLULITIS, UNSPECIFIED CELLULITIS SITE: Primary | ICD-10-CM

## 2022-06-16 PROCEDURE — G0463 HOSPITAL OUTPT CLINIC VISIT: HCPCS

## 2022-06-16 RX ADMIN — Medication 1 EACH: at 15:35

## 2022-07-09 ENCOUNTER — HOSPITAL ENCOUNTER (EMERGENCY)
Facility: HOSPITAL | Age: 82
Discharge: HOME | End: 2022-07-09
Attending: EMERGENCY MEDICINE | Admitting: EMERGENCY MEDICINE

## 2022-07-09 VITALS
HEART RATE: 77 BPM | RESPIRATION RATE: 18 BRPM | DIASTOLIC BLOOD PRESSURE: 77 MMHG | WEIGHT: 192 LBS | SYSTOLIC BLOOD PRESSURE: 102 MMHG | BODY MASS INDEX: 27.49 KG/M2 | OXYGEN SATURATION: 98 % | HEIGHT: 70 IN | TEMPERATURE: 98.3 F

## 2022-07-09 DIAGNOSIS — M70.21 OLECRANON BURSITIS OF RIGHT ELBOW: Primary | ICD-10-CM

## 2022-07-09 LAB
APPEARANCE FLD: ABNORMAL
BASOPHILS # BLD AUTO: 0.03 10*3/MM3 (ref 0–0.2)
BASOPHILS NFR BLD AUTO: 0.4 % (ref 0–1.5)
COLOR FLD: YELLOW
CRP SERPL-MCNC: 1.98 MG/DL (ref 0–0.5)
CRYSTALS FLD MICRO: NORMAL
DEPRECATED RDW RBC AUTO: 49.6 FL (ref 37–54)
EOSINOPHIL # BLD AUTO: 0.13 10*3/MM3 (ref 0–0.4)
EOSINOPHIL NFR BLD AUTO: 1.7 % (ref 0.3–6.2)
ERYTHROCYTE [DISTWIDTH] IN BLOOD BY AUTOMATED COUNT: 14.6 % (ref 12.3–15.4)
HCT VFR BLD AUTO: 31.4 % (ref 37.5–51)
HGB BLD-MCNC: 10 G/DL (ref 13–17.7)
IMM GRANULOCYTES # BLD AUTO: 0.03 10*3/MM3 (ref 0–0.05)
IMM GRANULOCYTES NFR BLD AUTO: 0.4 % (ref 0–0.5)
INR PPP: 2.5 (ref 0.9–1.1)
LYMPHOCYTES # BLD AUTO: 0.57 10*3/MM3 (ref 0.7–3.1)
LYMPHOCYTES NFR BLD AUTO: 7.4 % (ref 19.6–45.3)
LYMPHOCYTES NFR FLD MANUAL: 2 %
MCH RBC QN AUTO: 29.2 PG (ref 26.6–33)
MCHC RBC AUTO-ENTMCNC: 31.8 G/DL (ref 31.5–35.7)
MCV RBC AUTO: 91.5 FL (ref 79–97)
MONOCYTES # BLD AUTO: 0.83 10*3/MM3 (ref 0.1–0.9)
MONOCYTES NFR BLD AUTO: 10.8 % (ref 5–12)
MONOS+MACROS NFR FLD: 14 %
NEUTROPHILS NFR BLD AUTO: 6.07 10*3/MM3 (ref 1.7–7)
NEUTROPHILS NFR BLD AUTO: 79.3 % (ref 42.7–76)
NEUTROPHILS NFR FLD MANUAL: 84 %
NRBC BLD AUTO-RTO: 0 /100 WBC (ref 0–0.2)
NUC CELL # FLD: 414 /MM3
PLATELET # BLD AUTO: 120 10*3/MM3 (ref 140–450)
PMV BLD AUTO: 11.1 FL (ref 6–12)
PROTHROMBIN TIME: 27.8 SECONDS (ref 12.1–14.7)
RBC # BLD AUTO: 3.43 10*6/MM3 (ref 4.14–5.8)
RBC # FLD AUTO: ABNORMAL 10*3/UL
URATE SERPL-MCNC: 7.5 MG/DL (ref 3.4–7)
WBC NRBC COR # BLD: 7.66 10*3/MM3 (ref 3.4–10.8)

## 2022-07-09 PROCEDURE — 89060 EXAM SYNOVIAL FLUID CRYSTALS: CPT | Performed by: PHYSICIAN ASSISTANT

## 2022-07-09 PROCEDURE — 85025 COMPLETE CBC W/AUTO DIFF WBC: CPT | Performed by: PHYSICIAN ASSISTANT

## 2022-07-09 PROCEDURE — 84550 ASSAY OF BLOOD/URIC ACID: CPT | Performed by: PHYSICIAN ASSISTANT

## 2022-07-09 PROCEDURE — 99283 EMERGENCY DEPT VISIT LOW MDM: CPT

## 2022-07-09 PROCEDURE — 25010000002 DEXAMETHASONE PER 1 MG: Performed by: PHYSICIAN ASSISTANT

## 2022-07-09 PROCEDURE — 85610 PROTHROMBIN TIME: CPT | Performed by: PHYSICIAN ASSISTANT

## 2022-07-09 PROCEDURE — 96372 THER/PROPH/DIAG INJ SC/IM: CPT

## 2022-07-09 PROCEDURE — 89051 BODY FLUID CELL COUNT: CPT | Performed by: PHYSICIAN ASSISTANT

## 2022-07-09 PROCEDURE — 86140 C-REACTIVE PROTEIN: CPT | Performed by: PHYSICIAN ASSISTANT

## 2022-07-09 PROCEDURE — 36415 COLL VENOUS BLD VENIPUNCTURE: CPT

## 2022-07-09 RX ORDER — METHYLPREDNISOLONE 4 MG/1
TABLET ORAL
Qty: 21 TABLET | Refills: 0 | Status: SHIPPED | OUTPATIENT
Start: 2022-07-09 | End: 2023-01-06

## 2022-07-09 RX ORDER — DEXAMETHASONE SODIUM PHOSPHATE 4 MG/ML
8 INJECTION, SOLUTION INTRA-ARTICULAR; INTRALESIONAL; INTRAMUSCULAR; INTRAVENOUS; SOFT TISSUE ONCE
Status: COMPLETED | OUTPATIENT
Start: 2022-07-09 | End: 2022-07-09

## 2022-07-09 RX ORDER — LIDOCAINE HYDROCHLORIDE 10 MG/ML
10 INJECTION, SOLUTION EPIDURAL; INFILTRATION; INTRACAUDAL; PERINEURAL ONCE
Status: COMPLETED | OUTPATIENT
Start: 2022-07-09 | End: 2022-07-09

## 2022-07-09 RX ORDER — CLINDAMYCIN HYDROCHLORIDE 300 MG/1
300 CAPSULE ORAL 3 TIMES DAILY
Qty: 30 CAPSULE | Refills: 0 | Status: SHIPPED | OUTPATIENT
Start: 2022-07-09 | End: 2023-01-06

## 2022-07-09 RX ADMIN — DEXAMETHASONE SODIUM PHOSPHATE 8 MG: 4 INJECTION, SOLUTION INTRA-ARTICULAR; INTRALESIONAL; INTRAMUSCULAR; INTRAVENOUS; SOFT TISSUE at 10:55

## 2022-07-09 RX ADMIN — LIDOCAINE HYDROCHLORIDE 10 ML: 10 INJECTION, SOLUTION EPIDURAL; INFILTRATION; INTRACAUDAL; PERINEURAL at 10:55
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
## 2022-08-28 ENCOUNTER — HOSPITAL ENCOUNTER (EMERGENCY)
Facility: HOSPITAL | Age: 82
Discharge: HOME | End: 2022-08-28
Attending: EMERGENCY MEDICINE | Admitting: EMERGENCY MEDICINE

## 2022-08-28 VITALS
TEMPERATURE: 98 F | HEIGHT: 70 IN | SYSTOLIC BLOOD PRESSURE: 107 MMHG | BODY MASS INDEX: 27.49 KG/M2 | OXYGEN SATURATION: 99 % | HEART RATE: 78 BPM | DIASTOLIC BLOOD PRESSURE: 63 MMHG | RESPIRATION RATE: 18 BRPM | WEIGHT: 192 LBS

## 2022-08-28 DIAGNOSIS — I83.892 BLEEDING FROM VARICOSE VEINS OF LOWER EXTREMITY, LEFT: Primary | ICD-10-CM

## 2022-08-28 PROCEDURE — 99283 EMERGENCY DEPT VISIT LOW MDM: CPT

## 2022-08-28 RX ORDER — LIDOCAINE HYDROCHLORIDE AND EPINEPHRINE BITARTRATE 20; .01 MG/ML; MG/ML
INJECTION, SOLUTION SUBCUTANEOUS
Status: COMPLETED
Start: 2022-08-28 | End: 2022-08-28

## 2022-08-28 RX ADMIN — LIDOCAINE HYDROCHLORIDE AND EPINEPHRINE 20 ML: 20; 10 INJECTION, SOLUTION INFILTRATION; PERINEURAL at 22:27

## 2022-09-20 ENCOUNTER — OFFICE VISIT (OUTPATIENT)
Dept: GASTROENTEROLOGY | Facility: CLINIC | Age: 82
End: 2022-09-20

## 2022-09-20 ENCOUNTER — LAB (OUTPATIENT)
Dept: LAB | Facility: HOSPITAL | Age: 82
End: 2022-09-20

## 2022-09-20 VITALS — HEIGHT: 70 IN | WEIGHT: 196 LBS | BODY MASS INDEX: 28.06 KG/M2

## 2022-09-20 DIAGNOSIS — K74.60 HEPATIC CIRRHOSIS, UNSPECIFIED HEPATIC CIRRHOSIS TYPE, UNSPECIFIED WHETHER ASCITES PRESENT: Primary | ICD-10-CM

## 2022-09-20 LAB
INR PPP: 2.8 (ref 0.9–1.1)
PROTHROMBIN TIME: 30.4 SECONDS (ref 12.1–14.7)

## 2022-09-20 PROCEDURE — 84466 ASSAY OF TRANSFERRIN: CPT | Performed by: PHYSICIAN ASSISTANT

## 2022-09-20 PROCEDURE — 82105 ALPHA-FETOPROTEIN SERUM: CPT | Performed by: PHYSICIAN ASSISTANT

## 2022-09-20 PROCEDURE — 85025 COMPLETE CBC W/AUTO DIFF WBC: CPT | Performed by: PHYSICIAN ASSISTANT

## 2022-09-20 PROCEDURE — 83540 ASSAY OF IRON: CPT | Performed by: PHYSICIAN ASSISTANT

## 2022-09-20 PROCEDURE — 82728 ASSAY OF FERRITIN: CPT | Performed by: PHYSICIAN ASSISTANT

## 2022-09-20 PROCEDURE — 99214 OFFICE O/P EST MOD 30 MIN: CPT | Performed by: PHYSICIAN ASSISTANT

## 2022-09-20 PROCEDURE — 82977 ASSAY OF GGT: CPT | Performed by: PHYSICIAN ASSISTANT

## 2022-09-20 PROCEDURE — 85610 PROTHROMBIN TIME: CPT | Performed by: PHYSICIAN ASSISTANT

## 2022-09-20 PROCEDURE — 80053 COMPREHEN METABOLIC PANEL: CPT | Performed by: PHYSICIAN ASSISTANT

## 2022-09-21 ENCOUNTER — TELEPHONE (OUTPATIENT)
Dept: GASTROENTEROLOGY | Facility: CLINIC | Age: 82
End: 2022-09-21

## 2022-09-21 DIAGNOSIS — D50.0 IRON DEFICIENCY ANEMIA DUE TO CHRONIC BLOOD LOSS: Primary | ICD-10-CM

## 2022-09-21 LAB
ALBUMIN SERPL-MCNC: 4.1 G/DL (ref 3.5–5.2)
ALBUMIN/GLOB SERPL: 2 G/DL
ALP SERPL-CCNC: 66 U/L (ref 39–117)
ALPHA-FETOPROTEIN: <2 NG/ML (ref 0–8.3)
ALT SERPL W P-5'-P-CCNC: 8 U/L (ref 1–41)
ANION GAP SERPL CALCULATED.3IONS-SCNC: 9 MMOL/L (ref 5–15)
AST SERPL-CCNC: 13 U/L (ref 1–40)
BASOPHILS # BLD AUTO: 0.04 10*3/MM3 (ref 0–0.2)
BASOPHILS NFR BLD AUTO: 0.7 % (ref 0–1.5)
BILIRUB SERPL-MCNC: 0.5 MG/DL (ref 0–1.2)
BUN SERPL-MCNC: 29 MG/DL (ref 8–23)
BUN/CREAT SERPL: 18.7 (ref 7–25)
CALCIUM SPEC-SCNC: 8.5 MG/DL (ref 8.6–10.5)
CHLORIDE SERPL-SCNC: 102 MMOL/L (ref 98–107)
CO2 SERPL-SCNC: 25 MMOL/L (ref 22–29)
CREAT SERPL-MCNC: 1.55 MG/DL (ref 0.76–1.27)
DEPRECATED RDW RBC AUTO: 43.2 FL (ref 37–54)
EGFRCR SERPLBLD CKD-EPI 2021: 44.7 ML/MIN/1.73
EOSINOPHIL # BLD AUTO: 0.12 10*3/MM3 (ref 0–0.4)
EOSINOPHIL NFR BLD AUTO: 2.1 % (ref 0.3–6.2)
ERYTHROCYTE [DISTWIDTH] IN BLOOD BY AUTOMATED COUNT: 15.6 % (ref 12.3–15.4)
FERRITIN SERPL-MCNC: 10.1 NG/ML (ref 30–400)
GGT SERPL-CCNC: 15 U/L (ref 8–61)
GLOBULIN UR ELPH-MCNC: 2.1 GM/DL
GLUCOSE SERPL-MCNC: 101 MG/DL (ref 65–99)
HCT VFR BLD AUTO: 22 % (ref 37.5–51)
HGB BLD-MCNC: 6.7 G/DL (ref 13–17.7)
IMM GRANULOCYTES # BLD AUTO: 0.02 10*3/MM3 (ref 0–0.05)
IMM GRANULOCYTES NFR BLD AUTO: 0.4 % (ref 0–0.5)
IRON 24H UR-MRATE: 16 MCG/DL (ref 59–158)
IRON SATN MFR SERPL: 3 % (ref 20–50)
LYMPHOCYTES # BLD AUTO: 0.56 10*3/MM3 (ref 0.7–3.1)
LYMPHOCYTES NFR BLD AUTO: 9.9 % (ref 19.6–45.3)
MCH RBC QN AUTO: 23.3 PG (ref 26.6–33)
MCHC RBC AUTO-ENTMCNC: 30.5 G/DL (ref 31.5–35.7)
MCV RBC AUTO: 76.4 FL (ref 79–97)
MONOCYTES # BLD AUTO: 0.65 10*3/MM3 (ref 0.1–0.9)
MONOCYTES NFR BLD AUTO: 11.5 % (ref 5–12)
NEUTROPHILS NFR BLD AUTO: 4.27 10*3/MM3 (ref 1.7–7)
NEUTROPHILS NFR BLD AUTO: 75.4 % (ref 42.7–76)
NRBC BLD AUTO-RTO: 0.2 /100 WBC (ref 0–0.2)
PLATELET # BLD AUTO: 182 10*3/MM3 (ref 140–450)
PMV BLD AUTO: 12.3 FL (ref 6–12)
POTASSIUM SERPL-SCNC: 4.1 MMOL/L (ref 3.5–5.2)
PROT SERPL-MCNC: 6.2 G/DL (ref 6–8.5)
RBC # BLD AUTO: 2.88 10*6/MM3 (ref 4.14–5.8)
SODIUM SERPL-SCNC: 136 MMOL/L (ref 136–145)
TIBC SERPL-MCNC: 527 MCG/DL (ref 298–536)
TRANSFERRIN SERPL-MCNC: 354 MG/DL (ref 200–360)
WBC NRBC COR # BLD: 5.66 10*3/MM3 (ref 3.4–10.8)

## 2022-09-21 RX ORDER — DIPHENHYDRAMINE HCL 25 MG
25 CAPSULE ORAL ONCE
Status: CANCELLED | OUTPATIENT
Start: 2022-09-21 | End: 2022-09-21

## 2022-09-21 RX ORDER — SODIUM CHLORIDE 9 MG/ML
250 INJECTION, SOLUTION INTRAVENOUS AS NEEDED
Status: CANCELLED | OUTPATIENT
Start: 2022-09-21

## 2022-09-21 NOTE — TELEPHONE ENCOUNTER
Call the patient regarding his low hemoglobin level of 6.7.  He is being set up for a blood transfusion on an outpatient side.  He was told if symptoms worsen to seek care at the ER if needed.    Patient's low blood level most likely linked to prior incident of old wound bleeding, he was unable to get it stopped and had to present to the ER for epinephrine injections.

## 2022-09-22 ENCOUNTER — TRANSCRIBE ORDERS (OUTPATIENT)
Dept: ONCOLOGY | Facility: HOSPITAL | Age: 82
End: 2022-09-22

## 2022-09-22 ENCOUNTER — INFUSION (OUTPATIENT)
Dept: ONCOLOGY | Facility: HOSPITAL | Age: 82
End: 2022-09-22

## 2022-09-22 VITALS
OXYGEN SATURATION: 100 % | DIASTOLIC BLOOD PRESSURE: 60 MMHG | RESPIRATION RATE: 18 BRPM | SYSTOLIC BLOOD PRESSURE: 127 MMHG | TEMPERATURE: 98 F | HEART RATE: 75 BPM

## 2022-09-22 DIAGNOSIS — D50.0 IRON DEFICIENCY ANEMIA DUE TO CHRONIC BLOOD LOSS: ICD-10-CM

## 2022-09-22 LAB
ABO GROUP BLD: NORMAL
ABO GROUP BLD: NORMAL
BLD GP AB SCN SERPL QL: NEGATIVE
HGB BLD-MCNC: 7.3 G/DL (ref 13–17.7)
HGB BLD-MCNC: 7.7 G/DL (ref 13–17.7)
RH BLD: POSITIVE
RH BLD: POSITIVE
T&S EXPIRATION DATE: NORMAL

## 2022-09-22 PROCEDURE — 86900 BLOOD TYPING SEROLOGIC ABO: CPT

## 2022-09-22 PROCEDURE — 36430 TRANSFUSION BLD/BLD COMPNT: CPT

## 2022-09-22 PROCEDURE — 86901 BLOOD TYPING SEROLOGIC RH(D): CPT

## 2022-09-22 PROCEDURE — 85018 HEMOGLOBIN: CPT

## 2022-09-22 PROCEDURE — 86923 COMPATIBILITY TEST ELECTRIC: CPT

## 2022-09-22 PROCEDURE — 63710000001 DIPHENHYDRAMINE PER 50 MG: Performed by: PHYSICIAN ASSISTANT

## 2022-09-22 PROCEDURE — 86850 RBC ANTIBODY SCREEN: CPT

## 2022-09-22 PROCEDURE — P9016 RBC LEUKOCYTES REDUCED: HCPCS

## 2022-09-22 RX ORDER — SODIUM CHLORIDE 9 MG/ML
250 INJECTION, SOLUTION INTRAVENOUS AS NEEDED
Status: DISCONTINUED | OUTPATIENT
Start: 2022-09-22 | End: 2022-09-22 | Stop reason: HOSPADM

## 2022-09-22 RX ORDER — DIPHENHYDRAMINE HCL 25 MG
25 CAPSULE ORAL ONCE
Status: COMPLETED | OUTPATIENT
Start: 2022-09-22 | End: 2022-09-22

## 2022-09-22 RX ADMIN — DIPHENHYDRAMINE HYDROCHLORIDE 25 MG: 25 CAPSULE ORAL at 09:40

## 2022-09-24 LAB
BH BB BLOOD EXPIRATION DATE: NORMAL
BH BB BLOOD EXPIRATION DATE: NORMAL
BH BB BLOOD TYPE BARCODE: 6200
BH BB BLOOD TYPE BARCODE: 6200
BH BB DISPENSE STATUS: NORMAL
BH BB DISPENSE STATUS: NORMAL
BH BB PRODUCT CODE: NORMAL
BH BB PRODUCT CODE: NORMAL
BH BB UNIT NUMBER: NORMAL
BH BB UNIT NUMBER: NORMAL
CROSSMATCH INTERPRETATION: NORMAL
CROSSMATCH INTERPRETATION: NORMAL
UNIT  ABO: NORMAL
UNIT  ABO: NORMAL
UNIT  RH: NORMAL
UNIT  RH: NORMAL

## 2022-09-28 PROBLEM — K74.60 HEPATIC CIRRHOSIS (HCC): Status: ACTIVE | Noted: 2022-09-28

## 2022-10-11 ENCOUNTER — TELEPHONE (OUTPATIENT)
Dept: CARDIOLOGY | Facility: CLINIC | Age: 82
End: 2022-10-11

## 2022-10-11 NOTE — TELEPHONE ENCOUNTER
Spoke with Janaymarcia to alert him that his latitude monitor is not connecting. He stated [place] is [place] due to his bother in law dying and will not be home for a few mor weeks.

## 2022-11-18 ENCOUNTER — TRANSCRIBE ORDERS (OUTPATIENT)
Dept: WOUND CARE | Facility: HOSPITAL | Age: 82
End: 2022-11-18

## 2022-11-18 DIAGNOSIS — L97.309: Primary | ICD-10-CM

## 2022-11-22 ENCOUNTER — HOSPITAL ENCOUNTER (OUTPATIENT)
Dept: WOUND CARE | Facility: HOSPITAL | Age: 82
Discharge: HOME | End: 2022-11-22
Admitting: NURSE PRACTITIONER

## 2022-11-22 VITALS
SYSTOLIC BLOOD PRESSURE: 122 MMHG | HEART RATE: 75 BPM | TEMPERATURE: 98.1 F | DIASTOLIC BLOOD PRESSURE: 60 MMHG | RESPIRATION RATE: 17 BRPM

## 2022-11-22 RX ORDER — LIDOCAINE HYDROCHLORIDE 20 MG/ML
JELLY TOPICAL ONCE
Status: COMPLETED | OUTPATIENT
Start: 2022-11-22 | End: 2022-11-22

## 2022-11-22 RX ADMIN — LIDOCAINE HYDROCHLORIDE: 20 JELLY TOPICAL at 11:10

## 2022-12-01 ENCOUNTER — HOSPITAL ENCOUNTER (OUTPATIENT)
Dept: WOUND CARE | Facility: HOSPITAL | Age: 82
Discharge: HOME | End: 2022-12-01
Admitting: NURSE PRACTITIONER

## 2022-12-01 VITALS
TEMPERATURE: 98.3 F | RESPIRATION RATE: 18 BRPM | DIASTOLIC BLOOD PRESSURE: 58 MMHG | WEIGHT: 195 LBS | SYSTOLIC BLOOD PRESSURE: 104 MMHG | HEART RATE: 64 BPM | BODY MASS INDEX: 27.98 KG/M2

## 2022-12-01 PROCEDURE — 97602 WOUND(S) CARE NON-SELECTIVE: CPT

## 2022-12-01 RX ORDER — LIDOCAINE HYDROCHLORIDE 20 MG/ML
JELLY TOPICAL ONCE
Status: COMPLETED | OUTPATIENT
Start: 2022-12-01 | End: 2022-12-01

## 2022-12-01 RX ADMIN — LIDOCAINE HYDROCHLORIDE: 20 JELLY TOPICAL at 10:01
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
## 2022-12-08 PROBLEM — L97.909 VENOUS ULCER (HCC): Status: ACTIVE | Noted: 2022-12-08

## 2022-12-08 PROBLEM — I83.009 VENOUS ULCER: Status: ACTIVE | Noted: 2022-12-08

## 2022-12-08 NOTE — ADDENDUM NOTE
Encounter addended by: Patti Apodaca APRN on: 12/8/2022 2:23 PM   Actions taken: Problem List modified, Clinical Note Signed

## 2022-12-09 ENCOUNTER — TRANSCRIBE ORDERS (OUTPATIENT)
Dept: ONCOLOGY | Facility: HOSPITAL | Age: 82
End: 2022-12-09

## 2022-12-10 ENCOUNTER — INFUSION (OUTPATIENT)
Dept: ONCOLOGY | Facility: HOSPITAL | Age: 82
End: 2022-12-10

## 2022-12-10 DIAGNOSIS — L03.90 CELLULITIS, UNSPECIFIED CELLULITIS SITE: Primary | ICD-10-CM

## 2022-12-10 LAB
ACANTHOCYTES BLD QL SMEAR: NORMAL
ANION GAP SERPL CALCULATED.3IONS-SCNC: 8.7 MMOL/L (ref 5–15)
ANISOCYTOSIS BLD QL: NORMAL
BASOPHILS # BLD AUTO: 0.05 10*3/MM3 (ref 0–0.2)
BASOPHILS NFR BLD AUTO: 0.8 % (ref 0–1.5)
BUN SERPL-MCNC: 24 MG/DL (ref 8–23)
BUN/CREAT SERPL: 16.6 (ref 7–25)
CALCIUM SPEC-SCNC: 8.2 MG/DL (ref 8.6–10.5)
CHLORIDE SERPL-SCNC: 106 MMOL/L (ref 98–107)
CK SERPL-CCNC: 151 U/L (ref 20–200)
CO2 SERPL-SCNC: 26.3 MMOL/L (ref 22–29)
CREAT SERPL-MCNC: 1.45 MG/DL (ref 0.76–1.27)
CRP SERPL-MCNC: 0.49 MG/DL (ref 0–0.5)
DEPRECATED RDW RBC AUTO: 50.1 FL (ref 37–54)
EGFRCR SERPLBLD CKD-EPI 2021: 48.4 ML/MIN/1.73
EOSINOPHIL # BLD AUTO: 0.28 10*3/MM3 (ref 0–0.4)
EOSINOPHIL NFR BLD AUTO: 4.5 % (ref 0.3–6.2)
ERYTHROCYTE [DISTWIDTH] IN BLOOD BY AUTOMATED COUNT: 20.7 % (ref 12.3–15.4)
GLUCOSE SERPL-MCNC: 120 MG/DL (ref 65–99)
HCT VFR BLD AUTO: 29.6 % (ref 37.5–51)
HGB BLD-MCNC: 8.4 G/DL (ref 13–17.7)
HYPOCHROMIA BLD QL: NORMAL
IMM GRANULOCYTES # BLD AUTO: 0.01 10*3/MM3 (ref 0–0.05)
IMM GRANULOCYTES NFR BLD AUTO: 0.2 % (ref 0–0.5)
LYMPHOCYTES # BLD AUTO: 0.72 10*3/MM3 (ref 0.7–3.1)
LYMPHOCYTES NFR BLD AUTO: 11.5 % (ref 19.6–45.3)
MCH RBC QN AUTO: 19.4 PG (ref 26.6–33)
MCHC RBC AUTO-ENTMCNC: 28.4 G/DL (ref 31.5–35.7)
MCV RBC AUTO: 68.5 FL (ref 79–97)
MICROCYTES BLD QL: NORMAL
MONOCYTES # BLD AUTO: 0.61 10*3/MM3 (ref 0.1–0.9)
MONOCYTES NFR BLD AUTO: 9.7 % (ref 5–12)
NEUTROPHILS NFR BLD AUTO: 4.59 10*3/MM3 (ref 1.7–7)
NEUTROPHILS NFR BLD AUTO: 73.3 % (ref 42.7–76)
NRBC BLD AUTO-RTO: 0 /100 WBC (ref 0–0.2)
OVALOCYTES BLD QL SMEAR: NORMAL
PLAT MORPH BLD: NORMAL
PLATELET # BLD AUTO: 190 10*3/MM3 (ref 140–450)
PMV BLD AUTO: 10.1 FL (ref 6–12)
POTASSIUM SERPL-SCNC: 4.6 MMOL/L (ref 3.5–5.2)
RBC # BLD AUTO: 4.32 10*6/MM3 (ref 4.14–5.8)
SCHISTOCYTES BLD QL SMEAR: NORMAL
SODIUM SERPL-SCNC: 141 MMOL/L (ref 136–145)
WBC NRBC COR # BLD: 6.26 10*3/MM3 (ref 3.4–10.8)

## 2022-12-10 PROCEDURE — 86140 C-REACTIVE PROTEIN: CPT | Performed by: INTERNAL MEDICINE

## 2022-12-10 PROCEDURE — 85007 BL SMEAR W/DIFF WBC COUNT: CPT | Performed by: INTERNAL MEDICINE

## 2022-12-10 PROCEDURE — 25010000002 DAPTOMYCIN PER 1 MG: Performed by: INTERNAL MEDICINE

## 2022-12-10 PROCEDURE — 82550 ASSAY OF CK (CPK): CPT | Performed by: INTERNAL MEDICINE

## 2022-12-10 PROCEDURE — 25010000002 CEFTRIAXONE: Performed by: INTERNAL MEDICINE

## 2022-12-10 PROCEDURE — 80048 BASIC METABOLIC PNL TOTAL CA: CPT | Performed by: INTERNAL MEDICINE

## 2022-12-10 PROCEDURE — 85025 COMPLETE CBC W/AUTO DIFF WBC: CPT | Performed by: INTERNAL MEDICINE

## 2022-12-10 PROCEDURE — 96365 THER/PROPH/DIAG IV INF INIT: CPT

## 2022-12-10 PROCEDURE — 96368 THER/DIAG CONCURRENT INF: CPT

## 2022-12-10 RX ADMIN — DAPTOMYCIN 350 MG: 500 INJECTION, POWDER, LYOPHILIZED, FOR SOLUTION INTRAVENOUS at 10:10

## 2022-12-10 RX ADMIN — CEFTRIAXONE 1 G: 1 INJECTION, POWDER, FOR SOLUTION INTRAMUSCULAR; INTRAVENOUS at 10:11

## 2022-12-11 ENCOUNTER — INFUSION (OUTPATIENT)
Dept: ONCOLOGY | Facility: HOSPITAL | Age: 82
End: 2022-12-11

## 2022-12-11 VITALS
SYSTOLIC BLOOD PRESSURE: 94 MMHG | HEART RATE: 76 BPM | RESPIRATION RATE: 18 BRPM | DIASTOLIC BLOOD PRESSURE: 46 MMHG | TEMPERATURE: 98 F

## 2022-12-11 DIAGNOSIS — L03.90 CELLULITIS, UNSPECIFIED CELLULITIS SITE: Primary | ICD-10-CM

## 2022-12-11 PROCEDURE — 96368 THER/DIAG CONCURRENT INF: CPT

## 2022-12-11 PROCEDURE — 25010000002 CEFTRIAXONE: Performed by: INTERNAL MEDICINE

## 2022-12-11 PROCEDURE — 25010000002 DAPTOMYCIN PER 1 MG: Performed by: INTERNAL MEDICINE

## 2022-12-11 PROCEDURE — 96365 THER/PROPH/DIAG IV INF INIT: CPT

## 2022-12-11 RX ADMIN — DAPTOMYCIN 350 MG: 500 INJECTION, POWDER, LYOPHILIZED, FOR SOLUTION INTRAVENOUS at 09:28

## 2022-12-11 RX ADMIN — CEFTRIAXONE 1 G: 1 INJECTION, POWDER, FOR SOLUTION INTRAMUSCULAR; INTRAVENOUS at 09:29

## 2022-12-12 ENCOUNTER — INFUSION (OUTPATIENT)
Dept: ONCOLOGY | Facility: HOSPITAL | Age: 82
End: 2022-12-12

## 2022-12-12 VITALS
HEART RATE: 81 BPM | RESPIRATION RATE: 18 BRPM | DIASTOLIC BLOOD PRESSURE: 57 MMHG | OXYGEN SATURATION: 95 % | SYSTOLIC BLOOD PRESSURE: 129 MMHG | TEMPERATURE: 97.1 F

## 2022-12-12 DIAGNOSIS — L03.90 CELLULITIS, UNSPECIFIED CELLULITIS SITE: Primary | ICD-10-CM

## 2022-12-12 LAB
ACANTHOCYTES BLD QL SMEAR: NORMAL
ANION GAP SERPL CALCULATED.3IONS-SCNC: 10.7 MMOL/L (ref 5–15)
ANISOCYTOSIS BLD QL: NORMAL
BASOPHILS # BLD AUTO: 0.07 10*3/MM3 (ref 0–0.2)
BASOPHILS NFR BLD AUTO: 1.1 % (ref 0–1.5)
BUN SERPL-MCNC: 25 MG/DL (ref 8–23)
BUN/CREAT SERPL: 16.4 (ref 7–25)
CALCIUM SPEC-SCNC: 8.2 MG/DL (ref 8.6–10.5)
CHLORIDE SERPL-SCNC: 102 MMOL/L (ref 98–107)
CK SERPL-CCNC: 85 U/L (ref 20–200)
CO2 SERPL-SCNC: 26.3 MMOL/L (ref 22–29)
CREAT SERPL-MCNC: 1.52 MG/DL (ref 0.76–1.27)
CRP SERPL-MCNC: 0.85 MG/DL (ref 0–0.5)
DACRYOCYTES BLD QL SMEAR: NORMAL
DEPRECATED RDW RBC AUTO: 50.4 FL (ref 37–54)
EGFRCR SERPLBLD CKD-EPI 2021: 45.7 ML/MIN/1.73
ELLIPTOCYTES BLD QL SMEAR: NORMAL
EOSINOPHIL # BLD AUTO: 0.38 10*3/MM3 (ref 0–0.4)
EOSINOPHIL NFR BLD AUTO: 5.8 % (ref 0.3–6.2)
ERYTHROCYTE [DISTWIDTH] IN BLOOD BY AUTOMATED COUNT: 21 % (ref 12.3–15.4)
GLUCOSE SERPL-MCNC: 93 MG/DL (ref 65–99)
HCT VFR BLD AUTO: 28.6 % (ref 37.5–51)
HGB BLD-MCNC: 8.1 G/DL (ref 13–17.7)
HYPOCHROMIA BLD QL: NORMAL
IMM GRANULOCYTES # BLD AUTO: 0.02 10*3/MM3 (ref 0–0.05)
IMM GRANULOCYTES NFR BLD AUTO: 0.3 % (ref 0–0.5)
LYMPHOCYTES # BLD AUTO: 0.82 10*3/MM3 (ref 0.7–3.1)
LYMPHOCYTES NFR BLD AUTO: 12.6 % (ref 19.6–45.3)
MCH RBC QN AUTO: 19.4 PG (ref 26.6–33)
MCHC RBC AUTO-ENTMCNC: 28.3 G/DL (ref 31.5–35.7)
MCV RBC AUTO: 68.6 FL (ref 79–97)
MICROCYTES BLD QL: NORMAL
MONOCYTES # BLD AUTO: 0.76 10*3/MM3 (ref 0.1–0.9)
MONOCYTES NFR BLD AUTO: 11.7 % (ref 5–12)
NEUTROPHILS NFR BLD AUTO: 4.45 10*3/MM3 (ref 1.7–7)
NEUTROPHILS NFR BLD AUTO: 68.5 % (ref 42.7–76)
NRBC BLD AUTO-RTO: 0 /100 WBC (ref 0–0.2)
PLAT MORPH BLD: NORMAL
PLATELET # BLD AUTO: 197 10*3/MM3 (ref 140–450)
PMV BLD AUTO: 9.6 FL (ref 6–12)
POTASSIUM SERPL-SCNC: 4.2 MMOL/L (ref 3.5–5.2)
RBC # BLD AUTO: 4.17 10*6/MM3 (ref 4.14–5.8)
SCHISTOCYTES BLD QL SMEAR: NORMAL
SODIUM SERPL-SCNC: 139 MMOL/L (ref 136–145)
TARGETS BLD QL SMEAR: NORMAL
WBC NRBC COR # BLD: 6.5 10*3/MM3 (ref 3.4–10.8)

## 2022-12-12 PROCEDURE — 96367 TX/PROPH/DG ADDL SEQ IV INF: CPT

## 2022-12-12 PROCEDURE — 86140 C-REACTIVE PROTEIN: CPT | Performed by: INTERNAL MEDICINE

## 2022-12-12 PROCEDURE — 85025 COMPLETE CBC W/AUTO DIFF WBC: CPT | Performed by: INTERNAL MEDICINE

## 2022-12-12 PROCEDURE — 36415 COLL VENOUS BLD VENIPUNCTURE: CPT

## 2022-12-12 PROCEDURE — 80048 BASIC METABOLIC PNL TOTAL CA: CPT | Performed by: INTERNAL MEDICINE

## 2022-12-12 PROCEDURE — 96368 THER/DIAG CONCURRENT INF: CPT

## 2022-12-12 PROCEDURE — 25010000002 DAPTOMYCIN PER 1 MG: Performed by: INTERNAL MEDICINE

## 2022-12-12 PROCEDURE — 25010000002 CEFTRIAXONE PER 250 MG: Performed by: INTERNAL MEDICINE

## 2022-12-12 PROCEDURE — 96365 THER/PROPH/DIAG IV INF INIT: CPT

## 2022-12-12 PROCEDURE — 85007 BL SMEAR W/DIFF WBC COUNT: CPT | Performed by: INTERNAL MEDICINE

## 2022-12-12 PROCEDURE — 82550 ASSAY OF CK (CPK): CPT | Performed by: INTERNAL MEDICINE

## 2022-12-12 RX ADMIN — CEFTRIAXONE 1 G: 1 INJECTION, POWDER, FOR SOLUTION INTRAMUSCULAR; INTRAVENOUS at 15:17

## 2022-12-12 RX ADMIN — DAPTOMYCIN 350 MG: 500 INJECTION, POWDER, LYOPHILIZED, FOR SOLUTION INTRAVENOUS at 15:06

## 2022-12-13 ENCOUNTER — INFUSION (OUTPATIENT)
Dept: ONCOLOGY | Facility: HOSPITAL | Age: 82
End: 2022-12-13

## 2022-12-13 VITALS
SYSTOLIC BLOOD PRESSURE: 121 MMHG | OXYGEN SATURATION: 98 % | HEART RATE: 88 BPM | DIASTOLIC BLOOD PRESSURE: 52 MMHG | RESPIRATION RATE: 18 BRPM | TEMPERATURE: 97.8 F

## 2022-12-13 DIAGNOSIS — L03.90 CELLULITIS, UNSPECIFIED CELLULITIS SITE: Primary | ICD-10-CM

## 2022-12-13 PROCEDURE — 96365 THER/PROPH/DIAG IV INF INIT: CPT

## 2022-12-13 PROCEDURE — 25010000002 DAPTOMYCIN PER 1 MG: Performed by: INTERNAL MEDICINE

## 2022-12-13 PROCEDURE — 25010000002 CEFTRIAXONE PER 250 MG: Performed by: INTERNAL MEDICINE

## 2022-12-13 PROCEDURE — 96367 TX/PROPH/DG ADDL SEQ IV INF: CPT

## 2022-12-13 RX ADMIN — CEFTRIAXONE SODIUM 1 G: 1 INJECTION, POWDER, FOR SOLUTION INTRAMUSCULAR; INTRAVENOUS at 14:38

## 2022-12-13 RX ADMIN — DAPTOMYCIN 350 MG: 500 INJECTION, POWDER, LYOPHILIZED, FOR SOLUTION INTRAVENOUS at 15:07

## 2022-12-14 ENCOUNTER — INFUSION (OUTPATIENT)
Dept: ONCOLOGY | Facility: HOSPITAL | Age: 82
End: 2022-12-14

## 2022-12-14 VITALS
SYSTOLIC BLOOD PRESSURE: 114 MMHG | DIASTOLIC BLOOD PRESSURE: 57 MMHG | HEART RATE: 77 BPM | OXYGEN SATURATION: 98 % | TEMPERATURE: 98.2 F | RESPIRATION RATE: 18 BRPM

## 2022-12-14 DIAGNOSIS — L03.90 CELLULITIS, UNSPECIFIED CELLULITIS SITE: Primary | ICD-10-CM

## 2022-12-14 LAB
ACANTHOCYTES BLD QL SMEAR: NORMAL
ANION GAP SERPL CALCULATED.3IONS-SCNC: 8.5 MMOL/L (ref 5–15)
ANISOCYTOSIS BLD QL: NORMAL
BASOPHILS # BLD AUTO: 0.05 10*3/MM3 (ref 0–0.2)
BASOPHILS NFR BLD AUTO: 0.9 % (ref 0–1.5)
BUN SERPL-MCNC: 30 MG/DL (ref 8–23)
BUN/CREAT SERPL: 20 (ref 7–25)
CALCIUM SPEC-SCNC: 8.4 MG/DL (ref 8.6–10.5)
CHLORIDE SERPL-SCNC: 104 MMOL/L (ref 98–107)
CK SERPL-CCNC: 75 U/L (ref 20–200)
CO2 SERPL-SCNC: 26.5 MMOL/L (ref 22–29)
CREAT SERPL-MCNC: 1.5 MG/DL (ref 0.76–1.27)
CRP SERPL-MCNC: 0.79 MG/DL (ref 0–0.5)
DEPRECATED RDW RBC AUTO: 50.6 FL (ref 37–54)
EGFRCR SERPLBLD CKD-EPI 2021: 46.5 ML/MIN/1.73
ELLIPTOCYTES BLD QL SMEAR: NORMAL
EOSINOPHIL # BLD AUTO: 0.38 10*3/MM3 (ref 0–0.4)
EOSINOPHIL NFR BLD AUTO: 6.7 % (ref 0.3–6.2)
ERYTHROCYTE [DISTWIDTH] IN BLOOD BY AUTOMATED COUNT: 21.1 % (ref 12.3–15.4)
GLUCOSE SERPL-MCNC: 117 MG/DL (ref 65–99)
HCT VFR BLD AUTO: 27.1 % (ref 37.5–51)
HGB BLD-MCNC: 7.6 G/DL (ref 13–17.7)
HYPOCHROMIA BLD QL: NORMAL
IMM GRANULOCYTES # BLD AUTO: 0.02 10*3/MM3 (ref 0–0.05)
IMM GRANULOCYTES NFR BLD AUTO: 0.4 % (ref 0–0.5)
LYMPHOCYTES # BLD AUTO: 0.76 10*3/MM3 (ref 0.7–3.1)
LYMPHOCYTES NFR BLD AUTO: 13.5 % (ref 19.6–45.3)
MCH RBC QN AUTO: 19.1 PG (ref 26.6–33)
MCHC RBC AUTO-ENTMCNC: 28 G/DL (ref 31.5–35.7)
MCV RBC AUTO: 68.1 FL (ref 79–97)
MICROCYTES BLD QL: NORMAL
MONOCYTES # BLD AUTO: 0.67 10*3/MM3 (ref 0.1–0.9)
MONOCYTES NFR BLD AUTO: 11.9 % (ref 5–12)
NEUTROPHILS NFR BLD AUTO: 3.75 10*3/MM3 (ref 1.7–7)
NEUTROPHILS NFR BLD AUTO: 66.6 % (ref 42.7–76)
NRBC BLD AUTO-RTO: 0 /100 WBC (ref 0–0.2)
PLAT MORPH BLD: NORMAL
PLATELET # BLD AUTO: 198 10*3/MM3 (ref 140–450)
PMV BLD AUTO: 10.4 FL (ref 6–12)
POIKILOCYTOSIS BLD QL SMEAR: NORMAL
POTASSIUM SERPL-SCNC: 4.5 MMOL/L (ref 3.5–5.2)
RBC # BLD AUTO: 3.98 10*6/MM3 (ref 4.14–5.8)
SCHISTOCYTES BLD QL SMEAR: NORMAL
SODIUM SERPL-SCNC: 139 MMOL/L (ref 136–145)
WBC NRBC COR # BLD: 5.63 10*3/MM3 (ref 3.4–10.8)

## 2022-12-14 PROCEDURE — 25010000002 CEFTRIAXONE PER 250 MG: Performed by: INTERNAL MEDICINE

## 2022-12-14 PROCEDURE — 25010000002 DAPTOMYCIN PER 1 MG: Performed by: INTERNAL MEDICINE

## 2022-12-14 PROCEDURE — 96368 THER/DIAG CONCURRENT INF: CPT

## 2022-12-14 PROCEDURE — 96365 THER/PROPH/DIAG IV INF INIT: CPT

## 2022-12-14 PROCEDURE — 85007 BL SMEAR W/DIFF WBC COUNT: CPT | Performed by: INTERNAL MEDICINE

## 2022-12-14 PROCEDURE — 85025 COMPLETE CBC W/AUTO DIFF WBC: CPT | Performed by: INTERNAL MEDICINE

## 2022-12-14 PROCEDURE — 86140 C-REACTIVE PROTEIN: CPT | Performed by: INTERNAL MEDICINE

## 2022-12-14 PROCEDURE — 82550 ASSAY OF CK (CPK): CPT | Performed by: INTERNAL MEDICINE

## 2022-12-14 PROCEDURE — 80048 BASIC METABOLIC PNL TOTAL CA: CPT | Performed by: INTERNAL MEDICINE

## 2022-12-14 RX ADMIN — CEFTRIAXONE 1 G: 1 INJECTION, POWDER, FOR SOLUTION INTRAMUSCULAR; INTRAVENOUS at 14:30

## 2022-12-14 RX ADMIN — DAPTOMYCIN 350 MG: 500 INJECTION, POWDER, LYOPHILIZED, FOR SOLUTION INTRAVENOUS at 14:30

## 2022-12-15 ENCOUNTER — INFUSION (OUTPATIENT)
Dept: ONCOLOGY | Facility: HOSPITAL | Age: 82
End: 2022-12-15

## 2022-12-15 ENCOUNTER — HOSPITAL ENCOUNTER (OUTPATIENT)
Dept: WOUND CARE | Facility: HOSPITAL | Age: 82
Discharge: HOME | End: 2022-12-15

## 2022-12-15 ENCOUNTER — APPOINTMENT (OUTPATIENT)
Dept: WOUND CARE | Facility: HOSPITAL | Age: 82
End: 2022-12-15

## 2022-12-15 ENCOUNTER — HOSPITAL ENCOUNTER (OUTPATIENT)
Dept: ULTRASOUND IMAGING | Facility: HOSPITAL | Age: 82
Discharge: HOME | End: 2022-12-15

## 2022-12-15 VITALS
RESPIRATION RATE: 18 BRPM | HEART RATE: 75 BPM | DIASTOLIC BLOOD PRESSURE: 51 MMHG | TEMPERATURE: 98.3 F | SYSTOLIC BLOOD PRESSURE: 100 MMHG

## 2022-12-15 VITALS
OXYGEN SATURATION: 100 % | DIASTOLIC BLOOD PRESSURE: 72 MMHG | TEMPERATURE: 97.1 F | HEART RATE: 74 BPM | SYSTOLIC BLOOD PRESSURE: 131 MMHG | RESPIRATION RATE: 18 BRPM

## 2022-12-15 DIAGNOSIS — L97.322 NON-PRESSURE CHRONIC ULCER OF LEFT ANKLE WITH FAT LAYER EXPOSED: ICD-10-CM

## 2022-12-15 DIAGNOSIS — L97.909 VENOUS ULCER: ICD-10-CM

## 2022-12-15 DIAGNOSIS — L03.90 CELLULITIS, UNSPECIFIED CELLULITIS SITE: Primary | ICD-10-CM

## 2022-12-15 DIAGNOSIS — L03.119 CELLULITIS OF FOOT: Primary | ICD-10-CM

## 2022-12-15 DIAGNOSIS — I83.009 VENOUS ULCER: ICD-10-CM

## 2022-12-15 PROCEDURE — 96365 THER/PROPH/DIAG IV INF INIT: CPT

## 2022-12-15 PROCEDURE — 96368 THER/DIAG CONCURRENT INF: CPT

## 2022-12-15 PROCEDURE — 97602 WOUND(S) CARE NON-SELECTIVE: CPT

## 2022-12-15 PROCEDURE — 25010000002 DAPTOMYCIN PER 1 MG: Performed by: INTERNAL MEDICINE

## 2022-12-15 PROCEDURE — 25010000002 CEFTRIAXONE PER 250 MG: Performed by: INTERNAL MEDICINE

## 2022-12-15 RX ORDER — CASTOR OIL AND BALSAM, PERU 788; 87 MG/G; MG/G
1 OINTMENT TOPICAL AS NEEDED
OUTPATIENT
Start: 2022-12-15

## 2022-12-15 RX ORDER — LIDOCAINE HYDROCHLORIDE 20 MG/ML
JELLY TOPICAL AS NEEDED
OUTPATIENT
Start: 2022-12-15

## 2022-12-15 RX ORDER — SODIUM HYPOCHLORITE 1.25 MG/ML
1 SOLUTION TOPICAL AS NEEDED
OUTPATIENT
Start: 2022-12-15

## 2022-12-15 RX ORDER — SODIUM HYPOCHLORITE 2.5 MG/ML
1 SOLUTION TOPICAL AS NEEDED
OUTPATIENT
Start: 2022-12-15

## 2022-12-15 RX ORDER — LIDOCAINE HYDROCHLORIDE 20 MG/ML
JELLY TOPICAL AS NEEDED
Status: CANCELLED
Start: 2022-12-15

## 2022-12-15 RX ORDER — LIDOCAINE HYDROCHLORIDE 20 MG/ML
JELLY TOPICAL AS NEEDED
Start: 2022-12-15

## 2022-12-15 RX ORDER — LIDOCAINE HYDROCHLORIDE 20 MG/ML
JELLY TOPICAL AS NEEDED
Status: DISCONTINUED | OUTPATIENT
Start: 2022-12-15 | End: 2022-12-16 | Stop reason: HOSPADM

## 2022-12-15 RX ADMIN — CEFTRIAXONE 1 G: 1 INJECTION, POWDER, FOR SOLUTION INTRAMUSCULAR; INTRAVENOUS at 15:37

## 2022-12-15 RX ADMIN — DAPTOMYCIN 350 MG: 500 INJECTION, POWDER, LYOPHILIZED, FOR SOLUTION INTRAVENOUS at 15:37
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
## 2022-12-16 ENCOUNTER — HOSPITAL ENCOUNTER (OUTPATIENT)
Dept: ULTRASOUND IMAGING | Facility: HOSPITAL | Age: 82
Discharge: HOME | End: 2022-12-16
Admitting: NURSE PRACTITIONER

## 2022-12-16 ENCOUNTER — INFUSION (OUTPATIENT)
Dept: ONCOLOGY | Facility: HOSPITAL | Age: 82
End: 2022-12-16

## 2022-12-16 VITALS
DIASTOLIC BLOOD PRESSURE: 54 MMHG | SYSTOLIC BLOOD PRESSURE: 107 MMHG | HEART RATE: 74 BPM | RESPIRATION RATE: 18 BRPM | TEMPERATURE: 97.5 F | OXYGEN SATURATION: 92 %

## 2022-12-16 DIAGNOSIS — L03.90 CELLULITIS, UNSPECIFIED CELLULITIS SITE: Primary | ICD-10-CM

## 2022-12-16 LAB
ACANTHOCYTES BLD QL SMEAR: NORMAL
ALBUMIN SERPL-MCNC: 3.55 G/DL (ref 3.5–5.2)
ALBUMIN/GLOB SERPL: 1.4 G/DL
ALP SERPL-CCNC: 91 U/L (ref 39–117)
ALT SERPL W P-5'-P-CCNC: <5 U/L (ref 1–41)
ANION GAP SERPL CALCULATED.3IONS-SCNC: 10.9 MMOL/L (ref 5–15)
ANISOCYTOSIS BLD QL: NORMAL
AST SERPL-CCNC: 12 U/L (ref 1–40)
BASOPHILS # BLD AUTO: 0.03 10*3/MM3 (ref 0–0.2)
BASOPHILS NFR BLD AUTO: 0.5 % (ref 0–1.5)
BILIRUB SERPL-MCNC: 0.4 MG/DL (ref 0–1.2)
BUN SERPL-MCNC: 25 MG/DL (ref 8–23)
BUN/CREAT SERPL: 17.5 (ref 7–25)
CALCIUM SPEC-SCNC: 8.4 MG/DL (ref 8.6–10.5)
CHLORIDE SERPL-SCNC: 104 MMOL/L (ref 98–107)
CK SERPL-CCNC: 90 U/L (ref 20–200)
CO2 SERPL-SCNC: 24.1 MMOL/L (ref 22–29)
CREAT SERPL-MCNC: 1.43 MG/DL (ref 0.76–1.27)
CRP SERPL-MCNC: 0.86 MG/DL (ref 0–0.5)
DEPRECATED RDW RBC AUTO: 52.1 FL (ref 37–54)
EGFRCR SERPLBLD CKD-EPI 2021: 49.2 ML/MIN/1.73
EOSINOPHIL # BLD AUTO: 0.3 10*3/MM3 (ref 0–0.4)
EOSINOPHIL NFR BLD AUTO: 4.9 % (ref 0.3–6.2)
ERYTHROCYTE [DISTWIDTH] IN BLOOD BY AUTOMATED COUNT: 21.2 % (ref 12.3–15.4)
GLOBULIN UR ELPH-MCNC: 2.6 GM/DL
GLUCOSE SERPL-MCNC: 102 MG/DL (ref 65–99)
HCT VFR BLD AUTO: 27.8 % (ref 37.5–51)
HGB BLD-MCNC: 7.9 G/DL (ref 13–17.7)
HYPOCHROMIA BLD QL: NORMAL
IMM GRANULOCYTES # BLD AUTO: 0.02 10*3/MM3 (ref 0–0.05)
IMM GRANULOCYTES NFR BLD AUTO: 0.3 % (ref 0–0.5)
LYMPHOCYTES # BLD AUTO: 0.71 10*3/MM3 (ref 0.7–3.1)
LYMPHOCYTES NFR BLD AUTO: 11.5 % (ref 19.6–45.3)
MCH RBC QN AUTO: 19.7 PG (ref 26.6–33)
MCHC RBC AUTO-ENTMCNC: 28.4 G/DL (ref 31.5–35.7)
MCV RBC AUTO: 69.3 FL (ref 79–97)
MICROCYTES BLD QL: NORMAL
MONOCYTES # BLD AUTO: 0.73 10*3/MM3 (ref 0.1–0.9)
MONOCYTES NFR BLD AUTO: 11.8 % (ref 5–12)
NEUTROPHILS NFR BLD AUTO: 4.39 10*3/MM3 (ref 1.7–7)
NEUTROPHILS NFR BLD AUTO: 71 % (ref 42.7–76)
NRBC BLD AUTO-RTO: 0 /100 WBC (ref 0–0.2)
OVALOCYTES BLD QL SMEAR: NORMAL
PLAT MORPH BLD: NORMAL
PLATELET # BLD AUTO: 217 10*3/MM3 (ref 140–450)
PMV BLD AUTO: 10.4 FL (ref 6–12)
POLYCHROMASIA BLD QL SMEAR: NORMAL
POTASSIUM SERPL-SCNC: 4.7 MMOL/L (ref 3.5–5.2)
PROT SERPL-MCNC: 6.1 G/DL (ref 6–8.5)
RBC # BLD AUTO: 4.01 10*6/MM3 (ref 4.14–5.8)
SCHISTOCYTES BLD QL SMEAR: NORMAL
SODIUM SERPL-SCNC: 139 MMOL/L (ref 136–145)
WBC NRBC COR # BLD: 6.18 10*3/MM3 (ref 3.4–10.8)

## 2022-12-16 PROCEDURE — 96365 THER/PROPH/DIAG IV INF INIT: CPT

## 2022-12-16 PROCEDURE — 25010000002 CEFTRIAXONE PER 250 MG: Performed by: INTERNAL MEDICINE

## 2022-12-16 PROCEDURE — 85007 BL SMEAR W/DIFF WBC COUNT: CPT | Performed by: INTERNAL MEDICINE

## 2022-12-16 PROCEDURE — 82550 ASSAY OF CK (CPK): CPT | Performed by: INTERNAL MEDICINE

## 2022-12-16 PROCEDURE — 25010000002 DAPTOMYCIN PER 1 MG: Performed by: INTERNAL MEDICINE

## 2022-12-16 PROCEDURE — 86140 C-REACTIVE PROTEIN: CPT | Performed by: INTERNAL MEDICINE

## 2022-12-16 PROCEDURE — 85025 COMPLETE CBC W/AUTO DIFF WBC: CPT | Performed by: INTERNAL MEDICINE

## 2022-12-16 PROCEDURE — 93922 UPR/L XTREMITY ART 2 LEVELS: CPT

## 2022-12-16 PROCEDURE — 93922 UPR/L XTREMITY ART 2 LEVELS: CPT | Performed by: RADIOLOGY

## 2022-12-16 PROCEDURE — 96368 THER/DIAG CONCURRENT INF: CPT

## 2022-12-16 PROCEDURE — 80053 COMPREHEN METABOLIC PANEL: CPT | Performed by: INTERNAL MEDICINE

## 2022-12-16 RX ADMIN — CEFTRIAXONE SODIUM 1 G: 1 INJECTION, POWDER, FOR SOLUTION INTRAMUSCULAR; INTRAVENOUS at 08:25

## 2022-12-16 RX ADMIN — DAPTOMYCIN 350 MG: 500 INJECTION, POWDER, LYOPHILIZED, FOR SOLUTION INTRAVENOUS at 08:27

## 2022-12-19 ENCOUNTER — TRANSCRIBE ORDERS (OUTPATIENT)
Dept: ONCOLOGY | Facility: HOSPITAL | Age: 82
End: 2022-12-19

## 2023-01-06 ENCOUNTER — OFFICE VISIT (OUTPATIENT)
Dept: CARDIOLOGY | Facility: CLINIC | Age: 83
End: 2023-01-06
Payer: MEDICARE

## 2023-01-06 VITALS
HEIGHT: 70 IN | BODY MASS INDEX: 28.35 KG/M2 | SYSTOLIC BLOOD PRESSURE: 116 MMHG | DIASTOLIC BLOOD PRESSURE: 58 MMHG | OXYGEN SATURATION: 98 % | WEIGHT: 198 LBS | HEART RATE: 75 BPM

## 2023-01-06 DIAGNOSIS — Z95.2 H/O MITRAL VALVE REPLACEMENT WITH MECHANICAL VALVE: Chronic | ICD-10-CM

## 2023-01-06 DIAGNOSIS — D50.9 MICROCYTIC ANEMIA: Primary | ICD-10-CM

## 2023-01-06 DIAGNOSIS — I50.22 CHRONIC SYSTOLIC CONGESTIVE HEART FAILURE: Chronic | ICD-10-CM

## 2023-01-06 DIAGNOSIS — Z95.810 CARDIAC RESYNCHRONIZATION THERAPY DEFIBRILLATOR (CRT-D) IN PLACE: ICD-10-CM

## 2023-01-06 DIAGNOSIS — E78.5 HYPERLIPIDEMIA LDL GOAL <70: ICD-10-CM

## 2023-01-06 PROCEDURE — 93284 PRGRMG EVAL IMPLANTABLE DFB: CPT | Performed by: INTERNAL MEDICINE

## 2023-01-06 PROCEDURE — 99214 OFFICE O/P EST MOD 30 MIN: CPT | Performed by: INTERNAL MEDICINE

## 2023-01-06 RX ORDER — ROSUVASTATIN CALCIUM 10 MG/1
10 TABLET, COATED ORAL DAILY
Qty: 90 TABLET | Refills: 3 | Status: SHIPPED | OUTPATIENT
Start: 2023-01-06

## 2023-01-06 RX ORDER — FERROUS SULFATE 325(65) MG
325 TABLET ORAL
COMMUNITY
End: 2023-02-28

## 2023-01-06 NOTE — ASSESSMENT & PLAN NOTE
· Stage C HFrEF with stable NYHA class II symptoms  · Suspect fatigue symptoms related to iron deficiency anemia   · Continue carvedilol, Entresto, empagliflozin  · No spironolactone due to gynecomastia

## 2023-01-06 NOTE — ASSESSMENT & PLAN NOTE
HISTORY OF PRESENT ILLNESS 
Brandy Campos III is a 40 y.o. male. Chief Complaint Patient presents with  Complete Physical  
 
Health Maintenance Due Topic Date Due  Influenza Age 5 to Adult  08/01/2018 Influenza: up to date outside of office. HPI  
CE today - No specific concerns. Will be laid off at the end of this year and will lose health insurance, so wants to have labs done now. Hx Hyperchol - due for recheck. Taking Simvastatin 20 mg daily - started after this lab test:   
Lab Results Component Value Date/Time Cholesterol, total 287 (H) 03/16/2018 08:36 AM  
 HDL Cholesterol 63 03/16/2018 08:36 AM  
 LDL, calculated 205 (H) 03/16/2018 08:36 AM  
 VLDL, calculated 19 03/16/2018 08:36 AM  
 Triglyceride 97 03/16/2018 08:36 AM  
 
Wt Readings from Last 3 Encounters:  
11/20/18 166 lb (75.3 kg) 03/16/18 175 lb 3.2 oz (79.5 kg) 08/16/17 177 lb 14.6 oz (80.7 kg) Exercising regularly. Trying to follow a somewhat lower fat diet. Review of Systems Constitutional: Negative for fever and weight loss. HENT: Negative for congestion, hearing loss and sore throat. Eyes: Negative for blurred vision. Respiratory: Negative for cough and shortness of breath. Cardiovascular: Negative for chest pain, palpitations and leg swelling. Gastrointestinal: Negative for abdominal pain, blood in stool, constipation, diarrhea, heartburn, melena, nausea and vomiting. Genitourinary: Negative for dysuria, frequency, hematuria and urgency. Musculoskeletal: Negative for back pain, joint pain and neck pain. Neurological: Negative for dizziness, seizures, loss of consciousness, weakness and headaches. Endo/Heme/Allergies: Negative for environmental allergies. Psychiatric/Behavioral: Negative for depression and substance abuse. The patient is not nervous/anxious and does not have insomnia. Physical Exam  
Constitutional: He is oriented to person, place, and time.  He appears · 100% biventricular pacing due to complete heart block  · 8 months of battery life remaining  · Repeat device interrogation in 6 months   well-developed and well-nourished. No distress. HENT:  
Head: Normocephalic and atraumatic. Right Ear: External ear normal.  
Left Ear: External ear normal.  
Nose: Nose normal.  
Mouth/Throat: Oropharynx is clear and moist.  
Eyes: Conjunctivae are normal.  
Neck: Neck supple. No JVD present. No thyromegaly present. Cardiovascular: Normal rate, regular rhythm and normal heart sounds. Pulmonary/Chest: Effort normal and breath sounds normal. No respiratory distress. Abdominal: Soft. Bowel sounds are normal. He exhibits no distension and no mass. There is no tenderness. There is no rebound and no guarding. Musculoskeletal: He exhibits no edema. Lymphadenopathy:  
  He has no cervical adenopathy. Neurological: He is alert and oriented to person, place, and time. Skin: Skin is warm and dry. Psychiatric: He has a normal mood and affect. His behavior is normal. Judgment and thought content normal.  
Nursing note and vitals reviewed. ASSESSMENT and PLAN 
  ICD-10-CM ICD-9-CM 1. Annual physical exam Z00.00 V70.0 CBC WITH AUTOMATED DIFF  
   METABOLIC PANEL, COMPREHENSIVE  
   LIPID PANEL  
   TSH RFX ON ABNORMAL TO FREE T4  
2. Hypercholesteremia E78.00 272.0 Refill simvastatin (ZOCOR) 20 mg tablet 3. Reflux esophagitis K21.0 530.11 Controlled Continue OTC meds. Congratulated pt on weight loss success and encouraged continued efforts.

## 2023-01-06 NOTE — PROGRESS NOTES
Cardiology Outpatient Visit      Identification: Ezequiel Thompson is a 82 y.o. male who resides in [place], [place].    Reason for visit:  · Chronic systolic congestive heart failure (6 month follow up with [facility] device check.)   · CAD  · Permanent atrial fibrillation    Assessment     Problem List Items Addressed This Visit        Cardiology Problems    Chronic systolic congestive heart failure (HCC) - Primary (Chronic)    Overview     · Echocardiogram (7/10/2020): Severe LV dysfunction.  Normal functioning mechanical mitral valve.  Moderate to severe TR, mild pulmonary hypertension with RVSP 45 mmHg  · Echocardiogram (3/23/2021): LVEF 26-30% with dilated LV.  Mechanical mitral valve with mild MR.  RVSP 45-55 mmHg         Current Assessment & Plan     · Stage C HFrEF with stable NYHA class II symptoms  · Suspect fatigue symptoms related to iron deficiency anemia   · Continue carvedilol, Entresto, empagliflozin  · No spironolactone due to gynecomastia         Hyperlipidemia LDL goal <70 (Chronic)    Overview     • High intensity statin therapy indicated given the presence of CAD         Current Assessment & Plan     · Continue rosuvastatin         Relevant Medications    rosuvastatin (CRESTOR) 10 MG tablet    Cardiac resynchronization therapy defibrillator (CRT-D) in place    Overview     · [employer] ICD for primary prevention         Current Assessment & Plan     · 100% biventricular pacing due to complete heart block  · 8 months of battery life remaining  · Follow-up with Benedicto Nj in EP clinic in 6 months in preparation for ICD generator change            Other    Mechanical mitral valve in situ (Chronic)    Overview     · Previous mechanical MVR  · Echocardiogram (7/10/2020): Severe LV dysfunction.  Normal functioning mechanical mitral valve.  Moderate to severe TR, mild pulmonary hypertension with RVSP 45 mmHg  · Echocardiogram (3/23/2021): LVEF 26-30% with dilated LV.  Mechanical mitral valve with  mild MR.  RVSP 45-55 mmHg         Current Assessment & Plan     · Normal functioning mechanical valve on echo last year  · High risk for valve thrombosis off of anticoagulation given mechanical mitral valve in mitral position.  · Continue warfarin with goal INR 2.5-3  · GI evaluation pending in March         Microcytic anemia    Current Assessment & Plan     · Suspect fatigue primarily related to microcytic anemia  · Anemia work-up pending  · Follow-up with GI as scheduled  · High risk for valve thrombosis off of anticoagulation given mechanical mitral valve in mitral position.         Relevant Medications    ferrous sulfate 325 (65 FE) MG tablet       Plan   • Continue present medical therapy  • Agree with iron transfusion for microcytic anemia  • Proceed with GI evaluation in March for cirrhosis and microcytic anemia      Follow-up   • Follow-up with Benedicto Nj in EP clinic in 6 months in preparation for ICD generator change (patient is pacemaker dependent)  Return in about 1 year (around 1/6/2024).        Kim Smith returns the office today.  He had a stable clinical course from a cardiovascular standpoint.  He denies any new chest discomfort or shortness of breath.  No TIA or stroke symptoms.      Does have generalized fatigue symptoms.  Blood work performed after Christmas shows iron deficiency anemia.  He is scheduled to get iron infusion next.  As appointment with gastroenterology March 21, 2023.        Review of Systems   Constitutional: Positive for malaise/fatigue.   Cardiovascular: Negative.    Respiratory: Negative.        Current Outpatient Medications   Medication Instructions   • allopurinol (ZYLOPRIM) 300 mg, Oral, Daily   • ammonium lactate (Lac-Hydrin) 12 % cream 1 application, Topical, As Needed   • aspirin 81 mg, Oral, Daily   • bumetanide (BUMEX) 1 mg, Oral, 2 Times Daily   • carvedilol (COREG) 12.5 mg, Oral, 2 Times Daily With Meals   • empagliflozin (JARDIANCE) 10 mg, Oral, Daily    • eplerenone (INSPRA) 25 mg, Oral, Daily   • Ergocalciferol (VITAMIN D2 PO) 50,000 Units, Oral, Weekly   • ferrous sulfate 325 mg, Oral, Daily With Breakfast   • potassium chloride (MICRO-K) 10 MEQ CR capsule 20 mEq, Oral, 3 Times Daily, 9 tablets daily   • rosuvastatin (CRESTOR) 10 mg, Oral, Daily   • sacubitril-valsartan (Entresto) 24-26 MG tablet 1 tablet, Oral, 2 Times Daily   • warfarin (COUMADIN) 4 mg, Oral, Daily Warfarin       Objective     /58 (BP Location: Left arm, Patient Position: Sitting, Cuff Size: Adult)   Pulse 75   Ht 177.8 cm (70\")   Wt 89.8 kg (198 lb)   SpO2 98%   BMI 28.41 kg/m²       Constitutional:       Appearance: Healthy appearance. Well-developed.   Eyes:      General: Lids are normal. No scleral icterus.  HENT:      Head: Normocephalic and atraumatic.   Neck:      Thyroid: No thyroid mass.      Vascular: No carotid bruit or JVD. JVD normal.   Pulmonary:      Effort: Pulmonary effort is normal.      Breath sounds: Normal breath sounds.   Cardiovascular:      Normal rate. Regular rhythm.      Murmurs: There is no murmur.      No gallop.      Comments: Mechanical S1/S2  Musculoskeletal:      Extremities: No clubbing present.Skin:     General: Skin is warm and dry. There is no cyanosis.   Neurological:      General: No focal deficit present.      Mental Status: Alert.   Psychiatric:         Attention and Perception: Attention normal.         Behavior: Behavior normal. Behavior is cooperative.         Result Review  (reviewed with patient):    [employer] CRT ICD interrogated.  VVIR with complete underlying heart block.  8 months of battery life remaining.  Actively transmitting.  No therapies delivered.  Please see device interrogation sheet for details          Lab Results   Component Value Date    GLUCOSE 102 (H) 12/16/2022    BUN 25 (H) 12/16/2022    CREATININE 1.43 (H) 12/16/2022    EGFRIFNONA 50 (L) 02/01/2022    BCR 17.5 12/16/2022    K 4.7 12/16/2022    CO2 24.1  12/16/2022    CALCIUM 8.4 (L) 12/16/2022    ALBUMIN 3.55 12/16/2022    AST 12 12/16/2022    ALT <5 12/16/2022     Lab Results   Component Value Date    WBC 6.18 12/16/2022    HGB 7.9 (L) 12/16/2022    HCT 27.8 (L) 12/16/2022    MCV 69.3 (L) 12/16/2022     12/16/2022       Buddy Brothers MD, FACC, [place]  1/6/2023

## 2023-01-06 NOTE — ASSESSMENT & PLAN NOTE
· 100% biventricular pacing due to complete heart block  · 8 months of battery life remaining  · Follow-up with Benedicto Nj in EP clinic in 6 months in preparation for ICD generator change

## 2023-01-06 NOTE — ASSESSMENT & PLAN NOTE
· Normal functioning mechanical valve on echo last year  · High risk for valve thrombosis off of anticoagulation given mechanical mitral valve in mitral position.  · Continue warfarin with goal INR 2.5-3  · GI evaluation pending in March

## 2023-01-06 NOTE — ASSESSMENT & PLAN NOTE
· Suspect fatigue primarily related to microcytic anemia  · Anemia work-up pending  · Follow-up with GI as scheduled  · High risk for valve thrombosis off of anticoagulation given mechanical mitral valve in mitral position.

## 2023-01-09 PROBLEM — K90.9 MALABSORPTION: Status: ACTIVE | Noted: 2023-01-09

## 2023-01-09 PROBLEM — D50.9 IRON DEFICIENCY ANEMIA, UNSPECIFIED: Status: ACTIVE | Noted: 2023-01-09

## 2023-01-10 ENCOUNTER — INFUSION (OUTPATIENT)
Dept: ONCOLOGY | Facility: HOSPITAL | Age: 83
End: 2023-01-10
Payer: MEDICARE

## 2023-01-10 VITALS
HEART RATE: 75 BPM | OXYGEN SATURATION: 100 % | TEMPERATURE: 97 F | SYSTOLIC BLOOD PRESSURE: 113 MMHG | RESPIRATION RATE: 16 BRPM | DIASTOLIC BLOOD PRESSURE: 56 MMHG

## 2023-01-10 DIAGNOSIS — D50.9 IRON DEFICIENCY ANEMIA, UNSPECIFIED IRON DEFICIENCY ANEMIA TYPE: Primary | ICD-10-CM

## 2023-01-10 DIAGNOSIS — K90.9 INTESTINAL MALABSORPTION, UNSPECIFIED TYPE: ICD-10-CM

## 2023-01-10 PROCEDURE — 96365 THER/PROPH/DIAG IV INF INIT: CPT

## 2023-01-10 PROCEDURE — 96374 THER/PROPH/DIAG INJ IV PUSH: CPT

## 2023-01-10 PROCEDURE — 25010000002 FERRIC CARBOXYMALTOSE 750 MG/15ML SOLUTION 15 ML VIAL: Performed by: INTERNAL MEDICINE

## 2023-01-10 RX ADMIN — FERRIC CARBOXYMALTOSE INJECTION 750 MG: 50 INJECTION, SOLUTION INTRAVENOUS at 11:56

## 2023-01-12 PROCEDURE — 93295 DEV INTERROG REMOTE 1/2/MLT: CPT | Performed by: STUDENT IN AN ORGANIZED HEALTH CARE EDUCATION/TRAINING PROGRAM

## 2023-01-12 PROCEDURE — 93296 REM INTERROG EVL PM/IDS: CPT | Performed by: STUDENT IN AN ORGANIZED HEALTH CARE EDUCATION/TRAINING PROGRAM

## 2023-02-28 ENCOUNTER — TELEPHONE (OUTPATIENT)
Dept: CARDIOLOGY | Facility: CLINIC | Age: 83
End: 2023-02-28

## 2023-02-28 ENCOUNTER — OFFICE VISIT (OUTPATIENT)
Dept: CARDIOLOGY | Facility: CLINIC | Age: 83
End: 2023-02-28
Payer: MEDICARE

## 2023-02-28 VITALS
HEIGHT: 70 IN | RESPIRATION RATE: 20 BRPM | BODY MASS INDEX: 28.49 KG/M2 | DIASTOLIC BLOOD PRESSURE: 62 MMHG | WEIGHT: 199 LBS | OXYGEN SATURATION: 100 % | HEART RATE: 76 BPM | SYSTOLIC BLOOD PRESSURE: 118 MMHG

## 2023-02-28 DIAGNOSIS — Z95.810 CARDIAC RESYNCHRONIZATION THERAPY DEFIBRILLATOR (CRT-D) IN PLACE: ICD-10-CM

## 2023-02-28 DIAGNOSIS — I48.21 PERMANENT ATRIAL FIBRILLATION: Primary | Chronic | ICD-10-CM

## 2023-02-28 DIAGNOSIS — I48.21 PERMANENT ATRIAL FIBRILLATION: Chronic | ICD-10-CM

## 2023-02-28 DIAGNOSIS — I50.22 CHRONIC SYSTOLIC CONGESTIVE HEART FAILURE: Chronic | ICD-10-CM

## 2023-02-28 DIAGNOSIS — I10 ESSENTIAL HYPERTENSION: Primary | Chronic | ICD-10-CM

## 2023-02-28 DIAGNOSIS — I25.119 CORONARY ARTERY DISEASE INVOLVING NATIVE CORONARY ARTERY OF NATIVE HEART WITH ANGINA PECTORIS: Chronic | ICD-10-CM

## 2023-02-28 PROCEDURE — 93283 PRGRMG EVAL IMPLANTABLE DFB: CPT | Performed by: PHYSICIAN ASSISTANT

## 2023-02-28 PROCEDURE — 99213 OFFICE O/P EST LOW 20 MIN: CPT | Performed by: PHYSICIAN ASSISTANT

## 2023-02-28 NOTE — PROGRESS NOTES
Ezequiel Luis Thompson  1940      [facility]     [facility]  [address] [place] [address]    Chief Complaint   Patient presents with   • Follow-up     Chronic systolic congestive heart failure       Problem List:   Problem List Items Addressed This Visit        Cardiac and Vasculature    Cardiac resynchronization therapy defibrillator (CRT-D) in place    Overview     · [employer] ICD for primary prevention         Essential hypertension - Primary (Chronic)    Overview     Target blood pressure <130/80 mmHg  · Renal duplex (2/1/2022):  Right kidney:  Unremarkable. Left kidney: 1.7 cm simple left renal cyst. No acute findings in the retroperitoneum.         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)    Overview     · CABG in [place], [place]         Permanent atrial fibrillation (HCC) (Chronic)    Overview     · [id number] 6 (age >75, CHF, CAD, HTN, CVA)          Allergies  Allergies   Allergen Reactions   • Spironolactone Other (See Comments)     gynocomastia   • Iodinated Contrast Media Hives   • Iodine Hives   • Levothyroxine Dizziness       Current Medications    Current Outpatient Medications:   •  allopurinol (ZYLOPRIM) 100 MG tablet, Take 3 tablets by mouth Daily., Disp: , Rfl:   •  ammonium lactate (Lac-Hydrin) 12 % cream, Apply 1 application topically to the appropriate area as directed As Needed for Dry Skin., Disp: 385 g, Rfl: 3  •  aspirin (aspirin) 81 MG EC tablet, Take 1 tablet by mouth Daily., Disp: 90 tablet, Rfl: 3  •  bumetanide (BUMEX) 1 MG tablet, Take 1 tablet by mouth 2 (Two) Times a Day., Disp: 180 tablet, Rfl: 1  •  carvedilol (COREG) 12.5 MG tablet, Take 1 tablet by mouth 2 (Two) Times a Day With Meals., Disp: 180 tablet, Rfl: 3  •  empagliflozin (Jardiance) 10 MG tablet tablet, Take 1 tablet by mouth Daily., Disp: 90 tablet, Rfl: 3  •  eplerenone (INSPRA) 25 MG  "tablet, Take 1 tablet by mouth Daily., Disp: 90 tablet, Rfl: 3  •  Ergocalciferol (VITAMIN D2 PO), Take 50,000 Units by mouth 1 (One) Time Per Week., Disp: , Rfl:   •  potassium chloride (MICRO-K) 10 MEQ CR capsule, Take 2 capsules by mouth 3 (Three) Times a Day. 9 tablets daily, Disp: , Rfl:   •  sacubitril-valsartan (Entresto) 24-26 MG tablet, Take 1 tablet by mouth 2 (Two) Times a Day., Disp: 180 tablet, Rfl: 3  •  warfarin (COUMADIN) 4 MG tablet, Take 1 tablet by mouth Daily., Disp: , Rfl:   •  rosuvastatin (CRESTOR) 10 MG tablet, Take 1 tablet by mouth Daily., Disp: 90 tablet, Rfl: 3    History of Present Illness   HPI    Pt presents for follow up of chronic systolic heart failure, ischemic or myopathy, valvular heart disease, Buggl bivicd.. Since we last saw the pt, pt denies any tachypalpitations episodes, SOB, CP, LH, and dizziness. Denies any hospitalizations, ER visits, bleeding, or TIA/CVA symptoms. Overall feels well.      Vitals:    02/28/23 1352   BP: 118/62   BP Location: Left arm   Patient Position: Sitting   Cuff Size: Adult   Pulse: 76   Resp: 20   SpO2: 100%   Weight: 90.3 kg (199 lb)   Height: 177.8 cm (70\")     Body mass index is 28.55 kg/m².  PE:  General: NAD  Neck: no JVD, no carotid bruits, no TM  Heart RRR, NL mechanical S1 normal S2 2/6 stock ejection murmur.  Lungs: CTA, no wheezes, rhonchi, or rales  Abd: soft, non-tender, NL BS  Ext: Chronic 3 plus edema, stasis dermitis.    c  Psych: normal mood and affect    Diagnostic Data:      Procedures    1. Essential hypertension    2. Coronary artery disease involving native coronary artery of native heart with angina pectoris (HCC)    3. Permanent atrial fibrillation (HCC)    4. Cardiac resynchronization therapy defibrillator (CRT-D) in place          Plan:  1) ICM, SHF.   Continue present medications. Continue GDMT.  He is currently on carvedilol, Entresto, Jardiance and daily diuretics.  He reports he has been mainly euvolemic he " has had no heart failure exacerbation events.  Last echocardiogram 2021 EF 30% prosthetic mitral valve present with moderate MR.    2) CRT: BSC BIVICD(Implanted by Dr. Mohan): Interrogation today reveals normally functioning device, he has reached JAYESH.  His mode is set at VVIR rate 75.  He is BiV paced 100%.  Normal thresholds impedance.  Complete heart block underlying rhythm.    3) HTN: Controlled on current medical therapy    4)VHD: Mechanical MVR.  Chronic Coumadin therapy.  Patient reports stable INRs.  Followed by Dr. Buddy Brothers.    5) Permanent atrial fibrillation: Rate controlled patient is 100% paced.  On chronic Coumadin therapy.    6)LE Chronic Edema,Left lateral ankle venous ulcer,    right LE vein stripping with leg wraps. followed by Patti ANDRADE      Electronically signed by ALEX Lehman, 02/28/23, 2:27 PM EST.

## 2023-02-28 NOTE — TELEPHONE ENCOUNTER
Per [employer] remote cardiac device transmission received today, 2/28/2023:     BIV ICD battery @ JAYESH as of 2/27/2023.    Notified patient & spouse that ICD battery has reached replacement time. Pt has an appointment today with Dr. Nj-reiterated the appointment date/time/location with both of them.     Pt is taking warfarin, please advise for gen change.

## 2023-03-07 ENCOUNTER — PREP FOR SURGERY (OUTPATIENT)
Dept: OTHER | Facility: HOSPITAL | Age: 83
End: 2023-03-07
Payer: MEDICARE

## 2023-03-07 DIAGNOSIS — Z45.02 ICD (IMPLANTABLE CARDIOVERTER-DEFIBRILLATOR) BATTERY DEPLETION: Primary | ICD-10-CM

## 2023-03-07 RX ORDER — ONDANSETRON 2 MG/ML
4 INJECTION INTRAMUSCULAR; INTRAVENOUS EVERY 6 HOURS PRN
Status: CANCELLED | OUTPATIENT
Start: 2023-03-07

## 2023-03-07 RX ORDER — ACETAMINOPHEN 325 MG/1
650 TABLET ORAL EVERY 4 HOURS PRN
Status: CANCELLED | OUTPATIENT
Start: 2023-03-07

## 2023-03-07 RX ORDER — SODIUM CHLORIDE 0.9 % (FLUSH) 0.9 %
10 SYRINGE (ML) INJECTION AS NEEDED
Status: CANCELLED | OUTPATIENT
Start: 2023-03-07

## 2023-03-07 RX ORDER — NITROGLYCERIN 0.4 MG/1
0.4 TABLET SUBLINGUAL
Status: CANCELLED | OUTPATIENT
Start: 2023-03-07

## 2023-03-07 RX ORDER — CEFAZOLIN SODIUM 2 G/100ML
2 INJECTION, SOLUTION INTRAVENOUS ONCE
Status: CANCELLED | OUTPATIENT
Start: 2023-03-07 | End: 2023-03-07

## 2023-03-07 RX ORDER — SODIUM CHLORIDE 9 MG/ML
40 INJECTION, SOLUTION INTRAVENOUS AS NEEDED
Status: CANCELLED | OUTPATIENT
Start: 2023-03-07

## 2023-03-07 RX ORDER — SODIUM CHLORIDE 0.9 % (FLUSH) 0.9 %
3 SYRINGE (ML) INJECTION EVERY 12 HOURS SCHEDULED
Status: CANCELLED | OUTPATIENT
Start: 2023-03-07

## 2023-03-22 DIAGNOSIS — I50.22 CHRONIC SYSTOLIC CONGESTIVE HEART FAILURE: Chronic | ICD-10-CM

## 2023-03-22 RX ORDER — BUMETANIDE 1 MG/1
1 TABLET ORAL 2 TIMES DAILY
Qty: 180 TABLET | Refills: 1 | Status: SHIPPED | OUTPATIENT
Start: 2023-03-22

## 2023-03-22 NOTE — TELEPHONE ENCOUNTER
Lab Results   Component Value Date    GLUCOSE 102 (H) 12/16/2022    BUN 25 (H) 12/16/2022    CREATININE 1.43 (H) 12/16/2022    EGFR 49.2 (L) 12/16/2022    BCR 17.5 12/16/2022    K 4.7 12/16/2022    CO2 24.1 12/16/2022    CALCIUM 8.4 (L) 12/16/2022    ALBUMIN 3.55 12/16/2022    BILITOT 0.4 12/16/2022    AST 12 12/16/2022    ALT <5 12/16/2022

## 2023-03-27 ENCOUNTER — HOSPITAL ENCOUNTER (OUTPATIENT)
Facility: HOSPITAL | Age: 83
Setting detail: HOSPITAL OUTPATIENT SURGERY
Discharge: HOME | End: 2023-03-27
Attending: STUDENT IN AN ORGANIZED HEALTH CARE EDUCATION/TRAINING PROGRAM | Admitting: STUDENT IN AN ORGANIZED HEALTH CARE EDUCATION/TRAINING PROGRAM
Payer: MEDICARE

## 2023-03-27 VITALS
HEIGHT: 70 IN | WEIGHT: 194.45 LBS | OXYGEN SATURATION: 97 % | HEART RATE: 74 BPM | TEMPERATURE: 97 F | DIASTOLIC BLOOD PRESSURE: 72 MMHG | SYSTOLIC BLOOD PRESSURE: 127 MMHG | RESPIRATION RATE: 19 BRPM | BODY MASS INDEX: 27.84 KG/M2

## 2023-03-27 DIAGNOSIS — I50.22 CHRONIC SYSTOLIC CONGESTIVE HEART FAILURE: ICD-10-CM

## 2023-03-27 DIAGNOSIS — I48.21 PERMANENT ATRIAL FIBRILLATION: ICD-10-CM

## 2023-03-27 DIAGNOSIS — Z45.02 ICD (IMPLANTABLE CARDIOVERTER-DEFIBRILLATOR) BATTERY DEPLETION: ICD-10-CM

## 2023-03-27 LAB
ANION GAP SERPL CALCULATED.3IONS-SCNC: 8 MMOL/L (ref 5–15)
BUN SERPL-MCNC: 24 MG/DL (ref 8–23)
BUN/CREAT SERPL: 21.1 (ref 7–25)
CALCIUM SPEC-SCNC: 8.8 MG/DL (ref 8.6–10.5)
CHLORIDE SERPL-SCNC: 102 MMOL/L (ref 98–107)
CO2 SERPL-SCNC: 28 MMOL/L (ref 22–29)
CREAT SERPL-MCNC: 1.14 MG/DL (ref 0.76–1.27)
DEPRECATED RDW RBC AUTO: 62.3 FL (ref 37–54)
EGFRCR SERPLBLD CKD-EPI 2021: 64.6 ML/MIN/1.73
ERYTHROCYTE [DISTWIDTH] IN BLOOD BY AUTOMATED COUNT: 19.1 % (ref 12.3–15.4)
GLUCOSE SERPL-MCNC: 101 MG/DL (ref 65–99)
HCT VFR BLD AUTO: 38.9 % (ref 37.5–51)
HGB BLD-MCNC: 11.9 G/DL (ref 13–17.7)
INR PPP: 1.5 (ref 0.84–1.13)
MAGNESIUM SERPL-MCNC: 2.2 MG/DL (ref 1.6–2.4)
MCH RBC QN AUTO: 26.6 PG (ref 26.6–33)
MCHC RBC AUTO-ENTMCNC: 30.6 G/DL (ref 31.5–35.7)
MCV RBC AUTO: 87 FL (ref 79–97)
PLATELET # BLD AUTO: 184 10*3/MM3 (ref 140–450)
PMV BLD AUTO: 10.7 FL (ref 6–12)
POTASSIUM SERPL-SCNC: 4.6 MMOL/L (ref 3.5–5.2)
PROTHROMBIN TIME: 18.1 SECONDS (ref 11.4–14.4)
RBC # BLD AUTO: 4.47 10*6/MM3 (ref 4.14–5.8)
SODIUM SERPL-SCNC: 138 MMOL/L (ref 136–145)
WBC NRBC COR # BLD: 5.13 10*3/MM3 (ref 3.4–10.8)

## 2023-03-27 PROCEDURE — 25010000002 CEFAZOLIN IN DEXTROSE 2-4 GM/100ML-% SOLUTION: Performed by: PHYSICIAN ASSISTANT

## 2023-03-27 PROCEDURE — 25010000002 FENTANYL CITRATE (PF) 50 MCG/ML SOLUTION: Performed by: STUDENT IN AN ORGANIZED HEALTH CARE EDUCATION/TRAINING PROGRAM

## 2023-03-27 PROCEDURE — 99152 MOD SED SAME PHYS/QHP 5/>YRS: CPT | Performed by: STUDENT IN AN ORGANIZED HEALTH CARE EDUCATION/TRAINING PROGRAM

## 2023-03-27 PROCEDURE — 85027 COMPLETE CBC AUTOMATED: CPT | Performed by: PHYSICIAN ASSISTANT

## 2023-03-27 PROCEDURE — 99153 MOD SED SAME PHYS/QHP EA: CPT | Performed by: STUDENT IN AN ORGANIZED HEALTH CARE EDUCATION/TRAINING PROGRAM

## 2023-03-27 PROCEDURE — C1882 AICD, OTHER THAN SING/DUAL: HCPCS | Performed by: STUDENT IN AN ORGANIZED HEALTH CARE EDUCATION/TRAINING PROGRAM

## 2023-03-27 PROCEDURE — 33264 RMVL & RPLCMT DFB GEN MLT LD: CPT | Performed by: STUDENT IN AN ORGANIZED HEALTH CARE EDUCATION/TRAINING PROGRAM

## 2023-03-27 PROCEDURE — 25010000002 MIDAZOLAM PER 1 MG: Performed by: STUDENT IN AN ORGANIZED HEALTH CARE EDUCATION/TRAINING PROGRAM

## 2023-03-27 PROCEDURE — 83735 ASSAY OF MAGNESIUM: CPT | Performed by: PHYSICIAN ASSISTANT

## 2023-03-27 PROCEDURE — 25010000002 ONDANSETRON PER 1 MG: Performed by: STUDENT IN AN ORGANIZED HEALTH CARE EDUCATION/TRAINING PROGRAM

## 2023-03-27 PROCEDURE — C1889 IMPLANT/INSERT DEVICE, NOC: HCPCS | Performed by: STUDENT IN AN ORGANIZED HEALTH CARE EDUCATION/TRAINING PROGRAM

## 2023-03-27 PROCEDURE — 85610 PROTHROMBIN TIME: CPT | Performed by: STUDENT IN AN ORGANIZED HEALTH CARE EDUCATION/TRAINING PROGRAM

## 2023-03-27 PROCEDURE — 80048 BASIC METABOLIC PNL TOTAL CA: CPT | Performed by: PHYSICIAN ASSISTANT

## 2023-03-27 DEVICE — HEMOST ABS SURGICEL PWDR 3GM: Type: IMPLANTABLE DEVICE | Site: HEART | Status: FUNCTIONAL

## 2023-03-27 DEVICE — ENV ANTIBAC TYRX NEURO ABS LG: Type: IMPLANTABLE DEVICE | Site: HEART | Status: FUNCTIONAL

## 2023-03-27 DEVICE — CARDIAC RESYNCHRONIZATION THERAPY DEFIBRILLATOR
Type: IMPLANTABLE DEVICE | Site: HEART | Status: FUNCTIONAL
Brand: MOMENTUM™ CRT-D

## 2023-03-27 RX ORDER — SODIUM CHLORIDE 0.9 % (FLUSH) 0.9 %
10 SYRINGE (ML) INJECTION AS NEEDED
Status: DISCONTINUED | OUTPATIENT
Start: 2023-03-27 | End: 2023-03-27 | Stop reason: HOSPADM

## 2023-03-27 RX ORDER — SODIUM CHLORIDE 9 MG/ML
40 INJECTION, SOLUTION INTRAVENOUS AS NEEDED
Status: DISCONTINUED | OUTPATIENT
Start: 2023-03-27 | End: 2023-03-27 | Stop reason: HOSPADM

## 2023-03-27 RX ORDER — SODIUM CHLORIDE 0.9 % (FLUSH) 0.9 %
3 SYRINGE (ML) INJECTION EVERY 12 HOURS SCHEDULED
Status: DISCONTINUED | OUTPATIENT
Start: 2023-03-27 | End: 2023-03-27 | Stop reason: HOSPADM

## 2023-03-27 RX ORDER — ACETAMINOPHEN 650 MG/1
650 SUPPOSITORY RECTAL EVERY 4 HOURS PRN
Status: DISCONTINUED | OUTPATIENT
Start: 2023-03-27 | End: 2023-03-27 | Stop reason: HOSPADM

## 2023-03-27 RX ORDER — MIDAZOLAM HYDROCHLORIDE 1 MG/ML
INJECTION INTRAMUSCULAR; INTRAVENOUS
Status: DISCONTINUED | OUTPATIENT
Start: 2023-03-27 | End: 2023-03-27 | Stop reason: HOSPADM

## 2023-03-27 RX ORDER — NITROGLYCERIN 0.4 MG/1
0.4 TABLET SUBLINGUAL
Status: DISCONTINUED | OUTPATIENT
Start: 2023-03-27 | End: 2023-03-27 | Stop reason: HOSPADM

## 2023-03-27 RX ORDER — CEFAZOLIN SODIUM 2 G/100ML
2 INJECTION, SOLUTION INTRAVENOUS ONCE
Status: COMPLETED | OUTPATIENT
Start: 2023-03-27 | End: 2023-03-27

## 2023-03-27 RX ORDER — SODIUM CHLORIDE 9 MG/ML
INJECTION, SOLUTION INTRAVENOUS
Status: COMPLETED | OUTPATIENT
Start: 2023-03-27 | End: 2023-03-27

## 2023-03-27 RX ORDER — BUPIVACAINE HYDROCHLORIDE 2.5 MG/ML
INJECTION, SOLUTION INFILTRATION; PERINEURAL
Status: DISCONTINUED | OUTPATIENT
Start: 2023-03-27 | End: 2023-03-27 | Stop reason: HOSPADM

## 2023-03-27 RX ORDER — ONDANSETRON 2 MG/ML
INJECTION INTRAMUSCULAR; INTRAVENOUS
Status: DISCONTINUED | OUTPATIENT
Start: 2023-03-27 | End: 2023-03-27 | Stop reason: HOSPADM

## 2023-03-27 RX ORDER — FENTANYL CITRATE 50 UG/ML
INJECTION, SOLUTION INTRAMUSCULAR; INTRAVENOUS
Status: DISCONTINUED | OUTPATIENT
Start: 2023-03-27 | End: 2023-03-27 | Stop reason: HOSPADM

## 2023-03-27 RX ORDER — LIDOCAINE HYDROCHLORIDE 5 MG/ML
INJECTION, SOLUTION INFILTRATION; PERINEURAL
Status: DISCONTINUED | OUTPATIENT
Start: 2023-03-27 | End: 2023-03-27 | Stop reason: HOSPADM

## 2023-03-27 RX ORDER — ACETAMINOPHEN 325 MG/1
650 TABLET ORAL EVERY 4 HOURS PRN
Status: DISCONTINUED | OUTPATIENT
Start: 2023-03-27 | End: 2023-03-27 | Stop reason: HOSPADM

## 2023-03-27 RX ORDER — ONDANSETRON 2 MG/ML
4 INJECTION INTRAMUSCULAR; INTRAVENOUS EVERY 6 HOURS PRN
Status: DISCONTINUED | OUTPATIENT
Start: 2023-03-27 | End: 2023-03-27 | Stop reason: HOSPADM

## 2023-03-27 RX ADMIN — CEFAZOLIN SODIUM 2 G: 2 INJECTION, SOLUTION INTRAVENOUS at 16:33

## 2023-03-27 NOTE — INTERVAL H&P NOTE
Pre-procedure report  Cardiovascular Laboratory  [facility]    Patient:  Ezequiel Thompson  :  1940  PCP:  Tram Mcgee MD  PHONE:  [phone]    DATE: 3/27/2023    Chief Complaint: Biventricular ICD generator change      Stress test within last 6 months:   no    Previous cardiac catheterization: yes, remotely in [place]    Echocardiogram : EF 30% prosthetic mitral valve present with moderate MR.    Allergies:       Allergies   Allergen Reactions    Spironolactone Other (See Comments)     gynocomastia    Iodinated Contrast Media Hives    Iodine Hives    Levothyroxine Dizziness       MEDICATIONS:  Prior to Admission medications    Medication Sig Start Date End Date Taking? Authorizing Provider   allopurinol (ZYLOPRIM) 100 MG tablet Take 3 tablets by mouth Daily.    Karol Zurita MD   ammonium lactate (Lac-Hydrin) 12 % cream Apply 1 application topically to the appropriate area as directed As Needed for Dry Skin. 22   Patti Apodaca APRN   aspirin (aspirin) 81 MG EC tablet Take 1 tablet by mouth Daily. 22   Estuardo Brothers IV, MD   bumetanide (BUMEX) 1 MG tablet Take 1 tablet by mouth 2 (Two) Times a Day. 3/22/23   Estuardo Brothers IV, MD   carvedilol (COREG) 12.5 MG tablet Take 1 tablet by mouth 2 (Two) Times a Day With Meals. 22   Estuardo Brothers IV, MD   empagliflozin (Jardiance) 10 MG tablet tablet Take 1 tablet by mouth Daily. 22   Estuardo Brothers IV, MD   eplerenone (INSPRA) 25 MG tablet Take 1 tablet by mouth Daily. 22   Estuardo Brothers IV, MD   Ergocalciferol (VITAMIN D2 PO) Take 50,000 Units by mouth 1 (One) Time Per Week.    Karol Zurita MD   potassium chloride (MICRO-K) 10 MEQ CR capsule Take 2 capsules by mouth 3 (Three) Times a Day. 9 tablets daily    Karol Zurita MD   rosuvastatin (CRESTOR) 10 MG tablet Take 1 tablet by mouth Daily. 23   Estuardo Brothers  Robel LYON IV, MD   sacubitril-valsartan (Entresto) 24-26 MG tablet Take 1 tablet by mouth 2 (Two) Times a Day. 5/6/22   Estuardo Brothers IV, MD   warfarin (COUMADIN) 4 MG tablet Take 1 tablet by mouth Daily.    Provider, MD Karol       Past medical & surgical history, social and family history reviewed in the electronic medical record.    Physical Exam:    Vitals: There were no vitals filed for this visit. There were no vitals filed for this visit.There is no height or weight on file to calculate BMI.    GENERAL: No apparent distress.  No significant changes since last exam.  CHEST: Clear to auscultation bilaterally no stridor no wheeze.  CV: S1, S2, Irregular with grade 2/6 murmurs, no Rubs or Gallops  EXTREMITIES: trace edema.                Lab Results   Component Value Date    AST 12 12/16/2022    ALT <5 12/16/2022           IMPRESSION:  [facility] for biventricular ICD generator change; procedure, risks, and complications discussed with the patient and he is agreeable to proceed.  He denies any chest pain, shortness of breath, palpitations, presyncope, or syncope.  He has not stopped Coumadin but took half his normal dose the past 2 days.  His primary care physician normally manages this.  Would continue GDMT for ischemic cardiomyopathy.  On patient's last device check, he was shown to be biventricular paced 100% with underlying rhythm CHB.    PLAN:  Procedure to perform: Biventricular ICD generator change   Wound check in 1 week in the [place] office  Follow-up in 3 months with Dr. Nj       Electronically signed by RAYMOND Perez, 03/27/23, 11:17 AM EDT.

## 2023-04-03 ENCOUNTER — OFFICE VISIT (OUTPATIENT)
Dept: CARDIOLOGY | Facility: CLINIC | Age: 83
End: 2023-04-03
Payer: MEDICARE

## 2023-04-03 DIAGNOSIS — I48.21 PERMANENT ATRIAL FIBRILLATION: Primary | Chronic | ICD-10-CM

## 2023-04-03 PROCEDURE — 99024 POSTOP FOLLOW-UP VISIT: CPT | Performed by: STUDENT IN AN ORGANIZED HEALTH CARE EDUCATION/TRAINING PROGRAM

## 2023-04-03 NOTE — PROGRESS NOTES
2023    Ezequiel Thompson, : 1940      Fever: No    Temperature if indicated:     Wound Location: Left Infraclavicular    Dressing Removed: Removed by MA/RN      Old Dressing Appearance:  Clean, dry    Wound Appearance: Redness []                  Drainage []                  Culture obtained []        Color: Clear     Consistency: na     Amount: none         Gloves used, wound cleansed with sterile 4x4 and peroxide [x]       MD notified []     MD orders:     Antibiotic started []      If checked, type     Other:       Appointment for follow-up scheduled for 3 months post procedure [x]    Future Appointments   Date Time Provider Department Center   4/3/2023  2:00 PM WOUND CHECK [facility] EU[facility]   2023  1:30 PM Benedicto Nj MD [facility] LNDN [facility]   2024  1:30 PM Estuardo Brothers IV, MD [facility] ROSEMARY ROSEMARY Teague MA, 23      MD Signature:______________________________ Completed By/Date:

## 2023-04-13 PROCEDURE — 93295 DEV INTERROG REMOTE 1/2/MLT: CPT | Performed by: STUDENT IN AN ORGANIZED HEALTH CARE EDUCATION/TRAINING PROGRAM

## 2023-04-13 PROCEDURE — 93296 REM INTERROG EVL PM/IDS: CPT | Performed by: STUDENT IN AN ORGANIZED HEALTH CARE EDUCATION/TRAINING PROGRAM

## 2023-07-13 PROCEDURE — 93295 DEV INTERROG REMOTE 1/2/MLT: CPT | Performed by: STUDENT IN AN ORGANIZED HEALTH CARE EDUCATION/TRAINING PROGRAM

## 2023-07-13 PROCEDURE — 93296 REM INTERROG EVL PM/IDS: CPT | Performed by: STUDENT IN AN ORGANIZED HEALTH CARE EDUCATION/TRAINING PROGRAM

## 2023-07-19 PROCEDURE — G2066 INTER DEVC REMOTE 30D: HCPCS | Performed by: STUDENT IN AN ORGANIZED HEALTH CARE EDUCATION/TRAINING PROGRAM

## 2023-07-19 PROCEDURE — 93297 REM INTERROG DEV EVAL ICPMS: CPT | Performed by: STUDENT IN AN ORGANIZED HEALTH CARE EDUCATION/TRAINING PROGRAM

## 2023-09-18 DIAGNOSIS — I50.22 CHRONIC SYSTOLIC CONGESTIVE HEART FAILURE: Chronic | ICD-10-CM

## 2023-09-18 RX ORDER — BUMETANIDE 1 MG/1
TABLET ORAL
Qty: 180 TABLET | Refills: 3 | Status: SHIPPED | OUTPATIENT
Start: 2023-09-18

## 2023-09-18 NOTE — TELEPHONE ENCOUNTER
Lab Results   Component Value Date    GLUCOSE 101 (H) 03/27/2023    BUN 24 (H) 03/27/2023    CREATININE 1.14 03/27/2023    EGFR 64.6 03/27/2023    BCR 21.1 03/27/2023    K 4.6 03/27/2023    CO2 28.0 03/27/2023    CALCIUM 8.8 03/27/2023    ALBUMIN 3.55 12/16/2022    BILITOT 0.4 12/16/2022    AST 12 12/16/2022    ALT <5 12/16/2022

## 2023-09-25 ENCOUNTER — HOSPITAL ENCOUNTER (EMERGENCY)
Facility: HOSPITAL | Age: 83
Discharge: HOME | End: 2023-09-25
Attending: STUDENT IN AN ORGANIZED HEALTH CARE EDUCATION/TRAINING PROGRAM | Admitting: STUDENT IN AN ORGANIZED HEALTH CARE EDUCATION/TRAINING PROGRAM
Payer: MEDICARE

## 2023-09-25 ENCOUNTER — APPOINTMENT (OUTPATIENT)
Dept: GENERAL RADIOLOGY | Facility: HOSPITAL | Age: 83
End: 2023-09-25
Payer: MEDICARE

## 2023-09-25 VITALS
RESPIRATION RATE: 17 BRPM | OXYGEN SATURATION: 98 % | TEMPERATURE: 98.1 F | HEIGHT: 70 IN | BODY MASS INDEX: 27.77 KG/M2 | DIASTOLIC BLOOD PRESSURE: 71 MMHG | HEART RATE: 75 BPM | WEIGHT: 194 LBS | SYSTOLIC BLOOD PRESSURE: 131 MMHG

## 2023-09-25 DIAGNOSIS — U07.1 COVID-19: Primary | ICD-10-CM

## 2023-09-25 LAB
A-A DO2: 28.1 MMHG (ref 0–300)
ALBUMIN SERPL-MCNC: 2.9 G/DL (ref 3.5–5.2)
ALBUMIN/GLOB SERPL: 1.3 G/DL
ALP SERPL-CCNC: 66 U/L (ref 39–117)
ALT SERPL W P-5'-P-CCNC: 16 U/L (ref 1–41)
ANION GAP SERPL CALCULATED.3IONS-SCNC: 6.2 MMOL/L (ref 5–15)
APTT PPP: 56.5 SECONDS (ref 26.5–34.5)
ARTERIAL PATENCY WRIST A: ABNORMAL
AST SERPL-CCNC: 31 U/L (ref 1–40)
ATMOSPHERIC PRESS: 729 MMHG
BASE EXCESS BLDA CALC-SCNC: 3.5 MMOL/L (ref 0–2)
BASOPHILS # BLD AUTO: 0.01 10*3/MM3 (ref 0–0.2)
BASOPHILS NFR BLD AUTO: 0.4 % (ref 0–1.5)
BDY SITE: ABNORMAL
BILIRUB SERPL-MCNC: 0.4 MG/DL (ref 0–1.2)
BUN SERPL-MCNC: 27 MG/DL (ref 8–23)
BUN/CREAT SERPL: 21.1 (ref 7–25)
CALCIUM SPEC-SCNC: 7.5 MG/DL (ref 8.6–10.5)
CHLORIDE SERPL-SCNC: 104 MMOL/L (ref 98–107)
CO2 BLDA-SCNC: 29.8 MMOL/L (ref 22–33)
CO2 SERPL-SCNC: 27.8 MMOL/L (ref 22–29)
COHGB MFR BLD: 1.5 % (ref 0–5)
CREAT SERPL-MCNC: 1.28 MG/DL (ref 0.76–1.27)
CRP SERPL-MCNC: 0.65 MG/DL (ref 0–0.5)
D-LACTATE SERPL-SCNC: 1.1 MMOL/L (ref 0.5–2)
DEPRECATED RDW RBC AUTO: 54.2 FL (ref 37–54)
EGFRCR SERPLBLD CKD-EPI 2021: 55.9 ML/MIN/1.73
EOSINOPHIL # BLD AUTO: 0.03 10*3/MM3 (ref 0–0.4)
EOSINOPHIL NFR BLD AUTO: 1.1 % (ref 0.3–6.2)
ERYTHROCYTE [DISTWIDTH] IN BLOOD BY AUTOMATED COUNT: 17.9 % (ref 12.3–15.4)
FLUAV RNA RESP QL NAA+PROBE: NOT DETECTED
FLUBV RNA RESP QL NAA+PROBE: NOT DETECTED
GLOBULIN UR ELPH-MCNC: 2.3 GM/DL
GLUCOSE SERPL-MCNC: 87 MG/DL (ref 65–99)
HCO3 BLDA-SCNC: 28.4 MMOL/L (ref 20–26)
HCT VFR BLD AUTO: 38.2 % (ref 37.5–51)
HCT VFR BLD CALC: 36.4 % (ref 38–51)
HGB BLD-MCNC: 11.8 G/DL (ref 13–17.7)
HGB BLDA-MCNC: 11.9 G/DL (ref 14–18)
IMM GRANULOCYTES # BLD AUTO: 0 10*3/MM3 (ref 0–0.05)
IMM GRANULOCYTES NFR BLD AUTO: 0 % (ref 0–0.5)
INHALED O2 CONCENTRATION: 21 %
INR PPP: 2.69 (ref 0.9–1.1)
LDH SERPL-CCNC: 310 U/L (ref 135–225)
LYMPHOCYTES # BLD AUTO: 0.6 10*3/MM3 (ref 0.7–3.1)
LYMPHOCYTES NFR BLD AUTO: 21.1 % (ref 19.6–45.3)
Lab: ABNORMAL
MCH RBC QN AUTO: 25.9 PG (ref 26.6–33)
MCHC RBC AUTO-ENTMCNC: 30.9 G/DL (ref 31.5–35.7)
MCV RBC AUTO: 84 FL (ref 79–97)
METHGB BLD QL: <-0.1 % (ref 0–3)
MODALITY: ABNORMAL
MONOCYTES # BLD AUTO: 0.3 10*3/MM3 (ref 0.1–0.9)
MONOCYTES NFR BLD AUTO: 10.5 % (ref 5–12)
NEUTROPHILS NFR BLD AUTO: 1.91 10*3/MM3 (ref 1.7–7)
NEUTROPHILS NFR BLD AUTO: 66.9 % (ref 42.7–76)
NRBC BLD AUTO-RTO: 0 /100 WBC (ref 0–0.2)
OXYHGB MFR BLDV: 93.3 % (ref 94–99)
PCO2 BLDA: 43.4 MM HG (ref 35–45)
PCO2 TEMP ADJ BLD: ABNORMAL MM[HG]
PH BLDA: 7.42 PH UNITS (ref 7.35–7.45)
PH, TEMP CORRECTED: ABNORMAL
PLATELET # BLD AUTO: 130 10*3/MM3 (ref 140–450)
PMV BLD AUTO: 12.4 FL (ref 6–12)
PO2 BLDA: 66 MM HG (ref 83–108)
PO2 TEMP ADJ BLD: ABNORMAL MM[HG]
POTASSIUM SERPL-SCNC: 3.6 MMOL/L (ref 3.5–5.2)
PROCALCITONIN SERPL-MCNC: 0.1 NG/ML (ref 0–0.25)
PROT SERPL-MCNC: 5.2 G/DL (ref 6–8.5)
PROTHROMBIN TIME: 29.3 SECONDS (ref 12.1–14.7)
RBC # BLD AUTO: 4.55 10*6/MM3 (ref 4.14–5.8)
SAO2 % BLDCOA: 94.1 % (ref 94–99)
SARS-COV-2 RNA RESP QL NAA+PROBE: DETECTED
SODIUM SERPL-SCNC: 138 MMOL/L (ref 136–145)
VENTILATOR MODE: ABNORMAL
WBC NRBC COR # BLD: 2.85 10*3/MM3 (ref 3.4–10.8)

## 2023-09-25 PROCEDURE — 94799 UNLISTED PULMONARY SVC/PX: CPT

## 2023-09-25 PROCEDURE — 85025 COMPLETE CBC W/AUTO DIFF WBC: CPT | Performed by: PHYSICIAN ASSISTANT

## 2023-09-25 PROCEDURE — 84145 PROCALCITONIN (PCT): CPT | Performed by: PHYSICIAN ASSISTANT

## 2023-09-25 PROCEDURE — 87636 SARSCOV2 & INF A&B AMP PRB: CPT | Performed by: PHYSICIAN ASSISTANT

## 2023-09-25 PROCEDURE — 82805 BLOOD GASES W/O2 SATURATION: CPT

## 2023-09-25 PROCEDURE — 83050 HGB METHEMOGLOBIN QUAN: CPT

## 2023-09-25 PROCEDURE — 85730 THROMBOPLASTIN TIME PARTIAL: CPT | Performed by: PHYSICIAN ASSISTANT

## 2023-09-25 PROCEDURE — 80053 COMPREHEN METABOLIC PANEL: CPT | Performed by: PHYSICIAN ASSISTANT

## 2023-09-25 PROCEDURE — 36415 COLL VENOUS BLD VENIPUNCTURE: CPT

## 2023-09-25 PROCEDURE — 85610 PROTHROMBIN TIME: CPT | Performed by: PHYSICIAN ASSISTANT

## 2023-09-25 PROCEDURE — 83605 ASSAY OF LACTIC ACID: CPT | Performed by: PHYSICIAN ASSISTANT

## 2023-09-25 PROCEDURE — 36600 WITHDRAWAL OF ARTERIAL BLOOD: CPT

## 2023-09-25 PROCEDURE — 96361 HYDRATE IV INFUSION ADD-ON: CPT

## 2023-09-25 PROCEDURE — 71045 X-RAY EXAM CHEST 1 VIEW: CPT

## 2023-09-25 PROCEDURE — 83615 LACTATE (LD) (LDH) ENZYME: CPT | Performed by: PHYSICIAN ASSISTANT

## 2023-09-25 PROCEDURE — 82375 ASSAY CARBOXYHB QUANT: CPT

## 2023-09-25 PROCEDURE — 99283 EMERGENCY DEPT VISIT LOW MDM: CPT

## 2023-09-25 PROCEDURE — 86140 C-REACTIVE PROTEIN: CPT | Performed by: PHYSICIAN ASSISTANT

## 2023-09-25 PROCEDURE — 71045 X-RAY EXAM CHEST 1 VIEW: CPT | Performed by: RADIOLOGY

## 2023-09-25 PROCEDURE — 96374 THER/PROPH/DIAG INJ IV PUSH: CPT

## 2023-09-25 PROCEDURE — 25010000002 METHYLPREDNISOLONE PER 125 MG: Performed by: PHYSICIAN ASSISTANT

## 2023-09-25 PROCEDURE — 94640 AIRWAY INHALATION TREATMENT: CPT

## 2023-09-25 RX ORDER — METHYLPREDNISOLONE SODIUM SUCCINATE 125 MG/2ML
80 INJECTION, POWDER, LYOPHILIZED, FOR SOLUTION INTRAMUSCULAR; INTRAVENOUS ONCE
Status: COMPLETED | OUTPATIENT
Start: 2023-09-25 | End: 2023-09-25

## 2023-09-25 RX ORDER — IPRATROPIUM BROMIDE AND ALBUTEROL SULFATE 2.5; .5 MG/3ML; MG/3ML
3 SOLUTION RESPIRATORY (INHALATION) ONCE
Status: COMPLETED | OUTPATIENT
Start: 2023-09-25 | End: 2023-09-25

## 2023-09-25 RX ADMIN — IPRATROPIUM BROMIDE AND ALBUTEROL SULFATE 3 ML: .5; 2.5 SOLUTION RESPIRATORY (INHALATION) at 13:23

## 2023-09-25 RX ADMIN — SODIUM CHLORIDE 500 ML: 9 INJECTION, SOLUTION INTRAVENOUS at 12:19

## 2023-09-25 RX ADMIN — METHYLPREDNISOLONE SODIUM SUCCINATE 80 MG: 125 INJECTION, POWDER, FOR SOLUTION INTRAMUSCULAR; INTRAVENOUS at 12:20
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
## 2023-11-06 ENCOUNTER — TELEPHONE (OUTPATIENT)
Dept: CARDIOLOGY | Facility: CLINIC | Age: 83
End: 2023-11-06
Payer: MEDICARE

## 2023-11-06 NOTE — TELEPHONE ENCOUNTER
Remote monitor reading received showing elevated heart logic elevated.  Called to check in with  [employer].  No answer and no voicemail.

## 2024-02-22 ENCOUNTER — TELEPHONE (OUTPATIENT)
Dept: CARDIOLOGY | Facility: CLINIC | Age: 84
End: 2024-02-22
Payer: MEDICARE

## 2024-02-22 NOTE — TELEPHONE ENCOUNTER
"Per [email or web address] [facility] remote cardiac device transmission received today, 2/22/2024:     HeartLogic Index elevated @ 21   Thoracic impedance 29.5 ohms which is pt's baseline trend.   Pt hx of permanent Afib  BIV pacing 96% since 7/7/2023  Zero PVC count since 7/7/2023    Next f/u appointment w/Dr. Brothers 4/23/2024.     Pt reports some swelling to feet/ankles & slight increase in shortness of breath. Pt also states he weighs himself several times a week & his wt has increased over the last several days from 197 to 200 pounds. Reviewed medications w/pt who reports he is only taking bumetanide once a day instead of twice a day as prescribed. Pt reports that when he takes it twice a day along with the Inspra he cannot \"...get anything done\" because of urinary frequency. Advised pt to take bumetanide twice a day for the next three days. Also educated pt about sodium intake & advised him to monitor & limit his sodium intake. Pt verbalized understanding & compliance.                 "

## 2024-02-22 NOTE — TELEPHONE ENCOUNTER
Perfect!    MARCIANO Brothers MD [facility], [facility]  Interventional and General Cardiology

## 2024-04-02 ENCOUNTER — TELEPHONE (OUTPATIENT)
Dept: CARDIOLOGY | Facility: CLINIC | Age: 84
End: 2024-04-02
Payer: MEDICARE

## 2024-04-02 NOTE — TELEPHONE ENCOUNTER
Per [employer] [facility] remote cardiac device transmission received yesterday, 4/1/2024:     HeartLogic Index 28, thoracic impedance at pt's baseline.     Pt reports compliance w/bumetanide twice a day, reports his weight today is 188 lbs-states he feels better when his weight is 190 or below. Denies increased shortness of breath or lower extremity edema.

## 2024-04-19 NOTE — PROGRESS NOTES
"    Cardiology Outpatient Visit      Identification: Ezequiel Thompson is a 83 y.o. male who resides in [place], [place].     Reason for visit:  Mechanical mitral valve  HFrEF  CAD  Permanent atrial fibrillation  CRT ICD in situ      Kim Smith returns to the office today for routine follow-up.  He is doing well with no new cardiac symptoms.  He stopped several medicines since his last visit including low-dose aspirin, rosuvastatin, and eplerenone.  The patient asked whether we have samples of empagliflozin and Entresto.    Review of Systems   Cardiovascular: Negative.    Respiratory: Negative.         Allergies   Allergen Reactions    Spironolactone Other (See Comments)     gynocomastia    Iodinated Contrast Media Hives    Iodine Hives    Levothyroxine Dizziness         Current Outpatient Medications   Medication Instructions    allopurinol (ZYLOPRIM) 300 mg, Oral, Daily    aspirin 81 mg, Oral, Daily    bumetanide (BUMEX) 1 MG tablet TAKE 1 TABLET TWICE A DAY    carvedilol (COREG) 12.5 mg, Oral, 2 Times Daily With Meals    empagliflozin (JARDIANCE) 10 mg, Oral, Daily    Ergocalciferol (VITAMIN D2 PO) 50,000 Units, Weekly    potassium chloride (MICRO-K) 10 MEQ CR capsule 20 mEq, Oral, 3 Times Daily, 9 tablets daily    rosuvastatin (CRESTOR) 10 mg, Oral, Daily    sacubitril-valsartan (Entresto) 24-26 MG tablet 1 tablet, Oral, 2 Times Daily    warfarin (COUMADIN) 4 mg, Oral, Daily Warfarin         Objective     /76 (BP Location: Left arm, Patient Position: Sitting)   Pulse 76   Ht 177.8 cm (70\")   Wt 87.5 kg (193 lb)   SpO2 99%   BMI 27.69 kg/m²       Constitutional:       Appearance: Healthy appearance.   Eyes:      General: No scleral icterus.  Neck:      Thyroid: No thyroid mass.      Vascular: No carotid bruit or JVD. JVD normal.   Pulmonary:      Effort: Pulmonary effort is normal.      Breath sounds: Normal breath sounds.   Cardiovascular:      Normal rate. Regular rhythm.      Murmurs: " There is no murmur.      No gallop.    Edema:     Peripheral edema absent.   Skin:     General: Skin is warm. There is no cyanosis.   Neurological:      General: No focal deficit present.      Mental Status: Alert.   Psychiatric:         Attention and Perception: Attention normal.         Result Review  (reviewed with patient):        EKG findings below are incorrect.  No EKG was performed.     [employer] CRT ICD interrogated.  Biventricular pacing 97%.  Accelerometer increased to 10.  Please see device interrogation sheet for details.    Lab Results   Component Value Date    GLUCOSE 87 09/25/2023    BUN 27 (H) 09/25/2023    CREATININE 1.28 (H) 09/25/2023    EGFR 55.9 (L) 09/25/2023    BCR 21.1 09/25/2023    K 3.6 09/25/2023    CO2 27.8 09/25/2023    CALCIUM 7.5 (L) 09/25/2023    ALBUMIN 2.9 (L) 09/25/2023    BILITOT 0.4 09/25/2023    AST 31 09/25/2023    ALT 16 09/25/2023     Lab Results   Component Value Date    WBC 2.85 (L) 09/25/2023    HGB 11.8 (L) 09/25/2023    HCT 38.2 09/25/2023    MCV 84.0 09/25/2023     (L) 09/25/2023           Assessment     Diagnoses and all orders for this visit:    1. Chronic systolic congestive heart failure (Primary)  Overview:  Echocardiogram (7/10/2020): Severe LV dysfunction.  Normal functioning mechanical mitral valve.  Moderate to severe TR, mild pulmonary hypertension with RVSP 45 mmHg  Echocardiogram (3/23/2021): LVEF 26-30% with dilated LV.  Mechanical mitral valve with mild MR.  RVSP 45-55 mmHg  Intolerant to spironolactone    Assessment & Plan:  Stage C HFrEF with stable NYHA class II symptoms  Continue carvedilol, Entresto, and empagliflozin    Orders:  -     sacubitril-valsartan (Entresto) 24-26 MG tablet; Take 1 tablet by mouth 2 (Two) Times a Day.  Dispense: 180 tablet; Refill: 3  -     empagliflozin (Jardiance) 10 MG tablet tablet; Take 1 tablet by mouth Daily.  Dispense: 90 tablet; Refill: 3  -     carvedilol (COREG) 12.5 MG tablet; Take 1 tablet by  mouth 2 (Two) Times a Day With Meals.  Dispense: 180 tablet; Refill: 3  -     Cancel: ECG 12 Lead    2. Mechanical mitral valve in situ  Overview:  Previous mechanical MVR  Echocardiogram (7/10/2020): Severe LV dysfunction.  Normal functioning mechanical mitral valve.  Moderate to severe TR, mild pulmonary hypertension with RVSP 45 mmHg  Echocardiogram (3/23/2021): LVEF 26-30% with dilated LV.  Mechanical mitral valve with mild MR.  RVSP 45-55 mmHg    Assessment & Plan:  Continue warfarin with goal INR 2.5-3  Resume aspirin 81 mg daily  Repeat echo in 1 year    Orders:  -     Adult Transthoracic Echo Complete W/ Cont if Necessary Per Protocol; Future    3. Coronary artery disease involving native coronary artery of native heart with angina pectoris  Overview:  [facility] in [place], [place]    Assessment & Plan:  No angina  Resume aspirin 81 mg daily  Resume rosuvastatin     Orders:  -     carvedilol (COREG) 12.5 MG tablet; Take 1 tablet by mouth 2 (Two) Times a Day With Meals.  Dispense: 180 tablet; Refill: 3  -     aspirin 81 MG EC tablet; Take 1 tablet by mouth Daily.  Dispense: 90 tablet; Refill: 3    4. Permanent atrial fibrillation  Overview:  [id number] 6 (age >75, CHF, CAD, HTN, CVA)    Assessment & Plan:  Asymptomatic  Continue warfarin for mechanical mitral valve and A-fib    Orders:  -     carvedilol (COREG) 12.5 MG tablet; Take 1 tablet by mouth 2 (Two) Times a Day With Meals.  Dispense: 180 tablet; Refill: 3    5. Hyperlipidemia LDL goal <70  Overview:  High intensity statin therapy indicated given the presence of CAD    Assessment & Plan:  Statin therapy indicated the presence of CAD  Resume rosuvastatin    Orders:  -     rosuvastatin (CRESTOR) 10 MG tablet; Take 1 tablet by mouth Daily.  Dispense: 90 tablet; Refill: 3    6. Cardiac resynchronization therapy defibrillator (CRT-D) in place  Overview:  [employer] ICD for primary prevention            Plan   Resume aspirin 81 mg daily  Continue warfarin with  goal INR 2.5-3  Resume rosuvastatin  Surveillance echo to evaluate mechanical mitral valve in 1 year      Follow-up   Return in about 1 year (around 4/23/2025) for Device Check.        Buddy Brothers MD, [facility], [facility]  4/23/2024

## 2024-04-23 ENCOUNTER — OFFICE VISIT (OUTPATIENT)
Dept: CARDIOLOGY | Facility: CLINIC | Age: 84
End: 2024-04-23
Payer: MEDICARE

## 2024-04-23 VITALS
HEART RATE: 76 BPM | WEIGHT: 193 LBS | DIASTOLIC BLOOD PRESSURE: 76 MMHG | SYSTOLIC BLOOD PRESSURE: 120 MMHG | OXYGEN SATURATION: 99 % | BODY MASS INDEX: 27.63 KG/M2 | HEIGHT: 70 IN

## 2024-04-23 DIAGNOSIS — E78.5 HYPERLIPIDEMIA LDL GOAL <70: ICD-10-CM

## 2024-04-23 DIAGNOSIS — Z95.810 CARDIAC RESYNCHRONIZATION THERAPY DEFIBRILLATOR (CRT-D) IN PLACE: ICD-10-CM

## 2024-04-23 DIAGNOSIS — Z95.2 H/O MITRAL VALVE REPLACEMENT WITH MECHANICAL VALVE: Chronic | ICD-10-CM

## 2024-04-23 DIAGNOSIS — I25.119 CORONARY ARTERY DISEASE INVOLVING NATIVE CORONARY ARTERY OF NATIVE HEART WITH ANGINA PECTORIS: Chronic | ICD-10-CM

## 2024-04-23 DIAGNOSIS — I50.22 CHRONIC SYSTOLIC CONGESTIVE HEART FAILURE: Primary | Chronic | ICD-10-CM

## 2024-04-23 DIAGNOSIS — I48.21 PERMANENT ATRIAL FIBRILLATION: Chronic | ICD-10-CM

## 2024-04-23 PROCEDURE — 93284 PRGRMG EVAL IMPLANTABLE DFB: CPT | Performed by: INTERNAL MEDICINE

## 2024-04-23 PROCEDURE — 3074F SYST BP LT 130 MM HG: CPT | Performed by: INTERNAL MEDICINE

## 2024-04-23 PROCEDURE — 99214 OFFICE O/P EST MOD 30 MIN: CPT | Performed by: INTERNAL MEDICINE

## 2024-04-23 PROCEDURE — 1160F RVW MEDS BY RX/DR IN RCRD: CPT | Performed by: INTERNAL MEDICINE

## 2024-04-23 PROCEDURE — 3078F DIAST BP <80 MM HG: CPT | Performed by: INTERNAL MEDICINE

## 2024-04-23 PROCEDURE — 1159F MED LIST DOCD IN RCRD: CPT | Performed by: INTERNAL MEDICINE

## 2024-04-23 RX ORDER — ROSUVASTATIN CALCIUM 10 MG/1
10 TABLET, COATED ORAL DAILY
Qty: 90 TABLET | Refills: 3 | Status: SHIPPED | OUTPATIENT
Start: 2024-04-23

## 2024-04-23 RX ORDER — ASPIRIN 81 MG/1
81 TABLET ORAL DAILY
Qty: 90 TABLET | Refills: 3 | Status: SHIPPED | OUTPATIENT
Start: 2024-04-23

## 2024-04-23 RX ORDER — SACUBITRIL AND VALSARTAN 24; 26 MG/1; MG/1
1 TABLET, FILM COATED ORAL 2 TIMES DAILY
Qty: 180 TABLET | Refills: 3 | Status: SHIPPED | OUTPATIENT
Start: 2024-04-23

## 2024-04-23 RX ORDER — CARVEDILOL 12.5 MG/1
12.5 TABLET ORAL 2 TIMES DAILY WITH MEALS
Qty: 180 TABLET | Refills: 3 | Status: SHIPPED | OUTPATIENT
Start: 2024-04-23

## 2024-04-23 NOTE — ASSESSMENT & PLAN NOTE
Stage C HFrEF with stable NYHA class II symptoms  Continue carvedilol, Entresto, and empagliflozin

## 2024-06-04 ENCOUNTER — TRANSCRIBE ORDERS (OUTPATIENT)
Dept: ADMINISTRATIVE | Facility: HOSPITAL | Age: 84
End: 2024-06-04
Payer: MEDICARE

## 2024-06-04 ENCOUNTER — HOSPITAL ENCOUNTER (OUTPATIENT)
Dept: GENERAL RADIOLOGY | Facility: HOSPITAL | Age: 84
Discharge: HOME | End: 2024-06-04
Payer: MEDICARE

## 2024-06-04 ENCOUNTER — LAB (OUTPATIENT)
Dept: LAB | Facility: HOSPITAL | Age: 84
End: 2024-06-04
Payer: MEDICARE

## 2024-06-04 DIAGNOSIS — U09.9 POST-COVID-19 SYNDROME MANIFESTING AS CHRONIC COUGH: ICD-10-CM

## 2024-06-04 DIAGNOSIS — R05.3 CHRONIC COUGH: ICD-10-CM

## 2024-06-04 DIAGNOSIS — R68.89 DECREASED ACTIVITY: ICD-10-CM

## 2024-06-04 DIAGNOSIS — R05.3 POST-COVID-19 SYNDROME MANIFESTING AS CHRONIC COUGH: ICD-10-CM

## 2024-06-04 DIAGNOSIS — R05.3 CHRONIC COUGH: Primary | ICD-10-CM

## 2024-06-04 LAB
B PARAPERT DNA SPEC QL NAA+PROBE: NOT DETECTED
B PERT DNA SPEC QL NAA+PROBE: NOT DETECTED
C PNEUM DNA NPH QL NAA+NON-PROBE: NOT DETECTED
FLUAV SUBTYP SPEC NAA+PROBE: NOT DETECTED
FLUBV RNA ISLT QL NAA+PROBE: NOT DETECTED
HADV DNA SPEC NAA+PROBE: NOT DETECTED
HCOV 229E RNA SPEC QL NAA+PROBE: NOT DETECTED
HCOV HKU1 RNA SPEC QL NAA+PROBE: NOT DETECTED
HCOV NL63 RNA SPEC QL NAA+PROBE: NOT DETECTED
HCOV OC43 RNA SPEC QL NAA+PROBE: NOT DETECTED
HMPV RNA NPH QL NAA+NON-PROBE: NOT DETECTED
HPIV1 RNA ISLT QL NAA+PROBE: NOT DETECTED
HPIV2 RNA SPEC QL NAA+PROBE: NOT DETECTED
HPIV3 RNA NPH QL NAA+PROBE: NOT DETECTED
HPIV4 P GENE NPH QL NAA+PROBE: NOT DETECTED
M PNEUMO IGG SER IA-ACNC: NOT DETECTED
RHINOVIRUS RNA SPEC NAA+PROBE: DETECTED
RSV RNA NPH QL NAA+NON-PROBE: NOT DETECTED
SARS-COV-2 RNA NPH QL NAA+NON-PROBE: NOT DETECTED

## 2024-06-04 PROCEDURE — 0202U NFCT DS 22 TRGT SARS-COV-2: CPT

## 2024-06-04 PROCEDURE — 71046 X-RAY EXAM CHEST 2 VIEWS: CPT

## 2024-06-04 PROCEDURE — 71046 X-RAY EXAM CHEST 2 VIEWS: CPT | Performed by: RADIOLOGY

## 2024-08-16 ENCOUNTER — TRANSCRIBE ORDERS (OUTPATIENT)
Dept: ADMINISTRATIVE | Facility: HOSPITAL | Age: 84
End: 2024-08-16
Payer: MEDICARE

## 2024-08-16 DIAGNOSIS — R09.89 WEAK PULSE: Primary | ICD-10-CM

## 2024-09-04 ENCOUNTER — HOSPITAL ENCOUNTER (OUTPATIENT)
Dept: ULTRASOUND IMAGING | Facility: HOSPITAL | Age: 84
Discharge: HOME | End: 2024-09-04
Admitting: INTERNAL MEDICINE
Payer: MEDICARE

## 2024-09-04 DIAGNOSIS — R09.89 WEAK PULSE: ICD-10-CM

## 2024-09-04 PROCEDURE — 93880 EXTRACRANIAL BILAT STUDY: CPT

## 2024-09-06 DIAGNOSIS — I50.22 CHRONIC SYSTOLIC CONGESTIVE HEART FAILURE: Chronic | ICD-10-CM

## 2024-09-06 RX ORDER — BUMETANIDE 1 MG/1
TABLET ORAL
Qty: 180 TABLET | Refills: 3 | Status: SHIPPED | OUTPATIENT
Start: 2024-09-06

## 2024-09-23 ENCOUNTER — TRANSCRIBE ORDERS (OUTPATIENT)
Dept: ADMINISTRATIVE | Facility: HOSPITAL | Age: 84
End: 2024-09-23
Payer: MEDICARE

## 2024-09-23 ENCOUNTER — HOSPITAL ENCOUNTER (OUTPATIENT)
Dept: GENERAL RADIOLOGY | Facility: HOSPITAL | Age: 84
Discharge: HOME | End: 2024-09-23
Admitting: INTERNAL MEDICINE
Payer: MEDICARE

## 2024-09-23 DIAGNOSIS — R06.2 WHEEZING: ICD-10-CM

## 2024-09-23 DIAGNOSIS — R06.2 WHEEZING: Primary | ICD-10-CM

## 2024-09-23 PROCEDURE — 71046 X-RAY EXAM CHEST 2 VIEWS: CPT

## 2024-09-23 PROCEDURE — 71046 X-RAY EXAM CHEST 2 VIEWS: CPT | Performed by: RADIOLOGY

## 2024-12-16 ENCOUNTER — HOSPITAL ENCOUNTER (EMERGENCY)
Facility: HOSPITAL | Age: 84
Discharge: HOME | End: 2024-12-16
Attending: EMERGENCY MEDICINE | Admitting: EMERGENCY MEDICINE
Payer: MEDICARE

## 2024-12-16 ENCOUNTER — APPOINTMENT (OUTPATIENT)
Dept: GENERAL RADIOLOGY | Facility: HOSPITAL | Age: 84
End: 2024-12-16
Payer: MEDICARE

## 2024-12-16 VITALS
SYSTOLIC BLOOD PRESSURE: 113 MMHG | BODY MASS INDEX: 29.92 KG/M2 | HEART RATE: 80 BPM | OXYGEN SATURATION: 99 % | WEIGHT: 209 LBS | TEMPERATURE: 98.4 F | RESPIRATION RATE: 16 BRPM | DIASTOLIC BLOOD PRESSURE: 64 MMHG | HEIGHT: 70 IN

## 2024-12-16 DIAGNOSIS — J18.9 COMMUNITY ACQUIRED PNEUMONIA OF RIGHT LOWER LOBE OF LUNG: Primary | ICD-10-CM

## 2024-12-16 DIAGNOSIS — I50.9 CHRONIC CONGESTIVE HEART FAILURE, UNSPECIFIED HEART FAILURE TYPE: ICD-10-CM

## 2024-12-16 LAB
A-A DO2: 25.3 MMHG (ref 0–300)
ALBUMIN SERPL-MCNC: 3.1 G/DL (ref 3.5–5.2)
ALBUMIN/GLOB SERPL: 1.6 G/DL
ALP SERPL-CCNC: 77 U/L (ref 39–117)
ALT SERPL W P-5'-P-CCNC: 9 U/L (ref 1–41)
ANION GAP SERPL CALCULATED.3IONS-SCNC: 7.1 MMOL/L (ref 5–15)
ARTERIAL PATENCY WRIST A: ABNORMAL
AST SERPL-CCNC: 17 U/L (ref 1–40)
ATMOSPHERIC PRESS: 729 MMHG
BASE EXCESS BLDA CALC-SCNC: 4.4 MMOL/L (ref 0–2)
BASOPHILS # BLD AUTO: 0.05 10*3/MM3 (ref 0–0.2)
BASOPHILS NFR BLD AUTO: 0.9 % (ref 0–1.5)
BDY SITE: ABNORMAL
BILIRUB SERPL-MCNC: 0.8 MG/DL (ref 0–1.2)
BUN SERPL-MCNC: 31 MG/DL (ref 8–23)
BUN/CREAT SERPL: 24.4 (ref 7–25)
CALCIUM SPEC-SCNC: 8 MG/DL (ref 8.6–10.5)
CHLORIDE SERPL-SCNC: 103 MMOL/L (ref 98–107)
CO2 BLDA-SCNC: 29.5 MMOL/L (ref 22–33)
CO2 SERPL-SCNC: 27.9 MMOL/L (ref 22–29)
COHGB MFR BLD: 1.6 % (ref 0–5)
CREAT SERPL-MCNC: 1.27 MG/DL (ref 0.76–1.27)
D-LACTATE SERPL-SCNC: 1.2 MMOL/L (ref 0.5–2)
DEPRECATED RDW RBC AUTO: 53.8 FL (ref 37–54)
EGFRCR SERPLBLD CKD-EPI 2021: 56.1 ML/MIN/1.73
EOSINOPHIL # BLD AUTO: 0.2 10*3/MM3 (ref 0–0.4)
EOSINOPHIL NFR BLD AUTO: 3.5 % (ref 0.3–6.2)
ERYTHROCYTE [DISTWIDTH] IN BLOOD BY AUTOMATED COUNT: 15.8 % (ref 12.3–15.4)
GEN 5 1HR TROPONIN T REFLEX: 49 NG/L
GLOBULIN UR ELPH-MCNC: 1.9 GM/DL
GLUCOSE SERPL-MCNC: 121 MG/DL (ref 65–99)
HCO3 BLDA-SCNC: 28.3 MMOL/L (ref 20–26)
HCT VFR BLD AUTO: 42.4 % (ref 37.5–51)
HCT VFR BLD CALC: 38.2 % (ref 38–51)
HGB BLD-MCNC: 13.1 G/DL (ref 13–17.7)
HGB BLDA-MCNC: 12.5 G/DL (ref 14–18)
HOLD SPECIMEN: NORMAL
HOLD SPECIMEN: NORMAL
IMM GRANULOCYTES # BLD AUTO: 0.02 10*3/MM3 (ref 0–0.05)
IMM GRANULOCYTES NFR BLD AUTO: 0.3 % (ref 0–0.5)
INHALED O2 CONCENTRATION: 21 %
INR PPP: 2.69 (ref 0.9–1.1)
LYMPHOCYTES # BLD AUTO: 0.84 10*3/MM3 (ref 0.7–3.1)
LYMPHOCYTES NFR BLD AUTO: 14.5 % (ref 19.6–45.3)
Lab: ABNORMAL
MCH RBC QN AUTO: 28.7 PG (ref 26.6–33)
MCHC RBC AUTO-ENTMCNC: 30.9 G/DL (ref 31.5–35.7)
MCV RBC AUTO: 93 FL (ref 79–97)
METHGB BLD QL: <-0.1 % (ref 0–3)
MODALITY: ABNORMAL
MONOCYTES # BLD AUTO: 0.5 10*3/MM3 (ref 0.1–0.9)
MONOCYTES NFR BLD AUTO: 8.7 % (ref 5–12)
NEUTROPHILS NFR BLD AUTO: 4.17 10*3/MM3 (ref 1.7–7)
NEUTROPHILS NFR BLD AUTO: 72.1 % (ref 42.7–76)
NRBC BLD AUTO-RTO: 0 /100 WBC (ref 0–0.2)
NT-PROBNP SERPL-MCNC: 829.7 PG/ML (ref 0–1800)
OXYHGB MFR BLDV: 94.7 % (ref 94–99)
PCO2 BLDA: 38.9 MM HG (ref 35–45)
PCO2 TEMP ADJ BLD: ABNORMAL MM[HG]
PH BLDA: 7.47 PH UNITS (ref 7.35–7.45)
PH, TEMP CORRECTED: ABNORMAL
PLATELET # BLD AUTO: 129 10*3/MM3 (ref 140–450)
PMV BLD AUTO: 11.2 FL (ref 6–12)
PO2 BLDA: 74.1 MM HG (ref 83–108)
PO2 TEMP ADJ BLD: ABNORMAL MM[HG]
POTASSIUM SERPL-SCNC: 4.1 MMOL/L (ref 3.5–5.2)
PROT SERPL-MCNC: 5 G/DL (ref 6–8.5)
PROTHROMBIN TIME: 28.6 SECONDS (ref 12.1–14.7)
RBC # BLD AUTO: 4.56 10*6/MM3 (ref 4.14–5.8)
SAO2 % BLDCOA: 96 % (ref 94–99)
SODIUM SERPL-SCNC: 138 MMOL/L (ref 136–145)
TROPONIN T % DELTA: -4 %
TROPONIN T NUMERIC DELTA: -2 NG/L
TROPONIN T SERPL HS-MCNC: 51 NG/L
VENTILATOR MODE: ABNORMAL
WBC NRBC COR # BLD AUTO: 5.78 10*3/MM3 (ref 3.4–10.8)
WHOLE BLOOD HOLD COAG: NORMAL
WHOLE BLOOD HOLD SPECIMEN: NORMAL

## 2024-12-16 PROCEDURE — 99284 EMERGENCY DEPT VISIT MOD MDM: CPT

## 2024-12-16 PROCEDURE — 71045 X-RAY EXAM CHEST 1 VIEW: CPT

## 2024-12-16 PROCEDURE — 85610 PROTHROMBIN TIME: CPT | Performed by: EMERGENCY MEDICINE

## 2024-12-16 PROCEDURE — 82375 ASSAY CARBOXYHB QUANT: CPT

## 2024-12-16 PROCEDURE — 83050 HGB METHEMOGLOBIN QUAN: CPT

## 2024-12-16 PROCEDURE — 25810000003 SODIUM CHLORIDE 0.9 % SOLUTION: Performed by: EMERGENCY MEDICINE

## 2024-12-16 PROCEDURE — 80053 COMPREHEN METABOLIC PANEL: CPT | Performed by: EMERGENCY MEDICINE

## 2024-12-16 PROCEDURE — 71045 X-RAY EXAM CHEST 1 VIEW: CPT | Performed by: RADIOLOGY

## 2024-12-16 PROCEDURE — 85025 COMPLETE CBC W/AUTO DIFF WBC: CPT | Performed by: EMERGENCY MEDICINE

## 2024-12-16 PROCEDURE — 93005 ELECTROCARDIOGRAM TRACING: CPT | Performed by: EMERGENCY MEDICINE

## 2024-12-16 PROCEDURE — 84484 ASSAY OF TROPONIN QUANT: CPT | Performed by: EMERGENCY MEDICINE

## 2024-12-16 PROCEDURE — 83605 ASSAY OF LACTIC ACID: CPT | Performed by: EMERGENCY MEDICINE

## 2024-12-16 PROCEDURE — 87040 BLOOD CULTURE FOR BACTERIA: CPT | Performed by: EMERGENCY MEDICINE

## 2024-12-16 PROCEDURE — 82805 BLOOD GASES W/O2 SATURATION: CPT

## 2024-12-16 PROCEDURE — 83880 ASSAY OF NATRIURETIC PEPTIDE: CPT | Performed by: EMERGENCY MEDICINE

## 2024-12-16 PROCEDURE — 36415 COLL VENOUS BLD VENIPUNCTURE: CPT

## 2024-12-16 PROCEDURE — 36600 WITHDRAWAL OF ARTERIAL BLOOD: CPT

## 2024-12-16 RX ORDER — SODIUM CHLORIDE 0.9 % (FLUSH) 0.9 %
10 SYRINGE (ML) INJECTION AS NEEDED
Status: DISCONTINUED | OUTPATIENT
Start: 2024-12-16 | End: 2024-12-16 | Stop reason: HOSPADM

## 2024-12-16 RX ORDER — AZITHROMYCIN 250 MG/1
TABLET, FILM COATED ORAL
Qty: 6 TABLET | Refills: 0 | Status: SHIPPED | OUTPATIENT
Start: 2024-12-16

## 2024-12-16 RX ADMIN — SODIUM CHLORIDE 500 ML: 9 INJECTION, SOLUTION INTRAVENOUS at 14:24
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
## 2024-12-17 LAB
QT INTERVAL: 440 MS
QTC INTERVAL: 523 MS

## 2024-12-21 LAB
BACTERIA SPEC AEROBE CULT: NORMAL
BACTERIA SPEC AEROBE CULT: NORMAL

## 2025-01-23 ENCOUNTER — TELEPHONE (OUTPATIENT)
Dept: CARDIOLOGY | Facility: CLINIC | Age: 85
End: 2025-01-23
Payer: MEDICARE

## 2025-01-23 NOTE — TELEPHONE ENCOUNTER
Madelin at PCP office called to request a sooner appt but left no specific details. Requested records be sent. Will review once received. Just GENE Dwyer- waiting on records to schedule.

## 2025-01-29 ENCOUNTER — TELEPHONE (OUTPATIENT)
Dept: CARDIOLOGY | Facility: CLINIC | Age: 85
End: 2025-01-29
Payer: MEDICARE

## 2025-01-29 DIAGNOSIS — E78.5 HYPERLIPIDEMIA LDL GOAL <70: Chronic | ICD-10-CM

## 2025-01-29 DIAGNOSIS — I50.22 CHRONIC SYSTOLIC CONGESTIVE HEART FAILURE: Primary | Chronic | ICD-10-CM

## 2025-01-29 DIAGNOSIS — I25.119 CORONARY ARTERY DISEASE INVOLVING NATIVE CORONARY ARTERY OF NATIVE HEART WITH ANGINA PECTORIS: Chronic | ICD-10-CM

## 2025-01-29 NOTE — TELEPHONE ENCOUNTER
PCP office called to report that Dr. Mcgee would like the patient seen d/t complaints of shortness of breath and leg swelling. You ordered a repeat echo on his follow up 4/23/2025. Did you want his echo moved up?

## 2025-01-30 NOTE — TELEPHONE ENCOUNTER
Move up echo and have him get lab work.  Orders placed for lab work..  Get him into the office to see me or Ludmila sooner than later

## 2025-02-05 ENCOUNTER — HOSPITAL ENCOUNTER (OUTPATIENT)
Dept: CARDIOLOGY | Facility: HOSPITAL | Age: 85
Discharge: HOME | End: 2025-02-05
Admitting: INTERNAL MEDICINE
Payer: MEDICARE

## 2025-02-05 VITALS
SYSTOLIC BLOOD PRESSURE: 110 MMHG | HEIGHT: 70 IN | WEIGHT: 209 LBS | DIASTOLIC BLOOD PRESSURE: 71 MMHG | BODY MASS INDEX: 29.92 KG/M2

## 2025-02-05 DIAGNOSIS — Z95.2 H/O MITRAL VALVE REPLACEMENT WITH MECHANICAL VALVE: Chronic | ICD-10-CM

## 2025-02-05 PROCEDURE — 93306 TTE W/DOPPLER COMPLETE: CPT

## 2025-02-06 LAB
AV MEAN PRESS GRAD SYS DOP V1V2: 4 MMHG
AV VMAX SYS DOP: 141.8 CM/SEC
BH CV ECHO MEAS - AO MAX PG: 8 MMHG
BH CV ECHO MEAS - AO ROOT DIAM: 3.3 CM
BH CV ECHO MEAS - AO V2 VTI: 32 CM
BH CV ECHO MEAS - AVA(I,D): 1.71 CM2
BH CV ECHO MEAS - EDV(CUBED): 175.3 ML
BH CV ECHO MEAS - EDV(MOD-SP2): 148 ML
BH CV ECHO MEAS - EDV(MOD-SP4): 198 ML
BH CV ECHO MEAS - EF(MOD-SP2): 43.5 %
BH CV ECHO MEAS - EF(MOD-SP4): 42.4 %
BH CV ECHO MEAS - ESV(CUBED): 104.3 ML
BH CV ECHO MEAS - ESV(MOD-SP2): 83.6 ML
BH CV ECHO MEAS - ESV(MOD-SP4): 114 ML
BH CV ECHO MEAS - FS: 15.9 %
BH CV ECHO MEAS - IVS/LVPW: 0.86 CM
BH CV ECHO MEAS - IVSD: 0.9 CM
BH CV ECHO MEAS - LA DIMENSION: 7.5 CM
BH CV ECHO MEAS - LAT PEAK E' VEL: 7.3 CM/SEC
BH CV ECHO MEAS - LV DIASTOLIC VOL/BSA (35-75): 94.7 CM2
BH CV ECHO MEAS - LV MASS(C)D: 210.1 GRAMS
BH CV ECHO MEAS - LV MAX PG: 2.38 MMHG
BH CV ECHO MEAS - LV MEAN PG: 1.11 MMHG
BH CV ECHO MEAS - LV SYSTOLIC VOL/BSA (12-30): 54.5 CM2
BH CV ECHO MEAS - LV V1 MAX: 77.2 CM/SEC
BH CV ECHO MEAS - LV V1 VTI: 16.8 CM
BH CV ECHO MEAS - LVIDD: 5.6 CM
BH CV ECHO MEAS - LVIDS: 4.7 CM
BH CV ECHO MEAS - LVOT AREA: 3.2 CM2
BH CV ECHO MEAS - LVOT DIAM: 2.03 CM
BH CV ECHO MEAS - LVPWD: 1.04 CM
BH CV ECHO MEAS - MED PEAK E' VEL: 5.2 CM/SEC
BH CV ECHO MEAS - MV MAX PG: 8.7 MMHG
BH CV ECHO MEAS - MV MEAN PG: 4.8 MMHG
BH CV ECHO MEAS - MV V2 VTI: 32.3 CM
BH CV ECHO MEAS - MVA(VTI): 1.69 CM2
BH CV ECHO MEAS - PA ACC TIME: 0.08 SEC
BH CV ECHO MEAS - PA V2 MAX: 77.4 CM/SEC
BH CV ECHO MEAS - PI END-D VEL: 76 CM/SEC
BH CV ECHO MEAS - RAP SYSTOLE: 15 MMHG
BH CV ECHO MEAS - RVSP: 40 MMHG
BH CV ECHO MEAS - SV(LVOT): 54.6 ML
BH CV ECHO MEAS - SV(MOD-SP2): 64.4 ML
BH CV ECHO MEAS - SV(MOD-SP4): 84 ML
BH CV ECHO MEAS - SVI(LVOT): 26.1 ML/M2
BH CV ECHO MEAS - SVI(MOD-SP2): 30.8 ML/M2
BH CV ECHO MEAS - SVI(MOD-SP4): 40.2 ML/M2
BH CV ECHO MEAS - TAPSE (>1.6): 1.16 CM
BH CV ECHO MEAS - TR MAX PG: 25 MMHG
BH CV ECHO MEAS - TR MAX VEL: 225.3 CM/SEC
BH CV XLRA - RV BASE: 5.3 CM
BH CV XLRA - RV LENGTH: 10.4 CM
BH CV XLRA - RV MID: 4.1 CM
BH CV XLRA - TDI S': 8.1 CM/SEC
LEFT ATRIUM VOLUME INDEX: 101.4 ML/M2
LV EF 2D ECHO EST: 30 %
LV EF BIPLANE MOD: 42.2 %

## 2025-02-07 ENCOUNTER — TELEPHONE (OUTPATIENT)
Dept: CARDIOLOGY | Facility: CLINIC | Age: 85
End: 2025-02-07
Payer: MEDICARE

## 2025-02-07 NOTE — TELEPHONE ENCOUNTER
[employer] Heart Failure index is 24 and recovery threshold is 8. Threshed is 20.  Thoracic impedance is 29.6 ohms. RR is 17.3 rpm. Night Heart Rate is 85 bpm.    Available HF diagnostics and trends indicate possible fluid accumulation/HF decompensation.      I attempted to call Mr. Thompson. No answer, no VM set up at this time. Per EPIC telephone note patient has recently c/o SOB and leg swelling. Echocardiogram performed on 02/06/2025.     I will reschedule patient for 1 week.

## 2025-02-10 ENCOUNTER — TELEPHONE (OUTPATIENT)
Dept: CARDIOLOGY | Facility: CLINIC | Age: 85
End: 2025-02-10
Payer: MEDICARE

## 2025-02-10 ENCOUNTER — TRANSCRIBE ORDERS (OUTPATIENT)
Dept: ADMINISTRATIVE | Facility: HOSPITAL | Age: 85
End: 2025-02-10
Payer: MEDICARE

## 2025-02-10 DIAGNOSIS — K74.69 FLORID CIRRHOSIS: Primary | ICD-10-CM

## 2025-02-10 DIAGNOSIS — I50.22 CHRONIC SYSTOLIC CONGESTIVE HEART FAILURE: Primary | Chronic | ICD-10-CM

## 2025-02-10 NOTE — TELEPHONE ENCOUNTER
Spoke with the patient and reviewed echo. He is aware to get his labs. Referral placed to the heart failure clinic in [place] for an appt this week.

## 2025-02-10 NOTE — TELEPHONE ENCOUNTER
----- Message from Estuardo Brothers sent at 2/7/2025 12:07 PM EST -----  He needs labs and appt in CHF clinic in [place] next week and FU with Ludmila in 2-3 weeks

## 2025-02-17 ENCOUNTER — HOSPITAL ENCOUNTER (EMERGENCY)
Facility: HOSPITAL | Age: 85
Discharge: HOME | End: 2025-02-17
Attending: EMERGENCY MEDICINE
Payer: MEDICARE

## 2025-02-17 VITALS
DIASTOLIC BLOOD PRESSURE: 72 MMHG | SYSTOLIC BLOOD PRESSURE: 125 MMHG | BODY MASS INDEX: 28.63 KG/M2 | OXYGEN SATURATION: 98 % | RESPIRATION RATE: 12 BRPM | TEMPERATURE: 97.5 F | HEIGHT: 70 IN | HEART RATE: 78 BPM | WEIGHT: 200 LBS

## 2025-02-17 DIAGNOSIS — R04.0 LEFT-SIDED NOSEBLEED: Primary | ICD-10-CM

## 2025-02-17 LAB
ALBUMIN SERPL-MCNC: 2.9 G/DL (ref 3.5–5.2)
ALBUMIN/GLOB SERPL: 1.3 G/DL
ALP SERPL-CCNC: 78 U/L (ref 39–117)
ALT SERPL W P-5'-P-CCNC: 9 U/L (ref 1–41)
ANION GAP SERPL CALCULATED.3IONS-SCNC: 7.3 MMOL/L (ref 5–15)
APTT PPP: 42.5 SECONDS (ref 24.5–35.9)
AST SERPL-CCNC: 19 U/L (ref 1–40)
BASOPHILS # BLD AUTO: 0.04 10*3/MM3 (ref 0–0.2)
BASOPHILS NFR BLD AUTO: 0.7 % (ref 0–1.5)
BILIRUB SERPL-MCNC: 0.6 MG/DL (ref 0–1.2)
BUN SERPL-MCNC: 36 MG/DL (ref 8–23)
BUN/CREAT SERPL: 28.3 (ref 7–25)
CALCIUM SPEC-SCNC: 7.9 MG/DL (ref 8.6–10.5)
CHLORIDE SERPL-SCNC: 102 MMOL/L (ref 98–107)
CO2 SERPL-SCNC: 28.7 MMOL/L (ref 22–29)
CREAT SERPL-MCNC: 1.27 MG/DL (ref 0.76–1.27)
DEPRECATED RDW RBC AUTO: 57.7 FL (ref 37–54)
EGFRCR SERPLBLD CKD-EPI 2021: 56.1 ML/MIN/1.73
EOSINOPHIL # BLD AUTO: 0.24 10*3/MM3 (ref 0–0.4)
EOSINOPHIL NFR BLD AUTO: 4.4 % (ref 0.3–6.2)
ERYTHROCYTE [DISTWIDTH] IN BLOOD BY AUTOMATED COUNT: 17 % (ref 12.3–15.4)
GLOBULIN UR ELPH-MCNC: 2.3 GM/DL
GLUCOSE SERPL-MCNC: 101 MG/DL (ref 65–99)
HCT VFR BLD AUTO: 42.2 % (ref 37.5–51)
HGB BLD-MCNC: 13.2 G/DL (ref 13–17.7)
HOLD SPECIMEN: NORMAL
HOLD SPECIMEN: NORMAL
IMM GRANULOCYTES # BLD AUTO: 0.02 10*3/MM3 (ref 0–0.05)
IMM GRANULOCYTES NFR BLD AUTO: 0.4 % (ref 0–0.5)
INR PPP: 2.43 (ref 0.9–1.1)
LYMPHOCYTES # BLD AUTO: 0.93 10*3/MM3 (ref 0.7–3.1)
LYMPHOCYTES NFR BLD AUTO: 17.1 % (ref 19.6–45.3)
MCH RBC QN AUTO: 29.1 PG (ref 26.6–33)
MCHC RBC AUTO-ENTMCNC: 31.3 G/DL (ref 31.5–35.7)
MCV RBC AUTO: 93 FL (ref 79–97)
MONOCYTES # BLD AUTO: 0.64 10*3/MM3 (ref 0.1–0.9)
MONOCYTES NFR BLD AUTO: 11.8 % (ref 5–12)
NEUTROPHILS NFR BLD AUTO: 3.56 10*3/MM3 (ref 1.7–7)
NEUTROPHILS NFR BLD AUTO: 65.6 % (ref 42.7–76)
NRBC BLD AUTO-RTO: 0 /100 WBC (ref 0–0.2)
PLATELET # BLD AUTO: 155 10*3/MM3 (ref 140–450)
PMV BLD AUTO: 11.4 FL (ref 6–12)
POTASSIUM SERPL-SCNC: 4.5 MMOL/L (ref 3.5–5.2)
PROT SERPL-MCNC: 5.2 G/DL (ref 6–8.5)
PROTHROMBIN TIME: 26.7 SECONDS (ref 11.6–15.1)
RBC # BLD AUTO: 4.54 10*6/MM3 (ref 4.14–5.8)
SODIUM SERPL-SCNC: 138 MMOL/L (ref 136–145)
WBC NRBC COR # BLD AUTO: 5.43 10*3/MM3 (ref 3.4–10.8)
WHOLE BLOOD HOLD COAG: NORMAL
WHOLE BLOOD HOLD SPECIMEN: NORMAL

## 2025-02-17 PROCEDURE — 85025 COMPLETE CBC W/AUTO DIFF WBC: CPT

## 2025-02-17 PROCEDURE — 36415 COLL VENOUS BLD VENIPUNCTURE: CPT

## 2025-02-17 PROCEDURE — 99283 EMERGENCY DEPT VISIT LOW MDM: CPT

## 2025-02-17 PROCEDURE — 85730 THROMBOPLASTIN TIME PARTIAL: CPT

## 2025-02-17 PROCEDURE — 80053 COMPREHEN METABOLIC PANEL: CPT

## 2025-02-17 PROCEDURE — 85610 PROTHROMBIN TIME: CPT

## 2025-02-17 RX ADMIN — SILVER NITRATE 1 APPLICATION: 38.21; 12.74 STICK TOPICAL at 19:55

## 2025-02-17 NOTE — ED NOTES
MEDICAL SCREENING:    Reason for Visit: Epistaxis    Patient initially seen in triage.  The patient was advised further evaluation and diagnostic testing will be needed, some of the treatment and testing will be initiated in the lobby in order to begin the process.  The patient will be returned to the waiting area for the time being and possibly be re-assessed by a subsequent ED provider.  The patient will be brought back to the treatment area in as timely manner as possible.     Danielle Leslie, APRN  02/17/25 1759

## 2025-02-18 ENCOUNTER — HOSPITAL ENCOUNTER (EMERGENCY)
Facility: HOSPITAL | Age: 85
Discharge: HOME | End: 2025-02-18
Attending: EMERGENCY MEDICINE | Admitting: EMERGENCY MEDICINE
Payer: MEDICARE

## 2025-02-18 ENCOUNTER — HOSPITAL ENCOUNTER (OUTPATIENT)
Dept: CARDIOLOGY | Facility: HOSPITAL | Age: 85
Discharge: HOME | End: 2025-02-18

## 2025-02-18 ENCOUNTER — HOSPITAL ENCOUNTER (OUTPATIENT)
Dept: CARDIOLOGY | Facility: HOSPITAL | Age: 85
Discharge: HOME | End: 2025-02-18
Admitting: PHYSICIAN ASSISTANT
Payer: MEDICARE

## 2025-02-18 VITALS
OXYGEN SATURATION: 91 % | WEIGHT: 203 LBS | BODY MASS INDEX: 29.06 KG/M2 | HEIGHT: 70 IN | HEART RATE: 77 BPM | SYSTOLIC BLOOD PRESSURE: 100 MMHG | DIASTOLIC BLOOD PRESSURE: 56 MMHG

## 2025-02-18 VITALS
BODY MASS INDEX: 28.63 KG/M2 | HEIGHT: 70 IN | TEMPERATURE: 97.2 F | WEIGHT: 199.96 LBS | HEART RATE: 77 BPM | SYSTOLIC BLOOD PRESSURE: 130 MMHG | DIASTOLIC BLOOD PRESSURE: 70 MMHG | RESPIRATION RATE: 18 BRPM | OXYGEN SATURATION: 96 %

## 2025-02-18 DIAGNOSIS — I50.22 CHRONIC HFREF (HEART FAILURE WITH REDUCED EJECTION FRACTION): ICD-10-CM

## 2025-02-18 DIAGNOSIS — I50.22 CHRONIC SYSTOLIC CONGESTIVE HEART FAILURE: Chronic | ICD-10-CM

## 2025-02-18 DIAGNOSIS — I50.22 CHRONIC HFREF (HEART FAILURE WITH REDUCED EJECTION FRACTION): Primary | ICD-10-CM

## 2025-02-18 DIAGNOSIS — R04.0 EPISTAXIS: Primary | ICD-10-CM

## 2025-02-18 DIAGNOSIS — I25.119 CORONARY ARTERY DISEASE INVOLVING NATIVE CORONARY ARTERY OF NATIVE HEART WITH ANGINA PECTORIS: Chronic | ICD-10-CM

## 2025-02-18 DIAGNOSIS — I48.21 PERMANENT ATRIAL FIBRILLATION: Chronic | ICD-10-CM

## 2025-02-18 DIAGNOSIS — Z95.2 H/O MITRAL VALVE REPLACEMENT WITH MECHANICAL VALVE: Primary | Chronic | ICD-10-CM

## 2025-02-18 LAB
ABSOLUTE LUNG FLUID CONTENT: 32 % (ref 20–35)
ALBUMIN SERPL-MCNC: 3 G/DL (ref 3.5–5.2)
ALBUMIN/GLOB SERPL: 1.4 G/DL
ALP SERPL-CCNC: 78 U/L (ref 39–117)
ALT SERPL W P-5'-P-CCNC: 6 U/L (ref 1–41)
ANION GAP SERPL CALCULATED.3IONS-SCNC: 5.6 MMOL/L (ref 5–15)
APTT PPP: 44.1 SECONDS (ref 24.5–35.9)
AST SERPL-CCNC: 20 U/L (ref 1–40)
BASOPHILS # BLD AUTO: 0.07 10*3/MM3 (ref 0–0.2)
BASOPHILS NFR BLD AUTO: 1.3 % (ref 0–1.5)
BILIRUB SERPL-MCNC: 0.7 MG/DL (ref 0–1.2)
BUN SERPL-MCNC: 35 MG/DL (ref 8–23)
BUN/CREAT SERPL: 28.5 (ref 7–25)
CALCIUM SPEC-SCNC: 7.8 MG/DL (ref 8.6–10.5)
CHLORIDE SERPL-SCNC: 103 MMOL/L (ref 98–107)
CO2 SERPL-SCNC: 28.4 MMOL/L (ref 22–29)
CREAT SERPL-MCNC: 1.23 MG/DL (ref 0.76–1.27)
DEPRECATED RDW RBC AUTO: 58 FL (ref 37–54)
EGFRCR SERPLBLD CKD-EPI 2021: 58.3 ML/MIN/1.73
EOSINOPHIL # BLD AUTO: 0.21 10*3/MM3 (ref 0–0.4)
EOSINOPHIL NFR BLD AUTO: 3.8 % (ref 0.3–6.2)
ERYTHROCYTE [DISTWIDTH] IN BLOOD BY AUTOMATED COUNT: 17.1 % (ref 12.3–15.4)
GLOBULIN UR ELPH-MCNC: 2.2 GM/DL
GLUCOSE SERPL-MCNC: 117 MG/DL (ref 65–99)
HCT VFR BLD AUTO: 42.3 % (ref 37.5–51)
HGB BLD-MCNC: 13.2 G/DL (ref 13–17.7)
IMM GRANULOCYTES # BLD AUTO: 0.01 10*3/MM3 (ref 0–0.05)
IMM GRANULOCYTES NFR BLD AUTO: 0.2 % (ref 0–0.5)
INR PPP: 2.37 (ref 0.9–1.1)
LYMPHOCYTES # BLD AUTO: 0.83 10*3/MM3 (ref 0.7–3.1)
LYMPHOCYTES NFR BLD AUTO: 15 % (ref 19.6–45.3)
MCH RBC QN AUTO: 29 PG (ref 26.6–33)
MCHC RBC AUTO-ENTMCNC: 31.2 G/DL (ref 31.5–35.7)
MCV RBC AUTO: 93 FL (ref 79–97)
MONOCYTES # BLD AUTO: 0.6 10*3/MM3 (ref 0.1–0.9)
MONOCYTES NFR BLD AUTO: 10.8 % (ref 5–12)
NEUTROPHILS NFR BLD AUTO: 3.82 10*3/MM3 (ref 1.7–7)
NEUTROPHILS NFR BLD AUTO: 68.9 % (ref 42.7–76)
NRBC BLD AUTO-RTO: 0 /100 WBC (ref 0–0.2)
NT-PROBNP SERPL-MCNC: 674.8 PG/ML (ref 0–1800)
PLATELET # BLD AUTO: 153 10*3/MM3 (ref 140–450)
PMV BLD AUTO: 11.5 FL (ref 6–12)
POTASSIUM SERPL-SCNC: 4.3 MMOL/L (ref 3.5–5.2)
PROT SERPL-MCNC: 5.2 G/DL (ref 6–8.5)
PROTHROMBIN TIME: 26.2 SECONDS (ref 11.6–15.1)
RBC # BLD AUTO: 4.55 10*6/MM3 (ref 4.14–5.8)
SODIUM SERPL-SCNC: 137 MMOL/L (ref 136–145)
WBC NRBC COR # BLD AUTO: 5.54 10*3/MM3 (ref 3.4–10.8)

## 2025-02-18 PROCEDURE — 94726 PLETHYSMOGRAPHY LUNG VOLUMES: CPT | Performed by: PHYSICIAN ASSISTANT

## 2025-02-18 PROCEDURE — 85730 THROMBOPLASTIN TIME PARTIAL: CPT | Performed by: EMERGENCY MEDICINE

## 2025-02-18 PROCEDURE — 36415 COLL VENOUS BLD VENIPUNCTURE: CPT

## 2025-02-18 PROCEDURE — 99283 EMERGENCY DEPT VISIT LOW MDM: CPT

## 2025-02-18 PROCEDURE — 80053 COMPREHEN METABOLIC PANEL: CPT | Performed by: EMERGENCY MEDICINE

## 2025-02-18 PROCEDURE — 85025 COMPLETE CBC W/AUTO DIFF WBC: CPT | Performed by: EMERGENCY MEDICINE

## 2025-02-18 PROCEDURE — 85610 PROTHROMBIN TIME: CPT | Performed by: EMERGENCY MEDICINE

## 2025-02-18 PROCEDURE — 83880 ASSAY OF NATRIURETIC PEPTIDE: CPT | Performed by: PHYSICIAN ASSISTANT

## 2025-02-18 RX ORDER — BUMETANIDE 1 MG/1
2 TABLET ORAL 2 TIMES DAILY
Qty: 360 TABLET | Refills: 1 | Status: SHIPPED | OUTPATIENT
Start: 2025-02-18 | End: 2025-02-21 | Stop reason: SDUPTHER

## 2025-02-18 RX ORDER — SACUBITRIL AND VALSARTAN 24; 26 MG/1; MG/1
1 TABLET, FILM COATED ORAL 2 TIMES DAILY
Qty: 180 TABLET | Refills: 1 | Status: SHIPPED | OUTPATIENT
Start: 2025-02-18 | End: 2025-02-21 | Stop reason: SDUPTHER

## 2025-02-18 RX ORDER — EPLERENONE 50 MG/1
50 TABLET, FILM COATED ORAL 2 TIMES DAILY
COMMUNITY
End: 2025-02-21 | Stop reason: SDUPTHER

## 2025-02-18 RX ORDER — SACUBITRIL AND VALSARTAN 24; 26 MG/1; MG/1
1 TABLET, FILM COATED ORAL 2 TIMES DAILY
Qty: 180 TABLET | Refills: 1 | Status: SHIPPED | OUTPATIENT
Start: 2025-02-18 | End: 2025-02-18 | Stop reason: SDUPTHER

## 2025-02-18 RX ORDER — CARVEDILOL 12.5 MG/1
6.25 TABLET ORAL 2 TIMES DAILY WITH MEALS
Qty: 180 TABLET | Refills: 3 | Status: SHIPPED | OUTPATIENT
Start: 2025-02-18 | End: 2025-02-21 | Stop reason: SDUPTHER

## 2025-02-18 RX ADMIN — PHENYLEPHRINE HYDROCHLORIDE 2 SPRAY: 0.25 SPRAY NASAL at 10:03
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
## 2025-02-18 NOTE — DISCHARGE INSTRUCTIONS
Home to rest.  Continue your medications as prescribed.  See Dr. Mcgee in the office in 2 days.  Return to the emergency department right away if symptoms worsen or any problems.

## 2025-02-18 NOTE — PROGRESS NOTES
[facility]  SANDRO Enriquez, Estuardo LYON IV, MD  [address] [facility] [address]  [address]  [place] [address]    Thank you for asking me to see Ezequiel Thompson for congestive heart failure.    HPI:     This is a 84 y.o. male with known past medical history of :    Mechanical mitral valve  HFrEF  Ischemic cardiomyopathy   History of CABG, [place], [place] (IDB)    Chronic systolic heart failure-EF 10 to 15% (IDB)    BiV ICD (BSC) - reportedly placed in [place], [place] (IDB)    Echocardiogram, 3/23/2021: EF 26-30%  TTE from 02/05/25 with EF 30%  CAD s/p CABG in [place], [place]  Permanent atrial fibrillation  CRT ICD in situ  Essential HTN  Hyperlipidemia  Hypothyroidism  CKD  Cirrhosis    Ezequiel Thompson presents for today for Heart Failure clinic evaluation.  The patient is typically seen by Tram Mcgee MD.  Patient's primary cardiologist is Dr. Estuardo Brothers.     Last known EF 30%.       02/18/2025 visit data/details regarding:   Dyspnea: Dyspnea with exertion  Lower extremity swelling: Swelling of bilateral lower extremities  Abdominal swelling: Denies  Home weight: Weight monitoring booklet provided during initial visit; has scale.   Home BP: BP monitoring booklet provided during initial visit; has BP cuff.    Home heart rate: HR monitoring booklet provided during initial visit  Daily activities of living: Performing on his own  Pillows/lying flat: Hospital bed   HF zone: Yellow/Green  Mr. Thompson is chest pain free.  He reports dyspnea with prolonged exertion.   In regard to medications, he reports he has been relying on samples of Entresto & Jardiance or Farxiga for his GDMT.    He reports he has been taking 2mg of Bumex in the morning and 1 in the evening for his swelling.  He reports some response to this regimen but does not think it is working as well as it previously was.    He reports issues with being lightheaded periodically, particularly with  position changes. BP in office today is 100/56.        Review of Systems - Review of Systems   Constitutional: Negative for decreased appetite and diaphoresis.   HENT:  Negative for congestion and ear pain.    Cardiovascular:  Positive for dyspnea on exertion and leg swelling.   Respiratory:  Positive for shortness of breath.    Endocrine: Negative for cold intolerance and heat intolerance.   Musculoskeletal:  Negative for arthritis and falls.   Gastrointestinal:  Negative for bloating and anorexia.   Genitourinary:  Negative for bladder incontinence and dysuria.   Psychiatric/Behavioral:  Negative for altered mental status and hallucinations.          All other systems were reviewed and were negative.    Patient Active Problem List   Diagnosis    Essential hypertension    Coronary artery disease involving native coronary artery of native heart with angina pectoris    Permanent atrial fibrillation    Chronic systolic congestive heart failure    Chronic pleural effusion    Cellulitis of foot    PAD (peripheral artery disease)    Hyperlipidemia LDL goal <70    Mechanical mitral valve in situ    Cellulitis    Hepatic cirrhosis    Venous ulcer    Cardiac resynchronization therapy defibrillator (CRT-D) in place    Microcytic anemia    Iron deficiency anemia, unspecified    Malabsorption       family history includes Emphysema in his father; Other in his brother; Stroke in his half-sister and mother.     reports that he has never smoked. He has never used smokeless tobacco. He reports current alcohol use. He reports that he does not use drugs.    Allergies   Allergen Reactions    Spironolactone Other (See Comments)     gynocomastia    Iodinated Contrast Media Hives    Iodine Hives    Levothyroxine Dizziness         Current Outpatient Medications:     allopurinol (ZYLOPRIM) 300 MG tablet, Take 1 tablet by mouth Daily., Disp: , Rfl:     bumetanide (BUMEX) 1 MG tablet, Take 2 tablets by mouth 2 (Two) Times a Day for 180  days., Disp: 360 tablet, Rfl: 1    carvedilol (COREG) 12.5 MG tablet, Take 0.5 tablets by mouth 2 (Two) Times a Day With Meals., Disp: 180 tablet, Rfl: 3    empagliflozin (Jardiance) 10 MG tablet tablet, Take 1 tablet by mouth Daily., Disp: 90 tablet, Rfl: 3    eplerenone (INSPRA) 50 MG tablet, Take 1 tablet by mouth 2 (Two) Times a Day., Disp: , Rfl:     potassium chloride (MICRO-K) 10 MEQ CR capsule, Take 2 capsules by mouth 3 (Three) Times a Day. 2AM At least 6 everyday, sometimes more if hand cramp/draw.  Might take 8  The most he would take is, Disp: , Rfl:     sacubitril-valsartan (Entresto) 24-26 MG tablet, Take 1 tablet by mouth 2 (Two) Times a Day., Disp: 180 tablet, Rfl: 1    warfarin (COUMADIN) 4 MG tablet, Take 1 tablet by mouth Daily. Mitral Valve  Dr. Mcgee  2.5-3.5, Disp: , Rfl:     empagliflozin (JARDIANCE) 10 MG tablet tablet, Take 1 tablet by mouth Daily for 28 days., Disp: 28 tablet, Rfl: 0    rosuvastatin (CRESTOR) 10 MG tablet, Take 1 tablet by mouth Daily. (Patient not taking: Reported on 2/18/2025), Disp: 90 tablet, Rfl: 3      Physical Exam:  I have reviewed the patient's current vital signs as documented in the patient's EMR.   Vitals:    02/18/25 1334   BP: 100/56   Pulse: 77   SpO2: 91%     Body mass index is 29.13 kg/m².       02/18/25  1334   Weight: 92.1 kg (203 lb)      Physical Exam  Vitals and nursing note reviewed.   Constitutional:       General: He is awake.      Appearance: Normal appearance.   HENT:      Head: Normocephalic.      Nose: No congestion or rhinorrhea.   Eyes:      Extraocular Movements: Extraocular movements intact.      Pupils: Pupils are equal, round, and reactive to light.   Cardiovascular:      Rate and Rhythm: Normal rate and regular rhythm.      Comments: Mechanical valve click appreciated  Pulmonary:      Breath sounds: No stridor. No wheezing or rales.   Abdominal:      General: Bowel sounds are normal. There is no distension.   Musculoskeletal:          General: Swelling present.   Skin:     General: Skin is warm and dry.      Coloration: Skin is not jaundiced.   Neurological:      Mental Status: He is alert and oriented to person, place, and time.   Psychiatric:         Behavior: Behavior is cooperative.          JVP: Volume/Pulsation: Normal.        DATA REVIEWED:     ---------------------------------------------------  TTE/ROSA:  Results for orders placed during the hospital encounter of 02/05/25    Adult Transthoracic Echo Complete W/ Cont if Necessary Per Protocol    Interpretation Summary    Left ventricular systolic function is moderately decreased. Estimated left ventricular EF = 30%    The left atrial cavity is severely dilated.    There is calcification of the aortic valve.    There is a mechanical mitral valve prosthesis present.  The mechanical valve appears to function normally.    Moderate tricuspid valve regurgitation is present.    Estimated right ventricular systolic pressure from tricuspid regurgitation is mildly elevated (40 mmHg).        LAST HEART CATH RESULT/IF AVAILABLE:     No results found for this or any previous visit.      -----------------------------------------------------  CXR/Imaging:   Imaging Results (Most Recent)       None            I personally reviewed and interpreted the CXR.      -----------------------------------------------------  CT:   No radiology results for the last 30 days.  I personally reviewed the images of the CT scan.  My personal interpretation is below.      ----------------------------------------------------      --------------------------------------------------------------------------------------------------    Laboratory evaluations:    Lab Results   Component Value Date    GLUCOSE 117 (H) 02/18/2025    BUN 35 (H) 02/18/2025    CREATININE 1.23 02/18/2025    EGFRIFNONA 50 (L) 02/01/2022    BCR 28.5 (H) 02/18/2025    K 4.3 02/18/2025    CO2 28.4 02/18/2025    CALCIUM 7.8 (L) 02/18/2025    ALBUMIN 3.0 (L)  "02/18/2025    AST 20 02/18/2025    ALT 6 02/18/2025     Lab Results   Component Value Date    WBC 5.54 02/18/2025    HGB 13.2 02/18/2025    HCT 42.3 02/18/2025    MCV 93.0 02/18/2025     02/18/2025     No results found for: \"CHOL\", \"CHLPL\", \"TRIG\", \"HDL\", \"LDL\", \"LDLDIRECT\"  No results found for: \"TSH\", \"N0XKQWE\", \"U1GVLYA\", \"THYROIDAB\"  No results found for: \"HGBA1C\"  Lab Results   Component Value Date    ALT 6 02/18/2025     No results found for: \"HGBA1C\"  Lab Results   Component Value Date    CREATININE 1.23 02/18/2025     Lab Results   Component Value Date    IRON 16 (L) 09/20/2022    TIBC 527 09/20/2022    FERRITIN 10.10 (L) 09/20/2022     Lab Results   Component Value Date    INR 2.37 (H) 02/18/2025    INR 2.43 (H) 02/17/2025    INR 2.69 (H) 12/16/2024    PROTIME 26.2 (H) 02/18/2025    PROTIME 26.7 (H) 02/17/2025    PROTIME 28.6 (H) 12/16/2024        Lab Results   Component Value Date    ABSOLUTELUNG 32 02/18/2025           1. Mechanical mitral valve in situ    2. Chronic HFrEF (heart failure with reduced ejection fraction)    3. Chronic systolic congestive heart failure    4. Coronary artery disease involving native coronary artery of native heart with angina pectoris    5. Permanent atrial fibrillation          ORDERS PLACED TODAY:  Orders Placed This Encounter   Procedures    ReDs Vest    Basic Metabolic Panel    Magnesium    proBNP    proBNP        Diagnoses and all orders for this visit:    1. Mechanical mitral valve in situ (Primary)  Overview:  Previous mechanical MVR  Echocardiogram (7/10/2020): Severe LV dysfunction.  Normal functioning mechanical mitral valve.  Moderate to severe TR, mild pulmonary hypertension with RVSP 45 mmHg  Echocardiogram (3/23/2021): LVEF 26-30% with dilated LV.  Mechanical mitral valve with mild MR.  RVSP 45-55 mmHg      2. Chronic HFrEF (heart failure with reduced ejection fraction)  -     Basic Metabolic Panel; Standing  -     Basic Metabolic Panel  -     Magnesium; " Standing  -     Magnesium  -     proBNP; Standing  -     proBNP  -     proBNP; Future    3. Chronic systolic congestive heart failure  Overview:  Echocardiogram (7/10/2020): Severe LV dysfunction.  Normal functioning mechanical mitral valve.  Moderate to severe TR, mild pulmonary hypertension with RVSP 45 mmHg  Echocardiogram (3/23/2021): LVEF 26-30% with dilated LV.  Mechanical mitral valve with mild MR.  RVSP 45-55 mmHg  Intolerant to spironolactone    Orders:  -     Basic Metabolic Panel; Standing  -     Basic Metabolic Panel  -     Magnesium; Standing  -     Magnesium  -     proBNP; Standing  -     proBNP  -     ReDs Vest  -     proBNP; Future  -     Discontinue: sacubitril-valsartan (Entresto) 24-26 MG tablet; Take 1 tablet by mouth 2 (Two) Times a Day for 180 days.  Dispense: 180 tablet; Refill: 1  -     Discontinue: empagliflozin (Jardiance) 10 MG tablet tablet; Take 1 tablet by mouth Daily.  Dispense: 90 tablet; Refill: 3  -     sacubitril-valsartan (Entresto) 24-26 MG tablet; Take 1 tablet by mouth 2 (Two) Times a Day.  Dispense: 180 tablet; Refill: 1  -     empagliflozin (Jardiance) 10 MG tablet tablet; Take 1 tablet by mouth Daily.  Dispense: 90 tablet; Refill: 3  -     bumetanide (BUMEX) 1 MG tablet; Take 2 tablets by mouth 2 (Two) Times a Day for 180 days.  Dispense: 360 tablet; Refill: 1  -     carvedilol (COREG) 12.5 MG tablet; Take 0.5 tablets by mouth 2 (Two) Times a Day With Meals.  Dispense: 180 tablet; Refill: 3    4. Coronary artery disease involving native coronary artery of native heart with angina pectoris  Overview:  CABG in [place], [place]    Orders:  -     carvedilol (COREG) 12.5 MG tablet; Take 0.5 tablets by mouth 2 (Two) Times a Day With Meals.  Dispense: 180 tablet; Refill: 3    5. Permanent atrial fibrillation  Overview:  [id number] 6 (age >75, CHF, CAD, HTN, CVA)    Orders:  -     carvedilol (COREG) 12.5 MG tablet; Take 0.5 tablets by mouth 2 (Two) Times a Day With Meals.  Dispense: 180  tablet; Refill: 3    Other orders  -     empagliflozin (JARDIANCE) 10 MG tablet tablet; Take 1 tablet by mouth Daily for 28 days.  Dispense: 28 tablet; Refill: 0             MEDS ORDERED TODAY:    New Medications Ordered This Visit   Medications    sacubitril-valsartan (Entresto) 24-26 MG tablet     Sig: Take 1 tablet by mouth 2 (Two) Times a Day.     Dispense:  180 tablet     Refill:  1    empagliflozin (Jardiance) 10 MG tablet tablet     Sig: Take 1 tablet by mouth Daily.     Dispense:  90 tablet     Refill:  3    bumetanide (BUMEX) 1 MG tablet     Sig: Take 2 tablets by mouth 2 (Two) Times a Day for 180 days.     Dispense:  360 tablet     Refill:  1    carvedilol (COREG) 12.5 MG tablet     Sig: Take 0.5 tablets by mouth 2 (Two) Times a Day With Meals.     Dispense:  180 tablet     Refill:  3    empagliflozin (JARDIANCE) 10 MG tablet tablet     Sig: Take 1 tablet by mouth Daily for 28 days.     Dispense:  28 tablet     Refill:  0        ---------------------------------------------------------------------------------------------------------------------------          ASSESSMENT/PLAN:      Diagnosis Plan   1. Mechanical mitral valve in situ        2. Chronic HFrEF (heart failure with reduced ejection fraction)  Basic Metabolic Panel    Basic Metabolic Panel    Magnesium    Magnesium    proBNP    proBNP    proBNP      3. Chronic systolic congestive heart failure  Basic Metabolic Panel    Basic Metabolic Panel    Magnesium    Magnesium    proBNP    proBNP    ReDs Vest    proBNP    sacubitril-valsartan (Entresto) 24-26 MG tablet    empagliflozin (Jardiance) 10 MG tablet tablet    bumetanide (BUMEX) 1 MG tablet    carvedilol (COREG) 12.5 MG tablet      4. Coronary artery disease involving native coronary artery of native heart with angina pectoris  carvedilol (COREG) 12.5 MG tablet      5. Permanent atrial fibrillation  carvedilol (COREG) 12.5 MG tablet          not acutely decompensated chronic moderate systolic heart  failure. CHF.     NYHA stage Stage C: Structural heart disease is present AND symptoms have occurredFC-Class II: Slight limitation of physical activity. Comfortable at rest Ordinary physical activity results in fatigue, palpitation, dyspnea (shortness of breath).     Today, Patient is mildly volume overloadedand with  Moderate perfusion. The patient's hemodynamics are currently acceptable. HR is: normal and is at goal. BP/MAP was reviewed and there is not room for medication up-titration.  Clinical trajectory was assessed and haswaxed and waned.     CHF GOAL DIRECTED MEDICAL THERAPY FOR PATIENT ADDRESSED/ADJUSTED:     GDMT: HFrEF    Drug Class   Drug   Dose Last Dose Adjustment Notes   ACEi/ARB/ARNI Entresto 24-26mg BID     Beta Blocker Coreg Reduced to 6.25mg BID     MRA BP borderline      SGLT2i Jardiance 10mg  N/A   Secondaries if applicable:          -CHF Specific BB:    Carvedilol  at dose of 12.5mg BID reduced to 6.25mg BID due to lightheadedness with /50s in office today.  We have provided patient with BP log and asked him to keep this for further monitoring to assist with titrations.    We discussed processes/benefits of HF clinic including nursing, pharmacist, and provider evaluation during each visit with ability for in office ReDS vest, labs, and ability to provide IV diuresis in the clinic with close outpatient monitoring.  Additionally, patient was educated about the availability of delivery of medications to patient's clinic room prior to leaving the building which assists with medication compliance and insures medications are in hands when changes are made (if patient opts for apothecary usage) with thorough guidance regarding changes and medication schedule provided.          -ACE/ARB/ARNi:   Entresto on board; Coupon card obtained that brought Entresto price down to $10 for patient with supply delivered by pharmacy prior to patient leaving clinic.      -MRA:   BP too low to consider today.      -SGLT2 inhibitor therapy:   Recommend continuing Jardiance (empagliflozin) 10mg with quarterly assessment of GFR.  Pharmacy working on further assistance with SGTL2i therapy as copay cards with this only brought medication down to $140ish monthly.  Please see pharmacy note.   Pt was advised SEs, some severe, including hypersensitivity and Payal's; coupled with discussion regarding common side effects of UTIs and female genital mycotic infections were discussed. If you will be NPO, or are sick (poor PO intake, N&V) please hold the medication until you are back to a normal diet.     -Diuretic regimen:   ReDS Vest reading for. 02/20/25 is  32; ReDs Vest reading reviewed with patient.    Bumex increased to 2mg in the morning and 2 mg in the afternoon from 2mg in the morning and 1 mg in the afternoon to assist with fluid retention.   BMP, Mag, & ProBNP reviewed with patient.      -Fluid restriction/Sodium restriction:   Requested 2000 ml restriction  Patient has been asked to weigh daily and was provided with a printed diuretic strategy.  1,500 mg Na restriction was discussed.    -Devices if applicable:       -Acute and/or Chronic underlying conditions other than HF addressed during visit:   Permanent atrial fibrillation:   Mechanical valve mitral:   ASCVD s/p prior CABG:   Continue Coumadin.     Identifiable barriers to Heart Failure Self-care:   Medical Barriers: Frailty  Social Barriers: Financial [place] of HF treatment        BMI is >= 25 and <30. (Overweight) The following options were offered after discussion;: Heart failure dietary management            This document has been electronically signed by Liz Lazaro PA-C  February 20, 2025 08:21 EST      Dictated Utilizing Dragon Dictation: Part of this note may be an electronic transcription/translation of spoken language to printed text using the Dragon Dictation System.    Follow-up appointment and medication changes provided in hand delivered  After Visit Summary as well as reviewed in the room.

## 2025-02-18 NOTE — PROGRESS NOTES
Heart Failure Clinic    Date: 02/18/25     Vitals:    02/18/25 1334   BP: 100/56   Pulse: 77   SpO2: 91%    Weight 203    Method of arrival: Ambulatory    Weighing self daily: Most days    Monitoring Heart Failure Zones: No    Today's HF Zone: Reviewed Zones    Taking medications as prescribed: Yes    Edema Yes    Shortness of Air: Yes with activity    Number of pillows used at night:adjustable bed    Educational Materials given:  AVS, Heart Success patient book                                                                         ReDS Value: 32  25-35 Optimal Value Status      Yamilka Ellington RN 02/18/25 13:36 EST
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
## 2025-02-19 NOTE — PROGRESS NOTES
Cardiology Outpatient Visit      Identification: Ezequiel Thompson is a 84 y.o. male who resides in [place], [place].     Reason for visit:  HFrEF  Mechanical mitral valve  CRT ICD  CAD      Subjective      Luis called the office recently with complaints of shortness of breath and waking up at night having difficulty breathing.  We recommended he undergo echo, blood work.  Echo showed stable LVEF 30% and normal functioning mechanical mitral valve.  proBNP 674, gray zone for age.  The patient is presently taking 3 mg of Bumex daily, empagliflozin, eplerenone, Entresto for heart failure.    Patient reports having episodes of nosebleeds when he bends over.  He has been to the emergency room twice for this.  He is on warfarin due to mechanical mitral valve.    Patient underwent recent abdominal ultrasound which revealed nodularity of the liver consistent with cirrhosis.  Albumin 3.    Review of Systems   Constitutional: Negative for malaise/fatigue.   Eyes:  Negative for vision loss in left eye and vision loss in right eye.   Cardiovascular:  Positive for dyspnea on exertion and orthopnea. Negative for chest pain, near-syncope, palpitations, paroxysmal nocturnal dyspnea and syncope.   Musculoskeletal:  Negative for myalgias.   Neurological:  Negative for brief paralysis, excessive daytime sleepiness, focal weakness, numbness, paresthesias and weakness.   All other systems reviewed and are negative.      Allergies   Allergen Reactions    Spironolactone Other (See Comments)     gynocomastia    Iodinated Contrast Media Hives    Iodine Hives    Levothyroxine Dizziness         Current Outpatient Medications   Medication Instructions    allopurinol (ZYLOPRIM) 300 mg, Daily    bumetanide (BUMEX) 2 mg, Oral, 2 Times Daily    carvedilol (COREG) 6.25 mg, Oral, 2 Times Daily With Meals    empagliflozin (JARDIANCE) 10 mg, Oral, Daily    eplerenone (INSPRA) 50 mg, Oral, 2 Times Daily    potassium chloride (MICRO-K) 10 MEQ CR  "capsule 20 mEq, 3 Times Daily    sacubitril-valsartan (Entresto) 24-26 MG tablet 1 tablet, Oral, 2 Times Daily    warfarin (COUMADIN) 4 mg, Daily Warfarin         Objective     /62 (BP Location: Right arm, Patient Position: Sitting, Cuff Size: Adult)   Pulse 80   Ht 177.8 cm (70\")   Wt 92.1 kg (203 lb)   SpO2 97%   BMI 29.13 kg/m²       Constitutional:       Appearance: Healthy appearance.   Eyes:      General: No scleral icterus.  Neck:      Thyroid: No thyroid mass.      Vascular: No carotid bruit or JVD. JVD normal.   Pulmonary:      Effort: Pulmonary effort is normal.      Breath sounds: Normal breath sounds.   Cardiovascular:      Normal rate. Regular rhythm.      Murmurs: There is no murmur.      No gallop.    Edema:     Peripheral edema absent.   Skin:     General: Skin is warm. There is no cyanosis.   Neurological:      General: No focal deficit present.      Mental Status: Alert.   Psychiatric:         Attention and Perception: Attention normal.         Result Review  (reviewed with patient):            Lab Results   Component Value Date    GLUCOSE 117 (H) 02/18/2025    BUN 35 (H) 02/18/2025    CREATININE 1.23 02/18/2025     02/18/2025    K 4.3 02/18/2025     02/18/2025    CALCIUM 7.8 (L) 02/18/2025    PROTEINTOT 5.2 (L) 02/18/2025    ALBUMIN 3.0 (L) 02/18/2025    ALT 6 02/18/2025    AST 20 02/18/2025    ALKPHOS 78 02/18/2025    BILITOT 0.7 02/18/2025    GLOB 2.2 02/18/2025    AGRATIO 1.4 02/18/2025    BCR 28.5 (H) 02/18/2025    ANIONGAP 5.6 02/18/2025    EGFR 58.3 (L) 02/18/2025     Lab Results   Component Value Date    WBC 5.54 02/18/2025    HGB 13.2 02/18/2025    HCT 42.3 02/18/2025    MCV 93.0 02/18/2025     02/18/2025         Lab 02/18/25  1035   PROBNP 674.8             Assessment     Diagnoses and all orders for this visit:    1. Chronic systolic congestive heart failure (Primary)  Overview:  Echocardiogram (7/10/2020): Severe LV dysfunction.  Normal functioning " mechanical mitral valve.  Moderate to severe TR, mild pulmonary hypertension with RVSP 45 mmHg  Echocardiogram (3/23/2021): LVEF 26-30% with dilated LV.  Mechanical mitral valve with mild MR.  RVSP 45-55 mmHg  Intolerant to spironolactone  Echo (2/5/2025): LVEF 30%.  Dilated left atrium.  Normal functioning mechanical mitral valve.  Moderate TR.  RVSP 40 mmHg.  Intolerant to spironolactone (gynecomastia)    Assessment & Plan:  Recent worsening shortness of breath with stable echo and gray zone proBNP  Recommend continuing present medical therapy including Bumex 3 mg daily.    Orders:  -     eplerenone (INSPRA) 50 MG tablet; Take 1 tablet by mouth 2 (Two) Times a Day.  Dispense: 180 tablet; Refill: 3  -     carvedilol (COREG) 6.25 MG tablet; Take 1 tablet by mouth 2 (Two) Times a Day With Meals.  Dispense: 180 tablet; Refill: 3  -     sacubitril-valsartan (Entresto) 24-26 MG tablet; Take 1 tablet by mouth 2 (Two) Times a Day.  Dispense: 180 tablet; Refill: 1  -     empagliflozin (Jardiance) 10 MG tablet tablet; Take 1 tablet by mouth Daily.  Dispense: 90 tablet; Refill: 3  -     bumetanide (BUMEX) 1 MG tablet; Take 2 tablets by mouth 2 (Two) Times a Day.  Dispense: 360 tablet; Refill: 3    2. Epistaxis  Assessment & Plan:  Recurrent episodes of epistaxis  On warfarin for mechanical mitral valve (high risk position)  Refer to ENT for treatment of recurrent epistaxis    Orders:  -     Ambulatory Referral to ENT (Otolaryngology)    3. Mechanical mitral valve in situ  Overview:  Previous mechanical MVR  Echocardiogram (7/10/2020): Severe LV dysfunction.  Normal functioning mechanical mitral valve.  Moderate to severe TR, mild pulmonary hypertension with RVSP 45 mmHg  Echocardiogram (3/23/2021): LVEF 26-30% with dilated LV.  Mechanical mitral valve with mild MR.  RVSP 45-55 mmHg  Echo (2/5/2025): LVEF 30%.  Dilated left atrium.  Normal functioning mechanical mitral valve.  Moderate TR.  RVSP 40 mmHg.    Assessment &  Plan:  Normal mechanical mitral valve function on recent echo  Continue warfarin with goal INR 2.5-3  Resume aspirin 81 mg daily      4. Coronary artery disease involving native coronary artery of native heart with angina pectoris  Overview:  CABG in [place], [place]    Assessment & Plan:  No angina  Resume aspirin 81 mg daily, beta-blocker    Orders:  -     carvedilol (COREG) 6.25 MG tablet; Take 1 tablet by mouth 2 (Two) Times a Day With Meals.  Dispense: 180 tablet; Refill: 3    5. Permanent atrial fibrillation  Overview:  [id number] 6 (age >75, CHF, CAD, HTN, CVA)    Assessment & Plan:  Asymptomatic  Continue warfarin for mechanical mitral valve and A-fib      6. Cardiac resynchronization therapy defibrillator (CRT-D) in place  Overview:  Hilmar S[employer] ICD for primary prevention      7. Hepatic cirrhosis, unspecified hepatic cirrhosis type, unspecified whether ascites present  Overview:  Added automatically from request for surgery [id number]            Plan   Refer to ENT for treatment of recurrent epistaxis  Continue GDMT for HFrEF  Consider evaluation for cirrhosis      Follow-up   Return in about 8 months (around 10/21/2025).        Buddy Brothers MD, FACC, [facility]  2/21/2025

## 2025-02-20 ENCOUNTER — HOSPITAL ENCOUNTER (OUTPATIENT)
Dept: ULTRASOUND IMAGING | Facility: HOSPITAL | Age: 85
Discharge: HOME | End: 2025-02-20
Admitting: INTERNAL MEDICINE
Payer: MEDICARE

## 2025-02-20 DIAGNOSIS — K74.69 FLORID CIRRHOSIS: ICD-10-CM

## 2025-02-20 PROCEDURE — 76700 US EXAM ABDOM COMPLETE: CPT

## 2025-02-21 ENCOUNTER — OFFICE VISIT (OUTPATIENT)
Dept: CARDIOLOGY | Facility: CLINIC | Age: 85
End: 2025-02-21
Payer: MEDICARE

## 2025-02-21 VITALS
DIASTOLIC BLOOD PRESSURE: 62 MMHG | HEART RATE: 80 BPM | SYSTOLIC BLOOD PRESSURE: 110 MMHG | HEIGHT: 70 IN | OXYGEN SATURATION: 97 % | WEIGHT: 203 LBS | BODY MASS INDEX: 29.06 KG/M2

## 2025-02-21 DIAGNOSIS — I48.21 PERMANENT ATRIAL FIBRILLATION: Chronic | ICD-10-CM

## 2025-02-21 DIAGNOSIS — Z95.810 CARDIAC RESYNCHRONIZATION THERAPY DEFIBRILLATOR (CRT-D) IN PLACE: ICD-10-CM

## 2025-02-21 DIAGNOSIS — K74.60 HEPATIC CIRRHOSIS, UNSPECIFIED HEPATIC CIRRHOSIS TYPE, UNSPECIFIED WHETHER ASCITES PRESENT: ICD-10-CM

## 2025-02-21 DIAGNOSIS — I50.22 CHRONIC SYSTOLIC CONGESTIVE HEART FAILURE: Primary | Chronic | ICD-10-CM

## 2025-02-21 DIAGNOSIS — I25.119 CORONARY ARTERY DISEASE INVOLVING NATIVE CORONARY ARTERY OF NATIVE HEART WITH ANGINA PECTORIS: Chronic | ICD-10-CM

## 2025-02-21 DIAGNOSIS — Z95.2 H/O MITRAL VALVE REPLACEMENT WITH MECHANICAL VALVE: Chronic | ICD-10-CM

## 2025-02-21 DIAGNOSIS — R04.0 EPISTAXIS: ICD-10-CM

## 2025-02-21 PROBLEM — J90 CHRONIC PLEURAL EFFUSION: Status: RESOLVED | Noted: 2021-06-04 | Resolved: 2025-02-21

## 2025-02-21 PROCEDURE — 1160F RVW MEDS BY RX/DR IN RCRD: CPT | Performed by: INTERNAL MEDICINE

## 2025-02-21 PROCEDURE — 99214 OFFICE O/P EST MOD 30 MIN: CPT | Performed by: INTERNAL MEDICINE

## 2025-02-21 PROCEDURE — 3074F SYST BP LT 130 MM HG: CPT | Performed by: INTERNAL MEDICINE

## 2025-02-21 PROCEDURE — 3078F DIAST BP <80 MM HG: CPT | Performed by: INTERNAL MEDICINE

## 2025-02-21 PROCEDURE — 1159F MED LIST DOCD IN RCRD: CPT | Performed by: INTERNAL MEDICINE

## 2025-02-21 RX ORDER — EPLERENONE 50 MG/1
50 TABLET, FILM COATED ORAL 2 TIMES DAILY
Qty: 180 TABLET | Refills: 3 | Status: SHIPPED | OUTPATIENT
Start: 2025-02-21

## 2025-02-21 RX ORDER — BUMETANIDE 1 MG/1
2 TABLET ORAL 2 TIMES DAILY
Qty: 360 TABLET | Refills: 3 | Status: SHIPPED | OUTPATIENT
Start: 2025-02-21

## 2025-02-21 RX ORDER — CARVEDILOL 6.25 MG/1
6.25 TABLET ORAL 2 TIMES DAILY WITH MEALS
Qty: 180 TABLET | Refills: 3 | Status: SHIPPED | OUTPATIENT
Start: 2025-02-21

## 2025-02-21 RX ORDER — SACUBITRIL AND VALSARTAN 24; 26 MG/1; MG/1
1 TABLET, FILM COATED ORAL 2 TIMES DAILY
Qty: 180 TABLET | Refills: 1 | Status: SHIPPED | OUTPATIENT
Start: 2025-02-21

## 2025-02-21 NOTE — ASSESSMENT & PLAN NOTE
Recurrent episodes of epistaxis  On warfarin for mechanical mitral valve (high risk position)  Refer to ENT for treatment of recurrent epistaxis

## 2025-02-21 NOTE — ASSESSMENT & PLAN NOTE
Normal mechanical mitral valve function on recent echo  Continue warfarin with goal INR 2.5-3  Resume aspirin 81 mg daily

## 2025-02-21 NOTE — ASSESSMENT & PLAN NOTE
Recent worsening shortness of breath with stable echo and gray zone proBNP  Recommend continuing present medical therapy including Bumex 3 mg daily.

## 2025-02-24 ENCOUNTER — HOSPITAL ENCOUNTER (OUTPATIENT)
Dept: INFUSION THERAPY | Facility: HOSPITAL | Age: 85
Discharge: HOME | End: 2025-02-24
Admitting: INTERNAL MEDICINE
Payer: MEDICARE

## 2025-02-24 VITALS
DIASTOLIC BLOOD PRESSURE: 53 MMHG | HEART RATE: 75 BPM | SYSTOLIC BLOOD PRESSURE: 103 MMHG | OXYGEN SATURATION: 98 % | RESPIRATION RATE: 18 BRPM

## 2025-02-24 DIAGNOSIS — K74.69: Primary | ICD-10-CM

## 2025-02-24 PROCEDURE — P9047 ALBUMIN (HUMAN), 25%, 50ML: HCPCS | Performed by: INTERNAL MEDICINE

## 2025-02-24 PROCEDURE — 96375 TX/PRO/DX INJ NEW DRUG ADDON: CPT

## 2025-02-24 PROCEDURE — 25010000002 FUROSEMIDE PER 20 MG: Performed by: INTERNAL MEDICINE

## 2025-02-24 PROCEDURE — 96365 THER/PROPH/DIAG IV INF INIT: CPT

## 2025-02-24 PROCEDURE — 25010000002 ALBUMIN HUMAN 25% PER 50 ML: Performed by: INTERNAL MEDICINE

## 2025-02-24 PROCEDURE — 96374 THER/PROPH/DIAG INJ IV PUSH: CPT

## 2025-02-24 RX ORDER — ALBUMIN (HUMAN) 12.5 G/50ML
25 SOLUTION INTRAVENOUS ONCE
Status: COMPLETED | OUTPATIENT
Start: 2025-02-24 | End: 2025-02-24

## 2025-02-24 RX ORDER — FUROSEMIDE 10 MG/ML
40 INJECTION INTRAMUSCULAR; INTRAVENOUS ONCE
Status: CANCELLED
Start: 2025-02-24 | End: 2025-02-24

## 2025-02-24 RX ORDER — ALBUMIN (HUMAN) 12.5 G/50ML
25 SOLUTION INTRAVENOUS ONCE
Status: CANCELLED
Start: 2025-02-24 | End: 2025-02-24

## 2025-02-24 RX ORDER — FUROSEMIDE 10 MG/ML
40 INJECTION INTRAMUSCULAR; INTRAVENOUS ONCE
Status: COMPLETED | OUTPATIENT
Start: 2025-02-24 | End: 2025-02-24

## 2025-02-24 RX ADMIN — ALBUMIN (HUMAN) 25 G: 0.25 INJECTION, SOLUTION INTRAVENOUS at 11:03

## 2025-02-24 RX ADMIN — FUROSEMIDE 40 MG: 10 INJECTION, SOLUTION INTRAMUSCULAR; INTRAVENOUS at 11:39

## 2025-02-25 ENCOUNTER — TRANSCRIBE ORDERS (OUTPATIENT)
Dept: ADMINISTRATIVE | Facility: HOSPITAL | Age: 85
End: 2025-02-25
Payer: MEDICARE

## 2025-02-27 ENCOUNTER — TELEPHONE (OUTPATIENT)
Dept: CARDIOLOGY | Facility: CLINIC | Age: 85
End: 2025-02-27

## 2025-02-27 NOTE — TELEPHONE ENCOUNTER
"Relay     \"This appointment has been made. A letter has been sent. That office is mailing paperwork to the patient. April 11, 2025 at 2952. [address] [place] [address]\"                 "

## 2025-02-27 NOTE — TELEPHONE ENCOUNTER
Caller: Ines Coats    Relationship: Emergency Contact    Best call back number: [phone]     What is the best time to reach you: ANY    Who are you requesting to speak with (clinical staff, provider,  specific staff member): CLINICAL        What was the call regarding: PATIENT'S WIFE CALLED TO INQUIRE ON THE STATUS OF THE REFERRAL THAT DR SWAN IS SUPPOSED TO DO FOR AN EAR, NOSE, & THROAT DOCTOR. PLEASE CONTACT [facility] [employer] AND ADVISE ON THE STATUS OF THIS REFERRAL.    Is it okay if the provider responds through [employer]: PLEASE CALL

## 2025-03-01 ENCOUNTER — HOSPITAL ENCOUNTER (EMERGENCY)
Facility: HOSPITAL | Age: 85
Discharge: HOME | End: 2025-03-01
Payer: MEDICARE

## 2025-03-01 VITALS
RESPIRATION RATE: 12 BRPM | DIASTOLIC BLOOD PRESSURE: 62 MMHG | OXYGEN SATURATION: 94 % | TEMPERATURE: 97.6 F | HEIGHT: 70 IN | WEIGHT: 203 LBS | SYSTOLIC BLOOD PRESSURE: 106 MMHG | BODY MASS INDEX: 29.06 KG/M2 | HEART RATE: 76 BPM

## 2025-03-01 DIAGNOSIS — R04.0 LEFT-SIDED NOSEBLEED: Primary | ICD-10-CM

## 2025-03-01 LAB
ALBUMIN SERPL-MCNC: 2.9 G/DL (ref 3.5–5.2)
ALBUMIN/GLOB SERPL: 1.3 G/DL
ALP SERPL-CCNC: 75 U/L (ref 39–117)
ALT SERPL W P-5'-P-CCNC: <5 U/L (ref 1–41)
ANION GAP SERPL CALCULATED.3IONS-SCNC: 6.7 MMOL/L (ref 5–15)
ANISOCYTOSIS BLD QL: NORMAL
AST SERPL-CCNC: 18 U/L (ref 1–40)
BASOPHILS # BLD AUTO: 0.07 10*3/MM3 (ref 0–0.2)
BASOPHILS NFR BLD AUTO: 1.4 % (ref 0–1.5)
BILIRUB SERPL-MCNC: 0.6 MG/DL (ref 0–1.2)
BUN SERPL-MCNC: 29 MG/DL (ref 8–23)
BUN/CREAT SERPL: 25.4 (ref 7–25)
CALCIUM SPEC-SCNC: 8.1 MG/DL (ref 8.6–10.5)
CHLORIDE SERPL-SCNC: 105 MMOL/L (ref 98–107)
CO2 SERPL-SCNC: 27.3 MMOL/L (ref 22–29)
CREAT SERPL-MCNC: 1.14 MG/DL (ref 0.76–1.27)
DEPRECATED RDW RBC AUTO: 59.7 FL (ref 37–54)
EGFRCR SERPLBLD CKD-EPI 2021: 63.8 ML/MIN/1.73
EOSINOPHIL # BLD AUTO: 0.2 10*3/MM3 (ref 0–0.4)
EOSINOPHIL NFR BLD AUTO: 3.9 % (ref 0.3–6.2)
ERYTHROCYTE [DISTWIDTH] IN BLOOD BY AUTOMATED COUNT: 17.2 % (ref 12.3–15.4)
GLOBULIN UR ELPH-MCNC: 2.3 GM/DL
GLUCOSE SERPL-MCNC: 99 MG/DL (ref 65–99)
HCT VFR BLD AUTO: 40.3 % (ref 37.5–51)
HGB BLD-MCNC: 12.5 G/DL (ref 13–17.7)
IMM GRANULOCYTES # BLD AUTO: 0.02 10*3/MM3 (ref 0–0.05)
IMM GRANULOCYTES NFR BLD AUTO: 0.4 % (ref 0–0.5)
INR PPP: 1.98 (ref 0.9–1.1)
LARGE PLATELETS: NORMAL
LYMPHOCYTES # BLD AUTO: 0.86 10*3/MM3 (ref 0.7–3.1)
LYMPHOCYTES NFR BLD AUTO: 16.8 % (ref 19.6–45.3)
MCH RBC QN AUTO: 29.2 PG (ref 26.6–33)
MCHC RBC AUTO-ENTMCNC: 31 G/DL (ref 31.5–35.7)
MCV RBC AUTO: 94.2 FL (ref 79–97)
MONOCYTES # BLD AUTO: 0.6 10*3/MM3 (ref 0.1–0.9)
MONOCYTES NFR BLD AUTO: 11.7 % (ref 5–12)
NEUTROPHILS NFR BLD AUTO: 3.38 10*3/MM3 (ref 1.7–7)
NEUTROPHILS NFR BLD AUTO: 65.8 % (ref 42.7–76)
NRBC BLD AUTO-RTO: 0 /100 WBC (ref 0–0.2)
PLATELET # BLD AUTO: 126 10*3/MM3 (ref 140–450)
PMV BLD AUTO: 11.6 FL (ref 6–12)
POTASSIUM SERPL-SCNC: 4.5 MMOL/L (ref 3.5–5.2)
PROT SERPL-MCNC: 5.2 G/DL (ref 6–8.5)
PROTHROMBIN TIME: 22.8 SECONDS (ref 11.6–15.1)
RBC # BLD AUTO: 4.28 10*6/MM3 (ref 4.14–5.8)
SODIUM SERPL-SCNC: 139 MMOL/L (ref 136–145)
WBC NRBC COR # BLD AUTO: 5.13 10*3/MM3 (ref 3.4–10.8)

## 2025-03-01 PROCEDURE — 85610 PROTHROMBIN TIME: CPT

## 2025-03-01 PROCEDURE — 36415 COLL VENOUS BLD VENIPUNCTURE: CPT

## 2025-03-01 PROCEDURE — 99283 EMERGENCY DEPT VISIT LOW MDM: CPT

## 2025-03-01 PROCEDURE — 80053 COMPREHEN METABOLIC PANEL: CPT

## 2025-03-01 PROCEDURE — 85025 COMPLETE CBC W/AUTO DIFF WBC: CPT

## 2025-03-01 PROCEDURE — 85007 BL SMEAR W/DIFF WBC COUNT: CPT

## 2025-03-01 RX ADMIN — SILVER NITRATE APPLICATORS 1 APPLICATION: 25; 75 STICK TOPICAL at 15:49
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
## 2025-03-03 ENCOUNTER — HOSPITAL ENCOUNTER (OUTPATIENT)
Dept: INFUSION THERAPY | Facility: HOSPITAL | Age: 85
Discharge: HOME | End: 2025-03-03
Admitting: INTERNAL MEDICINE
Payer: MEDICARE

## 2025-03-03 VITALS — OXYGEN SATURATION: 96 % | DIASTOLIC BLOOD PRESSURE: 43 MMHG | SYSTOLIC BLOOD PRESSURE: 93 MMHG | HEART RATE: 73 BPM

## 2025-03-03 DIAGNOSIS — K74.69: Primary | ICD-10-CM

## 2025-03-03 PROCEDURE — 25010000002 ALBUMIN HUMAN 25% PER 50 ML: Performed by: INTERNAL MEDICINE

## 2025-03-03 PROCEDURE — 96365 THER/PROPH/DIAG IV INF INIT: CPT

## 2025-03-03 PROCEDURE — P9047 ALBUMIN (HUMAN), 25%, 50ML: HCPCS | Performed by: INTERNAL MEDICINE

## 2025-03-03 RX ORDER — FUROSEMIDE 10 MG/ML
40 INJECTION INTRAMUSCULAR; INTRAVENOUS ONCE
Status: DISCONTINUED | OUTPATIENT
Start: 2025-03-03 | End: 2025-03-05 | Stop reason: HOSPADM

## 2025-03-03 RX ORDER — ALBUMIN (HUMAN) 12.5 G/50ML
25 SOLUTION INTRAVENOUS ONCE
Status: CANCELLED
Start: 2025-03-03 | End: 2025-03-03

## 2025-03-03 RX ORDER — ALBUMIN (HUMAN) 12.5 G/50ML
25 SOLUTION INTRAVENOUS ONCE
Status: COMPLETED | OUTPATIENT
Start: 2025-03-03 | End: 2025-03-03

## 2025-03-03 RX ORDER — FUROSEMIDE 10 MG/ML
40 INJECTION INTRAMUSCULAR; INTRAVENOUS ONCE
Status: CANCELLED
Start: 2025-03-03 | End: 2025-03-03

## 2025-03-03 RX ORDER — FUROSEMIDE 10 MG/ML
40 INJECTION INTRAMUSCULAR; INTRAVENOUS ONCE
Status: DISCONTINUED | OUTPATIENT
Start: 2025-03-03 | End: 2025-03-03

## 2025-03-03 RX ADMIN — ALBUMIN (HUMAN) 25 G: 0.25 INJECTION, SOLUTION INTRAVENOUS at 10:14

## 2025-03-04 ENCOUNTER — HOSPITAL ENCOUNTER (OUTPATIENT)
Dept: CARDIOLOGY | Facility: HOSPITAL | Age: 85
Discharge: HOME | End: 2025-03-04
Admitting: PHYSICIAN ASSISTANT
Payer: MEDICARE

## 2025-03-04 VITALS
BODY MASS INDEX: 29.2 KG/M2 | HEART RATE: 78 BPM | OXYGEN SATURATION: 100 % | SYSTOLIC BLOOD PRESSURE: 98 MMHG | WEIGHT: 204 LBS | HEIGHT: 70 IN | DIASTOLIC BLOOD PRESSURE: 57 MMHG

## 2025-03-04 DIAGNOSIS — I10 ESSENTIAL HYPERTENSION: Chronic | ICD-10-CM

## 2025-03-04 DIAGNOSIS — I48.21 PERMANENT ATRIAL FIBRILLATION: Chronic | ICD-10-CM

## 2025-03-04 DIAGNOSIS — E66.3 OVERWEIGHT: ICD-10-CM

## 2025-03-04 DIAGNOSIS — Z95.810 CARDIAC RESYNCHRONIZATION THERAPY DEFIBRILLATOR (CRT-D) IN PLACE: ICD-10-CM

## 2025-03-04 DIAGNOSIS — I50.22 CHRONIC SYSTOLIC CONGESTIVE HEART FAILURE: Primary | Chronic | ICD-10-CM

## 2025-03-04 LAB — ABSOLUTE LUNG FLUID CONTENT: 29 % (ref 20–35)

## 2025-03-04 PROCEDURE — 94726 PLETHYSMOGRAPHY LUNG VOLUMES: CPT | Performed by: PHYSICIAN ASSISTANT

## 2025-03-04 RX ORDER — BUMETANIDE 1 MG/1
TABLET ORAL
Start: 2025-03-04

## 2025-03-04 NOTE — PROGRESS NOTES
[facility]  SANDRO Enriquez Maria C., MD  [address] [place] [address]    Thank you for asking me to see Ezequiel Thompson for congestive heart failure.    HPI:     This is a 84 y.o. male with known past medical history of :    Mechanical mitral valve  HFrEF  Ischemic cardiomyopathy   History of CABG, [place], [place] (IDB)    Chronic systolic heart failure-EF 10 to 15% (IDB)    BiV ICD (BSC) - reportedly placed in [place], [place] (IDB)    Echocardiogram, 3/23/2021: EF 26-30%  TTE from 02/05/25 with EF 30%  CAD s/p CABG in [place], [place]  Permanent atrial fibrillation  CRT ICD in situ  Essential HTN  Hyperlipidemia  Hypothyroidism  CKD  Cirrhosis    Ezequiel Thompson presents for today for Heart Failure clinic evaluation.  The patient is typically seen by Tram Mcgee MD.  Patient's primary cardiologist is Dr. Estuardo Brothers.     Last known EF 30%.       03/04/2025 visit data/details regarding:   Dyspnea:  Improving Dyspnea with exertion  Lower extremity swelling:  Improving Swelling of bilateral lower extremities  Abdominal swelling: Denies  Home weight: Weight monitoring booklet provided during initial visit; has scale.   Home BP: BP monitoring booklet provided during initial visit; has BP cuff.    Home heart rate: HR monitoring booklet provided during initial visit  Daily activities of living: Performing on his own  Pillows/lying flat: Hospital bed   HF zone: Green  Mr. Thompson reports he feels the swelling in his legs has slightly improved and his dyspnea has also improved.        Review of Systems - Review of Systems   Constitutional: Negative for decreased appetite and diaphoresis.   HENT:  Negative for congestion and ear pain.    Cardiovascular:  Positive for dyspnea on exertion and leg swelling.   Respiratory:  Positive for shortness of breath.    Endocrine: Negative for cold intolerance and heat intolerance.   Musculoskeletal:  Negative for  arthritis and falls.   Gastrointestinal:  Negative for bloating and anorexia.   Genitourinary:  Negative for bladder incontinence and dysuria.   Psychiatric/Behavioral:  Negative for altered mental status and hallucinations.          All other systems were reviewed and were negative.    Patient Active Problem List   Diagnosis    Essential hypertension    Coronary artery disease involving native coronary artery of native heart with angina pectoris    Permanent atrial fibrillation    Chronic systolic congestive heart failure    Cellulitis of foot    PAD (peripheral artery disease)    Hyperlipidemia LDL goal <70    Mechanical mitral valve in situ    Cellulitis    Hepatic cirrhosis    Venous ulcer    Cardiac resynchronization therapy defibrillator (CRT-D) in place    Microcytic anemia    Iron deficiency anemia, unspecified    Malabsorption    Epistaxis    Portal cirrhosis       family history includes Emphysema in his father; Other in his brother; Stroke in his half-sister and mother.     reports that he has never smoked. He has never used smokeless tobacco. He reports current alcohol use. He reports that he does not use drugs.    Allergies   Allergen Reactions    Spironolactone Other (See Comments)     gynocomastia    Iodinated Contrast Media Hives    Iodine Hives    Levothyroxine Dizziness         Current Outpatient Medications:     allopurinol (ZYLOPRIM) 300 MG tablet, Take 1 tablet by mouth Daily., Disp: , Rfl:     bumetanide (BUMEX) 1 MG tablet, 2mg in the morning and 1 mg in the afternoon, Disp: , Rfl:     carvedilol (COREG) 6.25 MG tablet, Take 1 tablet by mouth 2 (Two) Times a Day With Meals., Disp: 180 tablet, Rfl: 3    empagliflozin (Jardiance) 10 MG tablet tablet, Take 1 tablet by mouth Daily., Disp: 90 tablet, Rfl: 3    eplerenone (INSPRA) 50 MG tablet, Take 1 tablet by mouth 2 (Two) Times a Day., Disp: 180 tablet, Rfl: 3    potassium chloride (MICRO-K) 10 MEQ CR capsule, Take 2 capsules by mouth 3 (Three)  Times a Day. 2AM At least 6 everyday, sometimes more if hand cramp/draw.  Might take 8  The most he would take is, Disp: , Rfl:     sacubitril-valsartan (Entresto) 24-26 MG tablet, Take 1 tablet by mouth 2 (Two) Times a Day., Disp: 180 tablet, Rfl: 1    warfarin (COUMADIN) 4 MG tablet, Take 1 tablet by mouth Daily. Mitral Valve  Dr. Mcgee  2.5-3.5, Disp: , Rfl:   No current facility-administered medications for this encounter.    Facility-Administered Medications Ordered in Other Encounters:     furosemide (LASIX) injection 40 mg, 40 mg, Intravenous, Once, Tram Mcgee MD      Physical Exam:  I have reviewed the patient's current vital signs as documented in the patient's EMR.   Vitals:    25 1011   BP: 98/57   Pulse: 78   SpO2: 100%       Body mass index is 29.27 kg/m².       25  1011   Weight: 92.5 kg (204 lb)        Physical Exam  Vitals and nursing note reviewed.   Constitutional:       General: He is awake.      Appearance: Normal appearance.   HENT:      Head: Normocephalic.      Nose: No congestion or rhinorrhea.   Eyes:      Extraocular Movements: Extraocular movements intact.      Pupils: Pupils are equal, round, and reactive to light.   Cardiovascular:      Rate and Rhythm: Normal rate and regular rhythm.      Comments: Mechanical valve click appreciated  Pulmonary:      Breath sounds: No stridor. No wheezing or rales.   Abdominal:      General: Bowel sounds are normal. There is no distension.   Musculoskeletal:         General: Swelling present.      Right lower le+ Edema present.      Left lower le+ Edema present.   Skin:     General: Skin is warm and dry.      Coloration: Skin is not jaundiced.   Neurological:      Mental Status: He is alert and oriented to person, place, and time.   Psychiatric:         Behavior: Behavior is cooperative.          JVP: Volume/Pulsation: Normal.        DATA REVIEWED:     ---------------------------------------------------  TTE/ROSA:  Results  "for orders placed during the hospital encounter of 02/05/25    Adult Transthoracic Echo Complete W/ Cont if Necessary Per Protocol    Interpretation Summary    Left ventricular systolic function is moderately decreased. Estimated left ventricular EF = 30%    The left atrial cavity is severely dilated.    There is calcification of the aortic valve.    There is a mechanical mitral valve prosthesis present.  The mechanical valve appears to function normally.    Moderate tricuspid valve regurgitation is present.    Estimated right ventricular systolic pressure from tricuspid regurgitation is mildly elevated (40 mmHg).        LAST HEART CATH RESULT/IF AVAILABLE:     No results found for this or any previous visit.      -----------------------------------------------------  CXR/Imaging:   Imaging Results (Most Recent)       None                -----------------------------------------------------  CT:   US Abdomen Complete    Result Date: 2/20/2025   1. Cholelithiasis 2. Dilated vascular structure in the liver 3. Somewhat heterogeneous echotexture of the liver   This report was finalized on 2/20/2025 1:05 PM by Dr. Tank Farnsworth MD.         ----------------------------------------------------      --------------------------------------------------------------------------------------------------    Laboratory evaluations:    Lab Results   Component Value Date    GLUCOSE 99 03/01/2025    BUN 29 (H) 03/01/2025    CREATININE 1.14 03/01/2025    EGFRIFNONA 50 (L) 02/01/2022    BCR 25.4 (H) 03/01/2025    K 4.5 03/01/2025    CO2 27.3 03/01/2025    CALCIUM 8.1 (L) 03/01/2025    ALBUMIN 2.9 (L) 03/01/2025    AST 18 03/01/2025    ALT <5 03/01/2025     Lab Results   Component Value Date    WBC 5.13 03/01/2025    HGB 12.5 (L) 03/01/2025    HCT 40.3 03/01/2025    MCV 94.2 03/01/2025     (L) 03/01/2025     No results found for: \"CHOL\", \"CHLPL\", \"TRIG\", \"HDL\", \"LDL\", \"LDLDIRECT\"  No results found for: \"TSH\", \"T0FSHIG\", \"U8EQGQL\", " "\"THYROIDAB\"  No results found for: \"HGBA1C\"  Lab Results   Component Value Date    ALT <5 03/01/2025     No results found for: \"HGBA1C\"  Lab Results   Component Value Date    CREATININE 1.14 03/01/2025     Lab Results   Component Value Date    IRON 16 (L) 09/20/2022    TIBC 527 09/20/2022    FERRITIN 10.10 (L) 09/20/2022     Lab Results   Component Value Date    INR 1.98 (H) 03/01/2025    INR 2.37 (H) 02/18/2025    INR 2.43 (H) 02/17/2025    PROTIME 22.8 (H) 03/01/2025    PROTIME 26.2 (H) 02/18/2025    PROTIME 26.7 (H) 02/17/2025        Lab Results   Component Value Date    ABSOLUTELUNG 29 03/04/2025    ABSOLUTELUNG 32 02/18/2025           1. Chronic systolic congestive heart failure    2. Permanent atrial fibrillation    3. Essential hypertension    4. Cardiac resynchronization therapy defibrillator (CRT-D) in place    5. Overweight            ORDERS PLACED TODAY:  Orders Placed This Encounter   Procedures    ReDs Vest        Diagnoses and all orders for this visit:    1. Chronic systolic congestive heart failure (Primary)  Overview:  Echocardiogram (7/10/2020): Severe LV dysfunction.  Normal functioning mechanical mitral valve.  Moderate to severe TR, mild pulmonary hypertension with RVSP 45 mmHg  Echocardiogram (3/23/2021): LVEF 26-30% with dilated LV.  Mechanical mitral valve with mild MR.  RVSP 45-55 mmHg  Intolerant to spironolactone  Echo (2/5/2025): LVEF 30%.  Dilated left atrium.  Normal functioning mechanical mitral valve.  Moderate TR.  RVSP 40 mmHg.  Intolerant to spironolactone (gynecomastia)    Orders:  -     ReDs Vest  -     bumetanide (BUMEX) 1 MG tablet; 2mg in the morning and 1 mg in the afternoon    2. Permanent atrial fibrillation  Overview:  [id number] 6 (age >75, CHF, CAD, HTN, CVA)      3. Essential hypertension  Overview:  Target blood pressure <130/80 mmHg  Renal duplex (2/1/2022):  Right kidney:  Unremarkable. Left kidney: 1.7 cm simple left renal cyst. No acute findings in the " retroperitoneum.      4. Cardiac resynchronization therapy defibrillator (CRT-D) in place  Overview:  [employer] ICD for primary prevention      5. Overweight               MEDS ORDERED TODAY:    New Medications Ordered This Visit   Medications    bumetanide (BUMEX) 1 MG tablet     Simg in the morning and 1 mg in the afternoon        ---------------------------------------------------------------------------------------------------------------------------          ASSESSMENT/PLAN:      Diagnosis Plan   1. Chronic systolic congestive heart failure  ReDs Vest    bumetanide (BUMEX) 1 MG tablet      2. Permanent atrial fibrillation        3. Essential hypertension        4. Cardiac resynchronization therapy defibrillator (CRT-D) in place        5. Overweight              not acutely decompensated chronic moderate systolic heart failure. CHF.     NYHA stage Stage C: Structural heart disease is present AND symptoms have occurredFC-Class II: Slight limitation of physical activity. Comfortable at rest Ordinary physical activity results in fatigue, palpitation, dyspnea (shortness of breath).     Today, Patient is mildly volume overloadedand with  Moderate perfusion. The patient's hemodynamics are currently acceptable. HR is: normal and is at goal. BP/MAP was reviewed and there is not room for medication up-titration.  Clinical trajectory was assessed and haswaxed and waned.     CHF GOAL DIRECTED MEDICAL THERAPY FOR PATIENT ADDRESSED/ADJUSTED:     GDMT: HFrEF    Drug Class   Drug   Dose Last Dose Adjustment Notes   ACEi/ARB/ARNI Entresto 24-26mg BID     Beta Blocker Coreg 6.25mg BID     MRA BP borderline      SGLT2i Jardiance 10mg  N/A   Secondaries if applicable:          -CHF Specific BB:    Carvedilol   6.25mg BID. We have provided patient with BP log and asked him to keep this for further monitoring to assist with titrations.          -ACE/ARB/ARNi:   Entresto on board; Coupon card obtained that brought  Entresto price down to $10 for patient with supply delivered by pharmacy prior to patient leaving clinic on initial visit.       -MRA:   BP too low to consider today.     -SGLT2 inhibitor therapy:   Recommend continuing Jardiance (empagliflozin) 10mg with quarterly assessment of GFR.  Pharmacy working on further assistance with SGTL2i therapy as copay cards with this only brought medication down to $140ish monthly.  Please see pharmacy note.   Pt was advised SEs, some severe, including hypersensitivity and Payal's; coupled with discussion regarding common side effects of UTIs and female genital mycotic infections were discussed. If you will be NPO, or are sick (poor PO intake, N&V) please hold the medication until you are back to a normal diet.     -Diuretic regimen:   ReDS Vest reading for. 03/04/25 is 29; ReDs Vest reading reviewed with patient.    Taking BUmex 2mg in the morning and 1mg in the afternoon; Continue current regimen with patient feeling improved and with ReDS test WNL.    03/01/25 labs reviewed.  Patient discussed with pharmacist.        -Fluid restriction/Sodium restriction:   Requested 2000 ml restriction  Patient has been asked to weigh daily and was provided with a printed diuretic strategy.  1,500 mg Na restriction was discussed.        -Acute and/or Chronic underlying conditions other than HF addressed during visit:   Permanent atrial fibrillation:   Mechanical valve mitral:   ASCVD s/p prior CABG:   Continue Coumadin.     Identifiable barriers to Heart Failure Self-care:   Medical Barriers: Frailty  Social Barriers: Financial [place] of HF treatment        BMI is >= 25 and <30. (Overweight) The following options were offered after discussion;: Heart failure dietary management            This document has been electronically signed by Liz Lazaro PA-C  March 4, 2025 12:19 EST      Dictated Utilizing Dragon Dictation: Part of this note may be an electronic transcription/translation  of spoken language to printed text using the Dragon Dictation System.    Follow-up appointment and medication changes provided in hand delivered After Visit Summary as well as reviewed in the room.
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
## 2025-03-04 NOTE — PROGRESS NOTES
Heart Failure Clinic    Date: 03/04/25     Vitals:    03/04/25 1011   BP: 98/57   Pulse: 78   SpO2: 100%    Weight 204    Method of arrival: Ambulatory    Weighing self daily: Yes    Monitoring Heart Failure Zones: No    Today's HF Zone: Green    Taking medications as prescribed: Yes    Edema No    Shortness of Air: Yes with activity    Number of pillows used at night:<2    Educational Materials given:  AVS                                                                         ReDS Value: 29  25-35 Optimal Value Status      Patti Franz MA 03/04/25 10:11 EST

## 2025-03-06 ENCOUNTER — TRANSCRIBE ORDERS (OUTPATIENT)
Dept: ADMINISTRATIVE | Facility: HOSPITAL | Age: 85
End: 2025-03-06
Payer: MEDICARE

## 2025-03-06 DIAGNOSIS — K74.69 FLORID CIRRHOSIS: Primary | ICD-10-CM

## 2025-03-10 ENCOUNTER — HOSPITAL ENCOUNTER (OUTPATIENT)
Dept: INFUSION THERAPY | Facility: HOSPITAL | Age: 85
Discharge: HOME | End: 2025-03-10
Admitting: INTERNAL MEDICINE
Payer: MEDICARE

## 2025-03-10 VITALS
OXYGEN SATURATION: 96 % | DIASTOLIC BLOOD PRESSURE: 54 MMHG | SYSTOLIC BLOOD PRESSURE: 106 MMHG | HEART RATE: 76 BPM | RESPIRATION RATE: 18 BRPM

## 2025-03-10 DIAGNOSIS — K74.69: Primary | ICD-10-CM

## 2025-03-10 PROCEDURE — 96365 THER/PROPH/DIAG IV INF INIT: CPT

## 2025-03-10 PROCEDURE — 96375 TX/PRO/DX INJ NEW DRUG ADDON: CPT

## 2025-03-10 PROCEDURE — P9047 ALBUMIN (HUMAN), 25%, 50ML: HCPCS | Performed by: INTERNAL MEDICINE

## 2025-03-10 PROCEDURE — 25010000002 FUROSEMIDE PER 20 MG: Performed by: INTERNAL MEDICINE

## 2025-03-10 PROCEDURE — 25010000002 ALBUMIN HUMAN 25% PER 50 ML: Performed by: INTERNAL MEDICINE

## 2025-03-10 PROCEDURE — 96374 THER/PROPH/DIAG INJ IV PUSH: CPT

## 2025-03-10 RX ORDER — FUROSEMIDE 10 MG/ML
40 INJECTION INTRAMUSCULAR; INTRAVENOUS ONCE
Status: CANCELLED
Start: 2025-03-10 | End: 2025-03-10

## 2025-03-10 RX ORDER — ALBUMIN (HUMAN) 12.5 G/50ML
25 SOLUTION INTRAVENOUS ONCE
Status: COMPLETED | OUTPATIENT
Start: 2025-03-10 | End: 2025-03-10

## 2025-03-10 RX ORDER — FUROSEMIDE 10 MG/ML
40 INJECTION INTRAMUSCULAR; INTRAVENOUS ONCE
Status: COMPLETED | OUTPATIENT
Start: 2025-03-10 | End: 2025-03-10

## 2025-03-10 RX ORDER — ALBUMIN (HUMAN) 12.5 G/50ML
25 SOLUTION INTRAVENOUS ONCE
Status: CANCELLED
Start: 2025-03-10 | End: 2025-03-10

## 2025-03-10 RX ADMIN — FUROSEMIDE 40 MG: 10 INJECTION, SOLUTION INTRAMUSCULAR; INTRAVENOUS at 11:03

## 2025-03-10 RX ADMIN — ALBUMIN (HUMAN) 25 G: 0.25 INJECTION, SOLUTION INTRAVENOUS at 10:30

## 2025-03-17 ENCOUNTER — HOSPITAL ENCOUNTER (OUTPATIENT)
Dept: INFUSION THERAPY | Facility: HOSPITAL | Age: 85
Discharge: HOME | End: 2025-03-17
Admitting: INTERNAL MEDICINE
Payer: MEDICARE

## 2025-03-17 VITALS — OXYGEN SATURATION: 98 % | DIASTOLIC BLOOD PRESSURE: 68 MMHG | HEART RATE: 77 BPM | SYSTOLIC BLOOD PRESSURE: 137 MMHG

## 2025-03-17 DIAGNOSIS — K74.69: Primary | ICD-10-CM

## 2025-03-17 PROCEDURE — 25010000002 FUROSEMIDE PER 20 MG: Performed by: INTERNAL MEDICINE

## 2025-03-17 PROCEDURE — 96375 TX/PRO/DX INJ NEW DRUG ADDON: CPT

## 2025-03-17 PROCEDURE — 96365 THER/PROPH/DIAG IV INF INIT: CPT

## 2025-03-17 PROCEDURE — P9047 ALBUMIN (HUMAN), 25%, 50ML: HCPCS | Performed by: INTERNAL MEDICINE

## 2025-03-17 PROCEDURE — 25010000002 ALBUMIN HUMAN 25% PER 50 ML: Performed by: INTERNAL MEDICINE

## 2025-03-17 RX ORDER — ALBUMIN (HUMAN) 12.5 G/50ML
25 SOLUTION INTRAVENOUS ONCE
Status: COMPLETED | OUTPATIENT
Start: 2025-03-17 | End: 2025-03-17

## 2025-03-17 RX ORDER — ALBUMIN (HUMAN) 12.5 G/50ML
25 SOLUTION INTRAVENOUS ONCE
Status: CANCELLED
Start: 2025-03-17 | End: 2025-03-17

## 2025-03-17 RX ORDER — FUROSEMIDE 10 MG/ML
40 INJECTION INTRAMUSCULAR; INTRAVENOUS ONCE
Status: CANCELLED
Start: 2025-03-17 | End: 2025-03-17

## 2025-03-17 RX ORDER — FUROSEMIDE 10 MG/ML
40 INJECTION INTRAMUSCULAR; INTRAVENOUS ONCE
Status: COMPLETED | OUTPATIENT
Start: 2025-03-17 | End: 2025-03-17

## 2025-03-17 RX ADMIN — ALBUMIN (HUMAN) 25 G: 0.25 INJECTION, SOLUTION INTRAVENOUS at 10:31

## 2025-03-17 RX ADMIN — FUROSEMIDE 40 MG: 10 INJECTION, SOLUTION INTRAMUSCULAR; INTRAVENOUS at 11:04

## 2025-03-21 ENCOUNTER — DISEASE STATE MANAGEMENT VISIT (OUTPATIENT)
Dept: CARDIOLOGY | Facility: HOSPITAL | Age: 85
End: 2025-03-21
Payer: MEDICARE

## 2025-03-24 ENCOUNTER — HOSPITAL ENCOUNTER (OUTPATIENT)
Dept: INFUSION THERAPY | Facility: HOSPITAL | Age: 85
Discharge: HOME | End: 2025-03-24
Admitting: INTERNAL MEDICINE
Payer: MEDICARE

## 2025-03-24 VITALS — OXYGEN SATURATION: 95 % | SYSTOLIC BLOOD PRESSURE: 109 MMHG | DIASTOLIC BLOOD PRESSURE: 56 MMHG | HEART RATE: 74 BPM

## 2025-03-24 DIAGNOSIS — K74.69: Primary | ICD-10-CM

## 2025-03-24 PROCEDURE — 25010000002 ALBUMIN HUMAN 25% PER 50 ML: Performed by: INTERNAL MEDICINE

## 2025-03-24 PROCEDURE — 25010000002 FUROSEMIDE PER 20 MG: Performed by: INTERNAL MEDICINE

## 2025-03-24 PROCEDURE — P9047 ALBUMIN (HUMAN), 25%, 50ML: HCPCS | Performed by: INTERNAL MEDICINE

## 2025-03-24 PROCEDURE — 96375 TX/PRO/DX INJ NEW DRUG ADDON: CPT

## 2025-03-24 PROCEDURE — 96365 THER/PROPH/DIAG IV INF INIT: CPT

## 2025-03-24 RX ORDER — FUROSEMIDE 10 MG/ML
40 INJECTION INTRAMUSCULAR; INTRAVENOUS ONCE
Status: COMPLETED | OUTPATIENT
Start: 2025-03-24 | End: 2025-03-24

## 2025-03-24 RX ORDER — FUROSEMIDE 10 MG/ML
40 INJECTION INTRAMUSCULAR; INTRAVENOUS ONCE
Status: CANCELLED
Start: 2025-03-24 | End: 2025-03-24

## 2025-03-24 RX ORDER — ALBUMIN (HUMAN) 12.5 G/50ML
25 SOLUTION INTRAVENOUS ONCE
Status: COMPLETED | OUTPATIENT
Start: 2025-03-24 | End: 2025-03-24

## 2025-03-24 RX ORDER — ALBUMIN (HUMAN) 12.5 G/50ML
25 SOLUTION INTRAVENOUS ONCE
Status: CANCELLED
Start: 2025-03-24 | End: 2025-03-24

## 2025-03-24 RX ADMIN — FUROSEMIDE 40 MG: 10 INJECTION, SOLUTION INTRAMUSCULAR; INTRAVENOUS at 10:41

## 2025-03-24 RX ADMIN — ALBUMIN (HUMAN) 25 G: 0.25 INJECTION, SOLUTION INTRAVENOUS at 10:09

## 2025-03-31 ENCOUNTER — HOSPITAL ENCOUNTER (OUTPATIENT)
Dept: INFUSION THERAPY | Facility: HOSPITAL | Age: 85
Discharge: HOME | End: 2025-03-31
Payer: MEDICARE

## 2025-03-31 VITALS — DIASTOLIC BLOOD PRESSURE: 46 MMHG | HEART RATE: 78 BPM | SYSTOLIC BLOOD PRESSURE: 90 MMHG | OXYGEN SATURATION: 98 %

## 2025-03-31 DIAGNOSIS — K74.69: Primary | ICD-10-CM

## 2025-03-31 PROCEDURE — 25010000002 ALBUMIN HUMAN 25% PER 50 ML: Performed by: INTERNAL MEDICINE

## 2025-03-31 PROCEDURE — P9047 ALBUMIN (HUMAN), 25%, 50ML: HCPCS | Performed by: INTERNAL MEDICINE

## 2025-03-31 PROCEDURE — 96365 THER/PROPH/DIAG IV INF INIT: CPT

## 2025-03-31 PROCEDURE — 96374 THER/PROPH/DIAG INJ IV PUSH: CPT

## 2025-03-31 RX ORDER — FUROSEMIDE 10 MG/ML
40 INJECTION INTRAMUSCULAR; INTRAVENOUS ONCE
Start: 2025-03-31 | End: 2025-03-31

## 2025-03-31 RX ORDER — ALBUMIN (HUMAN) 12.5 G/50ML
25 SOLUTION INTRAVENOUS ONCE
Status: COMPLETED | OUTPATIENT
Start: 2025-03-31 | End: 2025-03-31

## 2025-03-31 RX ORDER — FUROSEMIDE 10 MG/ML
40 INJECTION INTRAMUSCULAR; INTRAVENOUS ONCE
Status: DISCONTINUED | OUTPATIENT
Start: 2025-03-31 | End: 2025-04-02 | Stop reason: HOSPADM

## 2025-03-31 RX ORDER — ALBUMIN (HUMAN) 12.5 G/50ML
25 SOLUTION INTRAVENOUS ONCE
Start: 2025-03-31 | End: 2025-03-31

## 2025-03-31 RX ADMIN — ALBUMIN (HUMAN) 25 G: 0.25 INJECTION, SOLUTION INTRAVENOUS at 10:36

## 2025-04-18 ENCOUNTER — DISEASE STATE MANAGEMENT VISIT (OUTPATIENT)
Dept: PHARMACY | Facility: HOSPITAL | Age: 85
End: 2025-04-18
Payer: MEDICARE

## 2025-05-08 ENCOUNTER — HOSPITAL ENCOUNTER (OUTPATIENT)
Dept: CARDIOLOGY | Facility: HOSPITAL | Age: 85
Discharge: HOME | End: 2025-05-08
Payer: MEDICARE

## 2025-05-08 VITALS
DIASTOLIC BLOOD PRESSURE: 60 MMHG | SYSTOLIC BLOOD PRESSURE: 112 MMHG | OXYGEN SATURATION: 99 % | HEART RATE: 80 BPM | BODY MASS INDEX: 30.91 KG/M2 | WEIGHT: 215.4 LBS

## 2025-05-08 DIAGNOSIS — Z95.810 CARDIAC RESYNCHRONIZATION THERAPY DEFIBRILLATOR (CRT-D) IN PLACE: ICD-10-CM

## 2025-05-08 DIAGNOSIS — K74.69: ICD-10-CM

## 2025-05-08 DIAGNOSIS — I50.22 CHRONIC HFREF (HEART FAILURE WITH REDUCED EJECTION FRACTION): ICD-10-CM

## 2025-05-08 DIAGNOSIS — I50.22 CHRONIC SYSTOLIC CONGESTIVE HEART FAILURE: Primary | Chronic | ICD-10-CM

## 2025-05-08 DIAGNOSIS — D50.9 IRON DEFICIENCY ANEMIA, UNSPECIFIED IRON DEFICIENCY ANEMIA TYPE: ICD-10-CM

## 2025-05-08 DIAGNOSIS — I48.21 PERMANENT ATRIAL FIBRILLATION: Chronic | ICD-10-CM

## 2025-05-08 LAB
ABSOLUTE LUNG FLUID CONTENT: 34 % (ref 20–35)
ALBUMIN SERPL-MCNC: 3.2 G/DL (ref 3.5–5.2)
ALBUMIN/GLOB SERPL: 1.4 G/DL
ALP SERPL-CCNC: 84 U/L (ref 39–117)
ALT SERPL W P-5'-P-CCNC: 10 U/L (ref 1–41)
ANION GAP SERPL CALCULATED.3IONS-SCNC: 8.3 MMOL/L (ref 5–15)
AST SERPL-CCNC: 18 U/L (ref 1–40)
BILIRUB SERPL-MCNC: 0.9 MG/DL (ref 0–1.2)
BUN SERPL-MCNC: 27 MG/DL (ref 8–23)
BUN/CREAT SERPL: 22.9 (ref 7–25)
CALCIUM SPEC-SCNC: 8 MG/DL (ref 8.6–10.5)
CHLORIDE SERPL-SCNC: 103 MMOL/L (ref 98–107)
CO2 SERPL-SCNC: 25.7 MMOL/L (ref 22–29)
CREAT SERPL-MCNC: 1.18 MG/DL (ref 0.76–1.27)
EGFRCR SERPLBLD CKD-EPI 2021: 61.2 ML/MIN/1.73
FERRITIN SERPL-MCNC: 38.84 NG/ML (ref 30–400)
GLOBULIN UR ELPH-MCNC: 2.3 GM/DL
GLUCOSE SERPL-MCNC: 100 MG/DL (ref 65–99)
IRON 24H UR-MRATE: 63 MCG/DL (ref 59–158)
IRON SATN MFR SERPL: 15 % (ref 20–50)
MAGNESIUM SERPL-MCNC: 2.2 MG/DL (ref 1.6–2.4)
NT-PROBNP SERPL-MCNC: 539.1 PG/ML (ref 0–1800)
POTASSIUM SERPL-SCNC: 4.3 MMOL/L (ref 3.5–5.2)
PROT SERPL-MCNC: 5.5 G/DL (ref 6–8.5)
SODIUM SERPL-SCNC: 137 MMOL/L (ref 136–145)
TIBC SERPL-MCNC: 425 MCG/DL (ref 298–536)
TRANSFERRIN SERPL-MCNC: 285 MG/DL (ref 200–360)

## 2025-05-08 PROCEDURE — 80053 COMPREHEN METABOLIC PANEL: CPT | Performed by: PHYSICIAN ASSISTANT

## 2025-05-08 PROCEDURE — 94726 PLETHYSMOGRAPHY LUNG VOLUMES: CPT | Performed by: PHYSICIAN ASSISTANT

## 2025-05-08 PROCEDURE — 82728 ASSAY OF FERRITIN: CPT | Performed by: PHYSICIAN ASSISTANT

## 2025-05-08 PROCEDURE — 84466 ASSAY OF TRANSFERRIN: CPT | Performed by: PHYSICIAN ASSISTANT

## 2025-05-08 PROCEDURE — 83735 ASSAY OF MAGNESIUM: CPT | Performed by: PHYSICIAN ASSISTANT

## 2025-05-08 PROCEDURE — 83880 ASSAY OF NATRIURETIC PEPTIDE: CPT | Performed by: PHYSICIAN ASSISTANT

## 2025-05-08 PROCEDURE — 36415 COLL VENOUS BLD VENIPUNCTURE: CPT | Performed by: PHYSICIAN ASSISTANT

## 2025-05-08 PROCEDURE — 83540 ASSAY OF IRON: CPT | Performed by: PHYSICIAN ASSISTANT

## 2025-05-08 RX ORDER — FERROUS SULFATE 325(65) MG
1 TABLET ORAL DAILY
COMMUNITY
Start: 2025-04-11

## 2025-05-08 NOTE — PROGRESS NOTES
[facility] Heart Failure Clinic  SANDRO Enriquez Maria C., MD  [address] [place] [address]    Thank you for asking me to see Ezequiel Thompson for congestive heart failure.    HPI:     This is a 84 y.o. male with known past medical history of :    Mechanical mitral valve  HFrEF  Ischemic cardiomyopathy   History of CABG, [place], [place] (IDB)    Chronic systolic heart failure-EF 10 to 15% (IDB)    BiV ICD (BSC) - reportedly placed in [place], [place] (IDB)    Echocardiogram, 3/23/2021: EF 26-30%  TTE from 02/05/25 with EF 30%  CAD s/p CABG in [place], [place]  Permanent atrial fibrillation  CRT ICD in situ  Hx pleural effusion with what sounds like PleurX catheter several years ago per patient account  Essential HTN  Hyperlipidemia  Hypothyroidism  CKD  Cirrhosis  History of known Iron Deficiency    Ezequiel Thompson presents for today for Heart Failure clinic evaluation.  The patient is typically seen by Tram Mcgee MD.  Patient's primary cardiologist is Dr. Estuardo Brothers.     Last known EF 30%.       05/08/2025 visit data/details regarding:   Dyspnea:  Dyspnea with exertion  Lower extremity swelling:  Some swelling of lower extremities present  Abdominal swelling: Denies  Home weight: Weight monitoring booklet provided during initial visit; has scale.   Home BP: BP monitoring booklet provided during initial visit; has BP cuff.  SBP ranging from  per review of log book.    Home heart rate: HR monitoring booklet provided during initial visit  Daily activities of living: Performing on his own  Pillows/lying flat: Hospital bed   HF zone: Green  Mr. Thompson is chest pain free. He does have some dyspnea on exertion and some swelling of his lower extremities.  He reports he is in the process of being set up for albumin and IV lasix with infusion clinic.   Since his last visit, Mr. Thompson was evaluated by ENT for recurrent nosebleeds with some telangectasias observed and  cauterization performed per review of note in chart.  He reports he has not had further bleeds since the cauterization.   He reports he has been taking OTC tablets that dissolve under his tongue for leg cramps and he reports these seem to help.        Review of Systems - Review of Systems   Constitutional: Negative for decreased appetite and diaphoresis.   HENT:  Negative for congestion and ear pain.    Cardiovascular:  Positive for dyspnea on exertion and leg swelling.   Respiratory:  Positive for shortness of breath.    Endocrine: Negative for cold intolerance and heat intolerance.   Musculoskeletal:  Negative for arthritis and falls.   Gastrointestinal:  Negative for bloating and anorexia.   Genitourinary:  Negative for bladder incontinence and dysuria.   Psychiatric/Behavioral:  Negative for altered mental status and hallucinations.          All other systems were reviewed and were negative.    Patient Active Problem List   Diagnosis    Essential hypertension    Coronary artery disease involving native coronary artery of native heart with angina pectoris    Permanent atrial fibrillation    Chronic systolic congestive heart failure    Cellulitis of foot    PAD (peripheral artery disease)    Hyperlipidemia LDL goal <70    Mechanical mitral valve in situ    Cellulitis    Hepatic cirrhosis    Venous ulcer    Cardiac resynchronization therapy defibrillator (CRT-D) in place    Microcytic anemia    Iron deficiency anemia, unspecified    Malabsorption    Epistaxis    Portal cirrhosis       family history includes Emphysema in his father; Other in his brother; Stroke in his half-sister and mother.     reports that he has never smoked. He has never used smokeless tobacco. He reports current alcohol use. He reports that he does not use drugs.    Allergies   Allergen Reactions    Spironolactone Other (See Comments)     gynocomastia    Iodinated Contrast Media Hives    Iodine Hives    Levothyroxine Dizziness         Current  Outpatient Medications:     allopurinol (ZYLOPRIM) 300 MG tablet, Take 0.5 tablets by mouth Daily., Disp: , Rfl:     bumetanide (BUMEX) 1 MG tablet, 2mg in the morning and 1 mg in the afternoon, Disp: , Rfl:     carvedilol (COREG) 6.25 MG tablet, Take 1 tablet by mouth 2 (Two) Times a Day With Meals., Disp: 180 tablet, Rfl: 3    empagliflozin (Jardiance) 10 MG tablet tablet, Take 1 tablet by mouth Daily., Disp: 90 tablet, Rfl: 3    eplerenone (INSPRA) 50 MG tablet, Take 1 tablet by mouth 2 (Two) Times a Day. (Patient taking differently: Take 1 tablet by mouth Daily.), Disp: 180 tablet, Rfl: 3    FeroSul 325 (65 Fe) MG tablet, Take 1 tablet by mouth Daily., Disp: , Rfl:     potassium chloride (MICRO-K) 10 MEQ CR capsule, Take 2 capsules by mouth 3 (Three) Times a Day. 2AM At least 6 everyday, sometimes more if hand cramp/draw.  Might take 8  The most he would take is, Disp: , Rfl:     sacubitril-valsartan (Entresto) 24-26 MG tablet, Take 1 tablet by mouth 2 (Two) Times a Day., Disp: 180 tablet, Rfl: 1    warfarin (COUMADIN) 4 MG tablet, Take 1 tablet by mouth Daily. Mitral Valve  Dr. Mcgee  2.5-3.5, Disp: , Rfl:       Physical Exam:  I have reviewed the patient's current vital signs as documented in the patient's EMR.   Vitals:    05/08/25 0932   BP: 112/60   Pulse: 80   SpO2: 99%         Body mass index is 30.91 kg/m².       05/08/25  0932   Weight: 97.7 kg (215 lb 6.4 oz)          Physical Exam  Vitals and nursing note reviewed.   Constitutional:       General: He is awake.      Appearance: Normal appearance.   HENT:      Head: Normocephalic.      Nose: No congestion or rhinorrhea.   Eyes:      Extraocular Movements: Extraocular movements intact.      Pupils: Pupils are equal, round, and reactive to light.   Cardiovascular:      Rate and Rhythm: Normal rate and regular rhythm.      Comments: Mechanical valve click appreciated  Pulmonary:      Breath sounds: No stridor. No wheezing or rales.   Abdominal:       General: Bowel sounds are normal. There is no distension.   Musculoskeletal:         General: Swelling present.      Right lower le+ Edema present.      Left lower le+ Edema present.   Skin:     General: Skin is warm and dry.      Coloration: Skin is not jaundiced.   Neurological:      Mental Status: He is alert and oriented to person, place, and time.   Psychiatric:         Behavior: Behavior is cooperative.          JVP: Volume/Pulsation: Normal.        DATA REVIEWED:     ---------------------------------------------------  TTE/ROSA:  Results for orders placed during the hospital encounter of 25    Adult Transthoracic Echo Complete W/ Cont if Necessary Per Protocol    Interpretation Summary    Left ventricular systolic function is moderately decreased. Estimated left ventricular EF = 30%    The left atrial cavity is severely dilated.    There is calcification of the aortic valve.    There is a mechanical mitral valve prosthesis present.  The mechanical valve appears to function normally.    Moderate tricuspid valve regurgitation is present.    Estimated right ventricular systolic pressure from tricuspid regurgitation is mildly elevated (40 mmHg).        LAST HEART CATH RESULT/IF AVAILABLE:     No results found for this or any previous visit.      -----------------------------------------------------  CXR/Imaging:   Imaging Results (Most Recent)       None                -----------------------------------------------------  CT:   No radiology results for the last 30 days.      ----------------------------------------------------      --------------------------------------------------------------------------------------------------    Laboratory evaluations:    Lab Results   Component Value Date    GLUCOSE 100 (H) 2025    BUN 27 (H) 2025    CREATININE 1.18 2025    EGFRIFNONA 50 (L) 2022    BCR 22.9 2025    K 4.3 2025    CO2 25.7 2025    CALCIUM 8.0 (L)  "05/08/2025    ALBUMIN 3.2 (L) 05/08/2025    AST 18 05/08/2025    ALT 10 05/08/2025     Lab Results   Component Value Date    WBC 5.13 03/01/2025    HGB 12.5 (L) 03/01/2025    HCT 40.3 03/01/2025    MCV 94.2 03/01/2025     (L) 03/01/2025     No results found for: \"CHOL\", \"CHLPL\", \"TRIG\", \"HDL\", \"LDL\", \"LDLDIRECT\"  No results found for: \"TSH\", \"I0YUHGS\", \"T6DNALB\", \"THYROIDAB\"  No results found for: \"HGBA1C\"  Lab Results   Component Value Date    ALT 10 05/08/2025     No results found for: \"HGBA1C\"  Lab Results   Component Value Date    CREATININE 1.18 05/08/2025     Lab Results   Component Value Date    IRON 63 05/08/2025    TIBC 425 05/08/2025    FERRITIN 38.84 05/08/2025     Lab Results   Component Value Date    INR 1.98 (H) 03/01/2025    INR 2.37 (H) 02/18/2025    INR 2.43 (H) 02/17/2025    PROTIME 22.8 (H) 03/01/2025    PROTIME 26.2 (H) 02/18/2025    PROTIME 26.7 (H) 02/17/2025        Lab Results   Component Value Date    ABSOLUTELUNG 34 05/08/2025    ABSOLUTELUNG 29 03/04/2025    ABSOLUTELUNG 32 02/18/2025           1. Chronic systolic congestive heart failure    2. Chronic HFrEF (heart failure with reduced ejection fraction)    3. Cardiac resynchronization therapy defibrillator (CRT-D) in place    4. Permanent atrial fibrillation    5. Portal cirrhosis    6. Iron deficiency anemia, unspecified iron deficiency anemia type            ORDERS PLACED TODAY:  Orders Placed This Encounter   Procedures    ReDs Vest    Magnesium    proBNP    Comprehensive Metabolic Panel    Iron Profile    Ferritin    Magnesium    proBNP    Comprehensive Metabolic Panel    Iron Profile    Ferritin        Diagnoses and all orders for this visit:    1. Chronic systolic congestive heart failure (Primary)  Overview:  Echocardiogram (7/10/2020): Severe LV dysfunction.  Normal functioning mechanical mitral valve.  Moderate to severe TR, mild pulmonary hypertension with RVSP 45 mmHg  Echocardiogram (3/23/2021): LVEF 26-30% with dilated " LV.  Mechanical mitral valve with mild MR.  RVSP 45-55 mmHg  Intolerant to spironolactone  Echo (2/5/2025): LVEF 30%.  Dilated left atrium.  Normal functioning mechanical mitral valve.  Moderate TR.  RVSP 40 mmHg.  Intolerant to spironolactone (gynecomastia)    Orders:  -     Magnesium; Future  -     proBNP; Future  -     Comprehensive Metabolic Panel; Future  -     Iron Profile; Future  -     Ferritin; Future  -     Magnesium; Standing  -     Magnesium  -     proBNP; Standing  -     proBNP  -     Comprehensive Metabolic Panel; Standing  -     Comprehensive Metabolic Panel  -     Iron Profile; Standing  -     Iron Profile  -     Ferritin; Standing  -     Ferritin  -     ReDs Vest    2. Chronic HFrEF (heart failure with reduced ejection fraction)    3. Cardiac resynchronization therapy defibrillator (CRT-D) in place  Overview:  [employer] ICD for primary prevention      4. Permanent atrial fibrillation  Overview:  [id number] 6 (age >75, CHF, CAD, HTN, CVA)      5. Portal cirrhosis  -     Comprehensive Metabolic Panel; Future  -     Comprehensive Metabolic Panel; Standing  -     Comprehensive Metabolic Panel    6. Iron deficiency anemia, unspecified iron deficiency anemia type  -     Iron Profile; Future  -     Ferritin; Future  -     Iron Profile; Standing  -     Iron Profile  -     Ferritin; Standing  -     Ferritin               MEDS ORDERED TODAY:    No orders of the defined types were placed in this encounter.       ---------------------------------------------------------------------------------------------------------------------------          ASSESSMENT/PLAN:      Diagnosis Plan   1. Chronic systolic congestive heart failure  Magnesium    proBNP    Comprehensive Metabolic Panel    Iron Profile    Ferritin    Magnesium    Magnesium    proBNP    proBNP    Comprehensive Metabolic Panel    Comprehensive Metabolic Panel    Iron Profile    Iron Profile    Ferritin    Ferritin    ReDs Vest      2. Chronic  HFrEF (heart failure with reduced ejection fraction)        3. Cardiac resynchronization therapy defibrillator (CRT-D) in place        4. Permanent atrial fibrillation        5. Portal cirrhosis  Comprehensive Metabolic Panel    Comprehensive Metabolic Panel    Comprehensive Metabolic Panel      6. Iron deficiency anemia, unspecified iron deficiency anemia type  Iron Profile    Ferritin    Iron Profile    Iron Profile    Ferritin    Ferritin            not acutely decompensated chronic moderate systolic heart failure. CHF.     NYHA stage Stage C: Structural heart disease is present AND symptoms have occurredFC-Class II: Slight limitation of physical activity. Comfortable at rest Ordinary physical activity results in fatigue, palpitation, dyspnea (shortness of breath).     Today, Patient is mildly volume overloadedand with  Excellent perfusion. The patient's hemodynamics are currently acceptable. HR is: normal and is at goal. BP/MAP was reviewed and there is not room for medication up-titration.  Clinical trajectory was assessed and hasimproved.     CHF GOAL DIRECTED MEDICAL THERAPY FOR PATIENT ADDRESSED/ADJUSTED:     GDMT: HFrEF    Drug Class   Drug   Dose Last Dose Adjustment Notes   ACEi/ARB/ARNI Entresto 24-26mg BID     Beta Blocker Coreg 6.25mg BID     MRA Eplerenone 50mg qd     SGLT2i Jardiance 10mg  N/A   Secondaries if applicable:          -CHF Specific BB:    Carvedilol   6.25mg BID.  BP log previously provided for patient to keep.  He brings in with him on today's visits with BPs well controlled.       -ACE/ARB/ARNi:   Entresto 24-26mg BID continued with patient tolerating.      -MRA:   Continue Eplerenone 50mg qd.  Patient is on potassium supplementation with potassium remaining WNL per BMP on 05/08/25.   Prior gynecomastia with Spironolactone.      -SGLT2 inhibitor therapy:   Recommend continuing Jardiance (empagliflozin) 10mg with quarterly assessment of GFR.  Pharmacy helped with medication assistance  with patient now receiving medication mailed to his home affordably.    Pt was advised SEs, some severe, including hypersensitivity and Payal's; coupled with discussion regarding common side effects of UTIs and female genital mycotic infections were discussed. If you will be NPO, or are sick (poor PO intake, N&V) please hold the medication until you are back to a normal diet.     -Diuretic regimen:   ReDS Vest reading for. 05/08/25 is 34; ReDs Vest reading reviewed with patient.    Taking Bumex 2mg in the morning and 1mg in the afternoon; Continue current regimen with patient feeling improved and with ReDS test WNL.    He reports upcoming appointment for IV albumin & IV lasix with infusion clinic.    ProBNP, CMP, & Mag obtained and reviewed. Creatinine remains WNL.   ProBNP WNL.      -Fluid restriction/Sodium restriction:   Requested 2000 ml restriction  Patient has been asked to weigh daily and was provided with a printed diuretic strategy.  1,500 mg Na restriction was discussed.        -Acute and/or Chronic underlying conditions other than HF addressed during visit:   Permanent atrial fibrillation:   Mechanical valve mitral:   ASCVD s/p prior CABG:   Continue Coumadin with mechanical valve (managed per Dr. Mcgee's office.)   Continue f/u with Dr. Brothers.      Iron Deficiency anemia:   On oral iron therapy at present. Reports he is compliant with this.   TSAT is less than 15.    Discussed iron deficiency being common in heart failure with iron deficiency negatively impacting functional capacity, quality of life, and life expectancy in patients with heart failure (HF). A serum ferritin <41 ng/mL or a TSAT <20 percent is strongly suggestive of iron deficiency in patients with HF.  Discussed benefits of possible iron infusions.  At present, Mr. Thompson wishes to continue his oral iron therapy and f/u with PCP.        Portal Cirrhosis:   CMP reviewed.   Continue f/u with PCP.     Identifiable barriers to Heart  Failure Self-care:   Medical Barriers: Frailty  Social Barriers: Financial [place] of HF treatment              This document has been electronically signed by Liz Lazaro PA-C  May 8, 2025 10:30 EDT      Dictated Utilizing Dragon Dictation: Part of this note may be an electronic transcription/translation of spoken language to printed text using the Dragon Dictation System.    Follow-up appointment and medication changes provided in hand delivered After Visit Summary as well as reviewed in the room.

## 2025-05-08 NOTE — PROGRESS NOTES
Heart Failure Clinic    Date: 05/08/25     Vitals:    05/08/25 0932   BP: 112/60   Pulse: 80   SpO2: 99%    Weight: 215.4lbs    Method of arrival: Ambulatory    Weighing self daily: Yes    Monitoring Heart Failure Zones: Yes    Today's HF Zone: Yellow     Taking medications as prescribed: Yes    Edema Yes    Shortness of Air: Yes    Number of pillows used at night:<2    Educational Materials given:  AVS Given                                                                         ReDS Value: 34  25-35 Optimal Value Status      Colleen Crawford RN 05/08/25 09:34 EDT
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
## 2025-05-12 ENCOUNTER — HOSPITAL ENCOUNTER (OUTPATIENT)
Dept: INFUSION THERAPY | Facility: HOSPITAL | Age: 85
Discharge: HOME | End: 2025-05-12
Admitting: INTERNAL MEDICINE
Payer: MEDICARE

## 2025-05-12 VITALS
TEMPERATURE: 98.3 F | RESPIRATION RATE: 16 BRPM | HEART RATE: 74 BPM | SYSTOLIC BLOOD PRESSURE: 95 MMHG | DIASTOLIC BLOOD PRESSURE: 46 MMHG | OXYGEN SATURATION: 97 %

## 2025-05-12 DIAGNOSIS — K74.69: Primary | ICD-10-CM

## 2025-05-12 PROCEDURE — 25010000002 ALBUMIN HUMAN 25% PER 50 ML: Performed by: INTERNAL MEDICINE

## 2025-05-12 PROCEDURE — 96365 THER/PROPH/DIAG IV INF INIT: CPT

## 2025-05-12 PROCEDURE — P9047 ALBUMIN (HUMAN), 25%, 50ML: HCPCS | Performed by: INTERNAL MEDICINE

## 2025-05-12 RX ORDER — ALBUMIN (HUMAN) 12.5 G/50ML
25 SOLUTION INTRAVENOUS ONCE
Status: COMPLETED | OUTPATIENT
Start: 2025-05-12 | End: 2025-05-12

## 2025-05-12 RX ORDER — FUROSEMIDE 10 MG/ML
40 INJECTION INTRAMUSCULAR; INTRAVENOUS ONCE
Status: CANCELLED
Start: 2025-05-12 | End: 2025-05-12

## 2025-05-12 RX ORDER — FUROSEMIDE 10 MG/ML
40 INJECTION INTRAMUSCULAR; INTRAVENOUS ONCE
Status: DISCONTINUED | OUTPATIENT
Start: 2025-05-12 | End: 2025-05-14 | Stop reason: HOSPADM

## 2025-05-12 RX ORDER — ALBUMIN (HUMAN) 12.5 G/50ML
25 SOLUTION INTRAVENOUS ONCE
Status: CANCELLED
Start: 2025-05-12 | End: 2025-05-12

## 2025-05-12 RX ADMIN — ALBUMIN (HUMAN) 25 G: 0.25 INJECTION, SOLUTION INTRAVENOUS at 09:18

## 2025-05-12 NOTE — CODE DOCUMENTATION
Holding patient's Lasix IV push due to blood pressure of 95/46. Spoke with Dr. Mcgee via telephone. Patient made aware and verbalizes understanding.

## 2025-05-16 ENCOUNTER — DISEASE STATE MANAGEMENT VISIT (OUTPATIENT)
Dept: PHARMACY | Facility: HOSPITAL | Age: 85
End: 2025-05-16
Payer: MEDICARE

## 2025-05-19 ENCOUNTER — HOSPITAL ENCOUNTER (OUTPATIENT)
Dept: INFUSION THERAPY | Facility: HOSPITAL | Age: 85
Discharge: HOME | End: 2025-05-19
Admitting: INTERNAL MEDICINE
Payer: MEDICARE

## 2025-05-19 VITALS
SYSTOLIC BLOOD PRESSURE: 101 MMHG | RESPIRATION RATE: 18 BRPM | TEMPERATURE: 98.2 F | OXYGEN SATURATION: 99 % | HEART RATE: 75 BPM | DIASTOLIC BLOOD PRESSURE: 51 MMHG

## 2025-05-19 DIAGNOSIS — K74.69: Primary | ICD-10-CM

## 2025-05-19 PROCEDURE — 96365 THER/PROPH/DIAG IV INF INIT: CPT

## 2025-05-19 PROCEDURE — 25010000002 ALBUMIN HUMAN 25% PER 50 ML: Performed by: INTERNAL MEDICINE

## 2025-05-19 PROCEDURE — 25010000002 FUROSEMIDE PER 20 MG: Performed by: INTERNAL MEDICINE

## 2025-05-19 PROCEDURE — P9047 ALBUMIN (HUMAN), 25%, 50ML: HCPCS | Performed by: INTERNAL MEDICINE

## 2025-05-19 PROCEDURE — 96375 TX/PRO/DX INJ NEW DRUG ADDON: CPT

## 2025-05-19 RX ORDER — FUROSEMIDE 10 MG/ML
40 INJECTION INTRAMUSCULAR; INTRAVENOUS ONCE
Start: 2025-05-19 | End: 2025-05-19

## 2025-05-19 RX ORDER — ALBUMIN (HUMAN) 12.5 G/50ML
25 SOLUTION INTRAVENOUS ONCE
Status: COMPLETED | OUTPATIENT
Start: 2025-05-19 | End: 2025-05-19

## 2025-05-19 RX ORDER — FUROSEMIDE 10 MG/ML
40 INJECTION INTRAMUSCULAR; INTRAVENOUS ONCE
Status: COMPLETED | OUTPATIENT
Start: 2025-05-19 | End: 2025-05-19

## 2025-05-19 RX ORDER — ALBUMIN (HUMAN) 12.5 G/50ML
25 SOLUTION INTRAVENOUS ONCE
Status: CANCELLED
Start: 2025-05-19 | End: 2025-05-19

## 2025-05-19 RX ORDER — ALBUMIN (HUMAN) 12.5 G/50ML
25 SOLUTION INTRAVENOUS ONCE
Start: 2025-05-19 | End: 2025-05-19

## 2025-05-19 RX ADMIN — ALBUMIN (HUMAN) 25 G: 0.25 INJECTION, SOLUTION INTRAVENOUS at 09:42

## 2025-05-19 RX ADMIN — FUROSEMIDE 40 MG: 10 INJECTION, SOLUTION INTRAMUSCULAR; INTRAVENOUS at 10:42

## 2025-05-27 ENCOUNTER — HOSPITAL ENCOUNTER (OUTPATIENT)
Dept: INFUSION THERAPY | Facility: HOSPITAL | Age: 85
Discharge: HOME | End: 2025-05-27
Admitting: INTERNAL MEDICINE
Payer: MEDICARE

## 2025-05-27 VITALS
DIASTOLIC BLOOD PRESSURE: 57 MMHG | SYSTOLIC BLOOD PRESSURE: 129 MMHG | HEART RATE: 77 BPM | TEMPERATURE: 98.3 F | RESPIRATION RATE: 18 BRPM | OXYGEN SATURATION: 98 %

## 2025-05-27 DIAGNOSIS — K74.69: Primary | ICD-10-CM

## 2025-05-27 PROCEDURE — 96374 THER/PROPH/DIAG INJ IV PUSH: CPT

## 2025-05-27 PROCEDURE — 96375 TX/PRO/DX INJ NEW DRUG ADDON: CPT

## 2025-05-27 PROCEDURE — 96365 THER/PROPH/DIAG IV INF INIT: CPT

## 2025-05-27 PROCEDURE — 25010000002 FUROSEMIDE PER 20 MG: Performed by: INTERNAL MEDICINE

## 2025-05-27 PROCEDURE — 25010000002 ALBUMIN HUMAN 25% PER 50 ML: Performed by: INTERNAL MEDICINE

## 2025-05-27 PROCEDURE — P9047 ALBUMIN (HUMAN), 25%, 50ML: HCPCS | Performed by: INTERNAL MEDICINE

## 2025-05-27 RX ORDER — ALBUMIN (HUMAN) 12.5 G/50ML
25 SOLUTION INTRAVENOUS ONCE
Status: CANCELLED
Start: 2025-05-27 | End: 2025-05-27

## 2025-05-27 RX ORDER — ALBUMIN (HUMAN) 12.5 G/50ML
25 SOLUTION INTRAVENOUS ONCE
Status: COMPLETED | OUTPATIENT
Start: 2025-05-27 | End: 2025-05-27

## 2025-05-27 RX ORDER — FUROSEMIDE 10 MG/ML
40 INJECTION INTRAMUSCULAR; INTRAVENOUS ONCE
Status: CANCELLED
Start: 2025-05-27 | End: 2025-05-27

## 2025-05-27 RX ORDER — FUROSEMIDE 10 MG/ML
40 INJECTION INTRAMUSCULAR; INTRAVENOUS ONCE
Status: COMPLETED | OUTPATIENT
Start: 2025-05-27 | End: 2025-05-27

## 2025-05-27 RX ADMIN — FUROSEMIDE 40 MG: 10 INJECTION, SOLUTION INTRAMUSCULAR; INTRAVENOUS at 10:12

## 2025-05-27 RX ADMIN — ALBUMIN (HUMAN) 25 G: 0.25 INJECTION, SOLUTION INTRAVENOUS at 09:36

## 2025-06-02 ENCOUNTER — HOSPITAL ENCOUNTER (OUTPATIENT)
Dept: INFUSION THERAPY | Facility: HOSPITAL | Age: 85
Discharge: HOME | End: 2025-06-02
Admitting: INTERNAL MEDICINE
Payer: MEDICARE

## 2025-06-02 VITALS
OXYGEN SATURATION: 99 % | SYSTOLIC BLOOD PRESSURE: 109 MMHG | TEMPERATURE: 98.2 F | DIASTOLIC BLOOD PRESSURE: 55 MMHG | HEART RATE: 75 BPM

## 2025-06-02 DIAGNOSIS — K74.69: Primary | ICD-10-CM

## 2025-06-02 PROCEDURE — 25010000002 ALBUMIN HUMAN 25% PER 50 ML: Performed by: INTERNAL MEDICINE

## 2025-06-02 PROCEDURE — 96374 THER/PROPH/DIAG INJ IV PUSH: CPT

## 2025-06-02 PROCEDURE — 25010000002 FUROSEMIDE PER 20 MG: Performed by: INTERNAL MEDICINE

## 2025-06-02 PROCEDURE — P9047 ALBUMIN (HUMAN), 25%, 50ML: HCPCS | Performed by: INTERNAL MEDICINE

## 2025-06-02 PROCEDURE — 96365 THER/PROPH/DIAG IV INF INIT: CPT

## 2025-06-02 PROCEDURE — 96375 TX/PRO/DX INJ NEW DRUG ADDON: CPT

## 2025-06-02 RX ORDER — FUROSEMIDE 10 MG/ML
40 INJECTION INTRAMUSCULAR; INTRAVENOUS ONCE
Status: COMPLETED | OUTPATIENT
Start: 2025-06-02 | End: 2025-06-02

## 2025-06-02 RX ORDER — FUROSEMIDE 10 MG/ML
40 INJECTION INTRAMUSCULAR; INTRAVENOUS ONCE
Start: 2025-06-02 | End: 2025-06-02

## 2025-06-02 RX ORDER — ALBUMIN (HUMAN) 12.5 G/50ML
25 SOLUTION INTRAVENOUS ONCE
Start: 2025-06-02 | End: 2025-06-02

## 2025-06-02 RX ORDER — ALBUMIN (HUMAN) 12.5 G/50ML
25 SOLUTION INTRAVENOUS ONCE
Status: COMPLETED | OUTPATIENT
Start: 2025-06-02 | End: 2025-06-02

## 2025-06-02 RX ADMIN — FUROSEMIDE 40 MG: 10 INJECTION, SOLUTION INTRAMUSCULAR; INTRAVENOUS at 10:50

## 2025-06-02 RX ADMIN — ALBUMIN (HUMAN) 25 G: 0.25 INJECTION, SOLUTION INTRAVENOUS at 10:06

## 2025-06-13 ENCOUNTER — DISEASE STATE MANAGEMENT VISIT (OUTPATIENT)
Dept: PHARMACY | Facility: HOSPITAL | Age: 85
End: 2025-06-13
Payer: MEDICARE

## 2025-06-16 ENCOUNTER — HOSPITAL ENCOUNTER (OUTPATIENT)
Dept: INFUSION THERAPY | Facility: HOSPITAL | Age: 85
Discharge: HOME | End: 2025-06-16
Admitting: INTERNAL MEDICINE
Payer: MEDICARE

## 2025-06-16 VITALS
TEMPERATURE: 98.4 F | HEART RATE: 77 BPM | SYSTOLIC BLOOD PRESSURE: 145 MMHG | RESPIRATION RATE: 18 BRPM | DIASTOLIC BLOOD PRESSURE: 59 MMHG | OXYGEN SATURATION: 98 %

## 2025-06-16 DIAGNOSIS — K74.69: Primary | ICD-10-CM

## 2025-06-16 PROCEDURE — 96375 TX/PRO/DX INJ NEW DRUG ADDON: CPT

## 2025-06-16 PROCEDURE — 25010000002 ALBUMIN HUMAN 25% PER 50 ML: Performed by: INTERNAL MEDICINE

## 2025-06-16 PROCEDURE — 96374 THER/PROPH/DIAG INJ IV PUSH: CPT

## 2025-06-16 PROCEDURE — 96365 THER/PROPH/DIAG IV INF INIT: CPT

## 2025-06-16 PROCEDURE — P9047 ALBUMIN (HUMAN), 25%, 50ML: HCPCS | Performed by: INTERNAL MEDICINE

## 2025-06-16 PROCEDURE — 25010000002 FUROSEMIDE PER 20 MG: Performed by: INTERNAL MEDICINE

## 2025-06-16 RX ORDER — ALBUMIN (HUMAN) 12.5 G/50ML
25 SOLUTION INTRAVENOUS
Status: COMPLETED | OUTPATIENT
Start: 2025-06-16 | End: 2025-06-16

## 2025-06-16 RX ORDER — FUROSEMIDE 10 MG/ML
40 INJECTION INTRAMUSCULAR; INTRAVENOUS ONCE
Status: COMPLETED | OUTPATIENT
Start: 2025-06-16 | End: 2025-06-16

## 2025-06-16 RX ORDER — FUROSEMIDE 10 MG/ML
40 INJECTION INTRAMUSCULAR; INTRAVENOUS ONCE
Status: CANCELLED
Start: 2025-06-23 | End: 2025-06-23

## 2025-06-16 RX ORDER — ALBUMIN (HUMAN) 12.5 G/50ML
50 SOLUTION INTRAVENOUS ONCE
Status: CANCELLED
Start: 2025-06-23 | End: 2025-06-23

## 2025-06-16 RX ADMIN — ALBUMIN (HUMAN) 25 G: 0.25 INJECTION, SOLUTION INTRAVENOUS at 10:13

## 2025-06-16 RX ADMIN — FUROSEMIDE 40 MG: 10 INJECTION, SOLUTION INTRAMUSCULAR; INTRAVENOUS at 10:45

## 2025-06-16 RX ADMIN — ALBUMIN (HUMAN) 25 G: 0.25 INJECTION, SOLUTION INTRAVENOUS at 09:42

## 2025-06-23 ENCOUNTER — HOSPITAL ENCOUNTER (OUTPATIENT)
Dept: INFUSION THERAPY | Facility: HOSPITAL | Age: 85
Discharge: HOME | End: 2025-06-23
Admitting: INTERNAL MEDICINE
Payer: MEDICARE

## 2025-06-23 VITALS
DIASTOLIC BLOOD PRESSURE: 65 MMHG | HEART RATE: 75 BPM | SYSTOLIC BLOOD PRESSURE: 125 MMHG | RESPIRATION RATE: 16 BRPM | OXYGEN SATURATION: 97 % | TEMPERATURE: 98.5 F

## 2025-06-23 DIAGNOSIS — K74.69: Primary | ICD-10-CM

## 2025-06-23 PROCEDURE — P9047 ALBUMIN (HUMAN), 25%, 50ML: HCPCS | Performed by: INTERNAL MEDICINE

## 2025-06-23 PROCEDURE — 25010000002 ALBUMIN HUMAN 25% PER 50 ML: Performed by: INTERNAL MEDICINE

## 2025-06-23 PROCEDURE — 25010000002 FUROSEMIDE PER 20 MG: Performed by: INTERNAL MEDICINE

## 2025-06-23 PROCEDURE — 96365 THER/PROPH/DIAG IV INF INIT: CPT

## 2025-06-23 PROCEDURE — 96375 TX/PRO/DX INJ NEW DRUG ADDON: CPT

## 2025-06-23 RX ORDER — FUROSEMIDE 10 MG/ML
40 INJECTION INTRAMUSCULAR; INTRAVENOUS ONCE
Status: COMPLETED | OUTPATIENT
Start: 2025-06-23 | End: 2025-06-23

## 2025-06-23 RX ORDER — FUROSEMIDE 10 MG/ML
40 INJECTION INTRAMUSCULAR; INTRAVENOUS ONCE
Status: CANCELLED
Start: 2025-06-30 | End: 2025-06-30

## 2025-06-23 RX ORDER — ALBUMIN (HUMAN) 12.5 G/50ML
50 SOLUTION INTRAVENOUS ONCE
Status: CANCELLED
Start: 2025-06-30 | End: 2025-06-30

## 2025-06-23 RX ORDER — ALBUMIN (HUMAN) 12.5 G/50ML
25 SOLUTION INTRAVENOUS
Status: COMPLETED | OUTPATIENT
Start: 2025-06-23 | End: 2025-06-23

## 2025-06-23 RX ADMIN — ALBUMIN (HUMAN) 25 G: 0.25 INJECTION, SOLUTION INTRAVENOUS at 09:54

## 2025-06-23 RX ADMIN — ALBUMIN (HUMAN) 25 G: 0.25 INJECTION, SOLUTION INTRAVENOUS at 09:31

## 2025-06-23 RX ADMIN — FUROSEMIDE 40 MG: 10 INJECTION, SOLUTION INTRAMUSCULAR; INTRAVENOUS at 10:40

## 2025-06-30 ENCOUNTER — HOSPITAL ENCOUNTER (OUTPATIENT)
Dept: INFUSION THERAPY | Facility: HOSPITAL | Age: 85
Discharge: HOME | End: 2025-06-30
Admitting: INTERNAL MEDICINE
Payer: MEDICARE

## 2025-06-30 VITALS
DIASTOLIC BLOOD PRESSURE: 61 MMHG | OXYGEN SATURATION: 97 % | TEMPERATURE: 98.1 F | SYSTOLIC BLOOD PRESSURE: 126 MMHG | RESPIRATION RATE: 18 BRPM | HEART RATE: 74 BPM

## 2025-06-30 DIAGNOSIS — K74.69: Primary | ICD-10-CM

## 2025-06-30 PROCEDURE — 25010000002 ALBUMIN HUMAN 25% PER 50 ML: Performed by: INTERNAL MEDICINE

## 2025-06-30 PROCEDURE — 96374 THER/PROPH/DIAG INJ IV PUSH: CPT

## 2025-06-30 PROCEDURE — 96375 TX/PRO/DX INJ NEW DRUG ADDON: CPT

## 2025-06-30 PROCEDURE — P9047 ALBUMIN (HUMAN), 25%, 50ML: HCPCS | Performed by: INTERNAL MEDICINE

## 2025-06-30 PROCEDURE — 96365 THER/PROPH/DIAG IV INF INIT: CPT

## 2025-06-30 PROCEDURE — 25010000002 FUROSEMIDE PER 20 MG: Performed by: INTERNAL MEDICINE

## 2025-06-30 RX ORDER — FUROSEMIDE 10 MG/ML
40 INJECTION INTRAMUSCULAR; INTRAVENOUS ONCE
Status: CANCELLED
Start: 2025-07-07 | End: 2025-07-07

## 2025-06-30 RX ORDER — FUROSEMIDE 10 MG/ML
40 INJECTION INTRAMUSCULAR; INTRAVENOUS ONCE
Status: COMPLETED | OUTPATIENT
Start: 2025-06-30 | End: 2025-06-30

## 2025-06-30 RX ORDER — ALBUMIN (HUMAN) 12.5 G/50ML
50 SOLUTION INTRAVENOUS ONCE
Status: COMPLETED | OUTPATIENT
Start: 2025-06-30 | End: 2025-06-30

## 2025-06-30 RX ORDER — ALBUMIN (HUMAN) 12.5 G/50ML
50 SOLUTION INTRAVENOUS ONCE
Status: CANCELLED
Start: 2025-07-07 | End: 2025-07-07

## 2025-06-30 RX ADMIN — ALBUMIN (HUMAN) 50 G: 0.25 INJECTION, SOLUTION INTRAVENOUS at 09:35

## 2025-06-30 RX ADMIN — FUROSEMIDE 40 MG: 10 INJECTION, SOLUTION INTRAMUSCULAR; INTRAVENOUS at 10:38

## 2025-07-07 ENCOUNTER — HOSPITAL ENCOUNTER (OUTPATIENT)
Dept: INFUSION THERAPY | Facility: HOSPITAL | Age: 85
Discharge: HOME | End: 2025-07-07
Admitting: INTERNAL MEDICINE
Payer: MEDICARE

## 2025-07-07 VITALS
TEMPERATURE: 98.2 F | OXYGEN SATURATION: 97 % | HEART RATE: 75 BPM | RESPIRATION RATE: 16 BRPM | SYSTOLIC BLOOD PRESSURE: 128 MMHG | DIASTOLIC BLOOD PRESSURE: 56 MMHG

## 2025-07-07 DIAGNOSIS — K74.69: Primary | ICD-10-CM

## 2025-07-07 PROCEDURE — P9047 ALBUMIN (HUMAN), 25%, 50ML: HCPCS | Performed by: INTERNAL MEDICINE

## 2025-07-07 PROCEDURE — 96375 TX/PRO/DX INJ NEW DRUG ADDON: CPT

## 2025-07-07 PROCEDURE — 96365 THER/PROPH/DIAG IV INF INIT: CPT

## 2025-07-07 PROCEDURE — 96374 THER/PROPH/DIAG INJ IV PUSH: CPT

## 2025-07-07 PROCEDURE — 25010000002 FUROSEMIDE PER 20 MG: Performed by: INTERNAL MEDICINE

## 2025-07-07 PROCEDURE — 25010000002 ALBUMIN HUMAN 25% PER 50 ML: Performed by: INTERNAL MEDICINE

## 2025-07-07 RX ORDER — FUROSEMIDE 10 MG/ML
40 INJECTION INTRAMUSCULAR; INTRAVENOUS ONCE
Status: COMPLETED | OUTPATIENT
Start: 2025-07-07 | End: 2025-07-07

## 2025-07-07 RX ORDER — ALBUMIN (HUMAN) 12.5 G/50ML
50 SOLUTION INTRAVENOUS ONCE
Status: CANCELLED
Start: 2025-07-07 | End: 2025-07-07

## 2025-07-07 RX ORDER — FUROSEMIDE 10 MG/ML
40 INJECTION INTRAMUSCULAR; INTRAVENOUS ONCE
Status: CANCELLED
Start: 2025-07-07 | End: 2025-07-07

## 2025-07-07 RX ORDER — ALBUMIN (HUMAN) 12.5 G/50ML
25 SOLUTION INTRAVENOUS
Status: COMPLETED | OUTPATIENT
Start: 2025-07-07 | End: 2025-07-07

## 2025-07-07 RX ADMIN — ALBUMIN (HUMAN) 25 G: 0.25 INJECTION, SOLUTION INTRAVENOUS at 09:31

## 2025-07-07 RX ADMIN — FUROSEMIDE 40 MG: 10 INJECTION, SOLUTION INTRAMUSCULAR; INTRAVENOUS at 10:35

## 2025-07-07 RX ADMIN — ALBUMIN (HUMAN) 25 G: 0.25 INJECTION, SOLUTION INTRAVENOUS at 10:02

## 2025-07-10 ENCOUNTER — DISEASE STATE MANAGEMENT VISIT (OUTPATIENT)
Dept: PHARMACY | Facility: HOSPITAL | Age: 85
End: 2025-07-10
Payer: MEDICARE

## 2025-07-10 ENCOUNTER — HOSPITAL ENCOUNTER (OUTPATIENT)
Dept: CARDIOLOGY | Facility: HOSPITAL | Age: 85
Discharge: HOME | End: 2025-07-10
Admitting: PHYSICIAN ASSISTANT
Payer: MEDICARE

## 2025-07-10 VITALS
SYSTOLIC BLOOD PRESSURE: 125 MMHG | WEIGHT: 210 LBS | HEIGHT: 70 IN | OXYGEN SATURATION: 98 % | BODY MASS INDEX: 30.06 KG/M2 | DIASTOLIC BLOOD PRESSURE: 68 MMHG | HEART RATE: 77 BPM

## 2025-07-10 DIAGNOSIS — I50.22 CHRONIC HFREF (HEART FAILURE WITH REDUCED EJECTION FRACTION): ICD-10-CM

## 2025-07-10 DIAGNOSIS — I48.21 PERMANENT ATRIAL FIBRILLATION: Chronic | ICD-10-CM

## 2025-07-10 DIAGNOSIS — Z95.2 H/O MITRAL VALVE REPLACEMENT WITH MECHANICAL VALVE: Chronic | ICD-10-CM

## 2025-07-10 DIAGNOSIS — I50.22 CHRONIC SYSTOLIC CONGESTIVE HEART FAILURE: Primary | Chronic | ICD-10-CM

## 2025-07-10 DIAGNOSIS — Z95.810 CARDIAC RESYNCHRONIZATION THERAPY DEFIBRILLATOR (CRT-D) IN PLACE: ICD-10-CM

## 2025-07-10 DIAGNOSIS — D50.9 IRON DEFICIENCY ANEMIA, UNSPECIFIED IRON DEFICIENCY ANEMIA TYPE: ICD-10-CM

## 2025-07-10 DIAGNOSIS — K74.69: ICD-10-CM

## 2025-07-10 LAB
ABSOLUTE LUNG FLUID CONTENT: 34 % (ref 20–35)
ALBUMIN SERPL-MCNC: 3.7 G/DL (ref 3.5–5.2)
ALBUMIN/GLOB SERPL: 2.1 G/DL
ALP SERPL-CCNC: 75 U/L (ref 39–117)
ALT SERPL W P-5'-P-CCNC: 10 U/L (ref 1–41)
ANION GAP SERPL CALCULATED.3IONS-SCNC: 11 MMOL/L (ref 5–15)
AST SERPL-CCNC: 20 U/L (ref 1–40)
BILIRUB SERPL-MCNC: 1 MG/DL (ref 0–1.2)
BUN SERPL-MCNC: 32.7 MG/DL (ref 8–23)
BUN/CREAT SERPL: 27.5 (ref 7–25)
CALCIUM SPEC-SCNC: 8.2 MG/DL (ref 8.6–10.5)
CHLORIDE SERPL-SCNC: 104 MMOL/L (ref 98–107)
CO2 SERPL-SCNC: 25 MMOL/L (ref 22–29)
CREAT SERPL-MCNC: 1.19 MG/DL (ref 0.76–1.27)
EGFRCR SERPLBLD CKD-EPI 2021: 60.2 ML/MIN/1.73
GLOBULIN UR ELPH-MCNC: 1.8 GM/DL
GLUCOSE SERPL-MCNC: 105 MG/DL (ref 65–99)
MAGNESIUM SERPL-MCNC: 2.3 MG/DL (ref 1.6–2.4)
NT-PROBNP SERPL-MCNC: 661 PG/ML (ref 0–1800)
POTASSIUM SERPL-SCNC: 4.2 MMOL/L (ref 3.5–5.2)
PROT SERPL-MCNC: 5.5 G/DL (ref 6–8.5)
SODIUM SERPL-SCNC: 140 MMOL/L (ref 136–145)

## 2025-07-10 PROCEDURE — 83880 ASSAY OF NATRIURETIC PEPTIDE: CPT | Performed by: PHYSICIAN ASSISTANT

## 2025-07-10 PROCEDURE — 83735 ASSAY OF MAGNESIUM: CPT | Performed by: PHYSICIAN ASSISTANT

## 2025-07-10 PROCEDURE — 94726 PLETHYSMOGRAPHY LUNG VOLUMES: CPT | Performed by: PHYSICIAN ASSISTANT

## 2025-07-10 PROCEDURE — 80053 COMPREHEN METABOLIC PANEL: CPT | Performed by: PHYSICIAN ASSISTANT

## 2025-07-10 PROCEDURE — 36415 COLL VENOUS BLD VENIPUNCTURE: CPT | Performed by: PHYSICIAN ASSISTANT

## 2025-07-10 RX ORDER — EPLERENONE 50 MG/1
50 TABLET ORAL DAILY
Qty: 90 TABLET | Refills: 1 | Status: SHIPPED | OUTPATIENT
Start: 2025-07-10

## 2025-07-10 RX ORDER — SACUBITRIL AND VALSARTAN 24; 26 MG/1; MG/1
0.5 TABLET, FILM COATED ORAL 2 TIMES DAILY
Qty: 180 TABLET | Refills: 1 | Status: SHIPPED | OUTPATIENT
Start: 2025-07-10

## 2025-07-10 NOTE — PROGRESS NOTES
[facility]  SANDRO Enriquez Maria C., MD  [address] [place] [address]    Thank you for asking me to see Ezequiel Thompson for congestive heart failure.    HPI:     This is a 85 y.o. male with known past medical history of :    Mechanical mitral valve  HFrEF  Ischemic cardiomyopathy   History of CABG, [place], [place] (IDB)    Chronic systolic heart failure-EF 10 to 15% (IDB)    BiV ICD (BSC) - reportedly placed in [place], [place] (IDB)    Echocardiogram, 3/23/2021: EF 26-30%  TTE from 02/05/25 with EF 30%  CAD s/p CABG in [place], [place]  Permanent atrial fibrillation  CRT ICD in situ  Hx pleural effusion with what sounds like PleurX catheter several years ago per patient account  Essential HTN  Hyperlipidemia  Hypothyroidism  CKD  Cirrhosis  History of known Iron Deficiency    Ezequiel Thompson presents for today for Heart Failure clinic evaluation.  The patient is typically seen by Tram Mcgee MD.  Patient's primary cardiologist is Dr. Estuardo Brothers.         07/10/2025 visit data/details regarding:   Dyspnea:  Dyspnea with exertion  Lower extremity swelling:  Some swelling of lower extremities present but at baseline.   Abdominal swelling: Denies  Home weight: Weight monitoring booklet provided during initial visit; has scale.   Home BP: BP monitoring booklet provided during initial visit; has BP cuff.  SBP ranging from 100s-120s per review.    Home heart rate: HR monitoring booklet provided during initial visit  Daily activities of living: Performing on his own  Pillows/lying flat: Hospital bed   HF zone: Green  Mr. Thompson denies chest pain.  He reports shortness of breath with prolonged exertion.  He feels his lower extremity edema has improved bilaterally but he does have some edema persist at baseline.    He is not taking a beta blocker currently as he self stopped Coreg.   He reports it made him feel dizzy.   He reports his Eplerenone was reduced  to 50mg once daily.        Review of Systems - Review of Systems   Constitutional: Negative for decreased appetite and diaphoresis.   HENT:  Negative for congestion and ear pain.    Cardiovascular:  Positive for dyspnea on exertion and leg swelling.   Respiratory:  Positive for shortness of breath.    Endocrine: Negative for cold intolerance and heat intolerance.   Musculoskeletal:  Negative for arthritis and falls.   Gastrointestinal:  Negative for bloating and anorexia.   Genitourinary:  Negative for bladder incontinence and dysuria.   Psychiatric/Behavioral:  Negative for altered mental status and hallucinations.          All other systems were reviewed and were negative.    Patient Active Problem List   Diagnosis    Essential hypertension    Coronary artery disease involving native coronary artery of native heart with angina pectoris    Permanent atrial fibrillation    Chronic systolic congestive heart failure    Cellulitis of foot    PAD (peripheral artery disease)    Hyperlipidemia LDL goal <70    Mechanical mitral valve in situ    Cellulitis    Hepatic cirrhosis    Venous ulcer    Cardiac resynchronization therapy defibrillator (CRT-D) in place    Microcytic anemia    Iron deficiency anemia, unspecified    Malabsorption    Epistaxis    Portal cirrhosis       family history includes Emphysema in his father; Other in his brother; Stroke in his half-sister and mother.     reports that he has never smoked. He has never used smokeless tobacco. He reports current alcohol use. He reports that he does not use drugs.    Allergies   Allergen Reactions    Spironolactone Other (See Comments)     gynocomastia    Iodinated Contrast Media Hives    Iodine Hives    Levothyroxine Dizziness         Current Outpatient Medications:     allopurinol (ZYLOPRIM) 300 MG tablet, Take 0.5 tablets by mouth Daily., Disp: , Rfl:     bumetanide (BUMEX) 1 MG tablet, 2mg in the morning and 1 mg in the afternoon (Patient taking differently:  Take 1 tablet by mouth 2 (Two) Times a Day. 2mg in the morning and 1 mg in the afternoon), Disp: , Rfl:     empagliflozin (Jardiance) 10 MG tablet tablet, Take 1 tablet by mouth Daily., Disp: 90 tablet, Rfl: 3    eplerenone (INSPRA) 50 MG tablet, Take 1 tablet by mouth Daily., Disp: 90 tablet, Rfl: 1    FeroSul 325 (65 Fe) MG tablet, Take 1 tablet by mouth Daily., Disp: , Rfl:     potassium chloride (MICRO-K) 10 MEQ CR capsule, Take 2 capsules by mouth 3 (Three) Times a Day. 2AM At least 6 everyday, sometimes more if hand cramp/draw.  Might take 8  The most he would take is, Disp: , Rfl:     sacubitril-valsartan (Entresto) 24-26 MG tablet, Take 1/2 tablet by mouth 2 (Two) Times a Day., Disp: 180 tablet, Rfl: 1    warfarin (COUMADIN) 4 MG tablet, Take 1 tablet by mouth Daily. Mitral Valve  Dr. Mcgee  2.5-3.5, Disp: , Rfl:       Physical Exam:  I have reviewed the patient's current vital signs as documented in the patient's EMR.   Vitals:    07/10/25 0956   BP: 125/68   Pulse: 77   SpO2: 98%         Body mass index is 30.13 kg/m².       07/10/25  0956   Weight: 95.3 kg (210 lb)          Physical Exam  Vitals and nursing note reviewed.   Constitutional:       General: He is awake.      Appearance: Normal appearance.   HENT:      Head: Normocephalic.      Nose: No congestion or rhinorrhea.   Eyes:      Extraocular Movements: Extraocular movements intact.      Pupils: Pupils are equal, round, and reactive to light.   Cardiovascular:      Rate and Rhythm: Normal rate and regular rhythm.      Comments: Mechanical valve click appreciated  Pulmonary:      Breath sounds: No stridor. No wheezing or rales.   Abdominal:      General: Bowel sounds are normal. There is no distension.   Musculoskeletal:         General: Swelling present.      Right lower leg: Edema present.      Left lower leg: Edema present.      Comments: Non-pitting   Skin:     General: Skin is warm and dry.      Coloration: Skin is not jaundiced.    Neurological:      Mental Status: He is alert and oriented to person, place, and time.   Psychiatric:         Behavior: Behavior is cooperative.          JVP: Volume/Pulsation: Normal.        DATA REVIEWED:     ---------------------------------------------------  TTE/ROSA:  Results for orders placed during the hospital encounter of 02/05/25    Adult Transthoracic Echo Complete W/ Cont if Necessary Per Protocol 02/06/2025  5:42 PM    Interpretation Summary    Left ventricular systolic function is moderately decreased. Estimated left ventricular EF = 30%    The left atrial cavity is severely dilated.    There is calcification of the aortic valve.    There is a mechanical mitral valve prosthesis present.  The mechanical valve appears to function normally.    Moderate tricuspid valve regurgitation is present.    Estimated right ventricular systolic pressure from tricuspid regurgitation is mildly elevated (40 mmHg).        LAST HEART CATH RESULT/IF AVAILABLE:     No results found for this or any previous visit.      -----------------------------------------------------  CXR/Imaging:   Imaging Results (Most Recent)       None                -----------------------------------------------------  CT:   No radiology results for the last 30 days.      ----------------------------------------------------      --------------------------------------------------------------------------------------------------    Laboratory evaluations:    Lab Results   Component Value Date    GLUCOSE 105 (H) 07/10/2025    BUN 32.7 (H) 07/10/2025    CREATININE 1.19 07/10/2025    EGFRIFNONA 50 (L) 02/01/2022    BCR 27.5 (H) 07/10/2025    K 4.2 07/10/2025    CO2 25.0 07/10/2025    CALCIUM 8.2 (L) 07/10/2025    ALBUMIN 3.7 07/10/2025    AST 20 07/10/2025    ALT 10 07/10/2025     Lab Results   Component Value Date    WBC 5.13 03/01/2025    HGB 12.5 (L) 03/01/2025    HCT 40.3 03/01/2025    MCV 94.2 03/01/2025     (L) 03/01/2025     No results  "found for: \"CHOL\", \"CHLPL\", \"TRIG\", \"HDL\", \"LDL\", \"LDLDIRECT\"  No results found for: \"TSH\", \"N4IDPMN\", \"Z4CSVDK\", \"THYROIDAB\"  No results found for: \"HGBA1C\"  Lab Results   Component Value Date    ALT 10 07/10/2025     No results found for: \"HGBA1C\"  Lab Results   Component Value Date    CREATININE 1.19 07/10/2025     Lab Results   Component Value Date    IRON 63 05/08/2025    TIBC 425 05/08/2025    FERRITIN 38.84 05/08/2025     Lab Results   Component Value Date    INR 1.98 (H) 03/01/2025    INR 2.37 (H) 02/18/2025    INR 2.43 (H) 02/17/2025    PROTIME 22.8 (H) 03/01/2025    PROTIME 26.2 (H) 02/18/2025    PROTIME 26.7 (H) 02/17/2025        Lab Results   Component Value Date    ABSOLUTELUNG 34 07/10/2025    ABSOLUTELUNG 34 05/08/2025    ABSOLUTELUNG 29 03/04/2025           1. Chronic systolic congestive heart failure    2. Portal cirrhosis    3. Chronic HFrEF (heart failure with reduced ejection fraction)    4. Mechanical mitral valve in situ    5. Permanent atrial fibrillation    6. Absolute iron deficiency with anemia    7. Cardiac resynchronization therapy defibrillator (CRT-D) in place            ORDERS PLACED TODAY:  Orders Placed This Encounter   Procedures    ReDs Vest    Magnesium    proBNP    Comprehensive Metabolic Panel    Magnesium    proBNP    Comprehensive Metabolic Panel        Diagnoses and all orders for this visit:    1. Chronic systolic congestive heart failure (Primary)  Overview:  Echocardiogram (7/10/2020): Severe LV dysfunction.  Normal functioning mechanical mitral valve.  Moderate to severe TR, mild pulmonary hypertension with RVSP 45 mmHg  Echocardiogram (3/23/2021): LVEF 26-30% with dilated LV.  Mechanical mitral valve with mild MR.  RVSP 45-55 mmHg  Intolerant to spironolactone  Echo (2/5/2025): LVEF 30%.  Dilated left atrium.  Normal functioning mechanical mitral valve.  Moderate TR.  RVSP 40 mmHg.  Intolerant to spironolactone (gynecomastia)    Orders:  -     Magnesium; Future  -     " proBNP; Future  -     Comprehensive Metabolic Panel; Future  -     Magnesium; Standing  -     Magnesium  -     proBNP; Standing  -     proBNP  -     Comprehensive Metabolic Panel; Standing  -     Comprehensive Metabolic Panel  -     ReDs Vest  -     sacubitril-valsartan (Entresto) 24-26 MG tablet; Take 1/2 tablet by mouth 2 (Two) Times a Day.  Dispense: 180 tablet; Refill: 1  -     eplerenone (INSPRA) 50 MG tablet; Take 1 tablet by mouth Daily.  Dispense: 90 tablet; Refill: 1    2. Portal cirrhosis    3. Chronic HFrEF (heart failure with reduced ejection fraction)    4. Mechanical mitral valve in situ  Overview:  Previous mechanical MVR  Echocardiogram (7/10/2020): Severe LV dysfunction.  Normal functioning mechanical mitral valve.  Moderate to severe TR, mild pulmonary hypertension with RVSP 45 mmHg  Echocardiogram (3/23/2021): LVEF 26-30% with dilated LV.  Mechanical mitral valve with mild MR.  RVSP 45-55 mmHg  Echo (2/5/2025): LVEF 30%.  Dilated left atrium.  Normal functioning mechanical mitral valve.  Moderate TR.  RVSP 40 mmHg.      5. Permanent atrial fibrillation  Overview:  [id number] 6 (age >75, CHF, CAD, HTN, CVA)      6. Absolute iron deficiency with anemia  Comments:  -May iron panel with Ferritin  less than 41.    7. Cardiac resynchronization therapy defibrillator (CRT-D) in place  Overview:  [employer] ICD for primary prevention                 MEDS ORDERED TODAY:    New Medications Ordered This Visit   Medications    sacubitril-valsartan (Entresto) 24-26 MG tablet     Sig: Take 1/2 tablet by mouth 2 (Two) Times a Day.     Dispense:  180 tablet     Refill:  1    eplerenone (INSPRA) 50 MG tablet     Sig: Take 1 tablet by mouth Daily.     Dispense:  90 tablet     Refill:  1        ---------------------------------------------------------------------------------------------------------------------------          ASSESSMENT/PLAN:      Diagnosis Plan   1. Chronic systolic congestive heart failure   Magnesium    proBNP    Comprehensive Metabolic Panel    Magnesium    Magnesium    proBNP    proBNP    Comprehensive Metabolic Panel    Comprehensive Metabolic Panel    ReDs Vest    sacubitril-valsartan (Entresto) 24-26 MG tablet    eplerenone (INSPRA) 50 MG tablet      2. Portal cirrhosis        3. Chronic HFrEF (heart failure with reduced ejection fraction)        4. Mechanical mitral valve in situ        5. Permanent atrial fibrillation        6. Absolute iron deficiency with anemia      -May iron panel with Ferritin  less than 41.      7. Cardiac resynchronization therapy defibrillator (CRT-D) in place              not acutely decompensated chronic moderate systolic heart failure. CHF.     NYHA stage Stage C: Structural heart disease is present AND symptoms have occurredFC-Class II: Slight limitation of physical activity. Comfortable at rest Ordinary physical activity results in fatigue, palpitation, dyspnea (shortness of breath).     Today, Patient is mildly volume overloadedand with  Excellent perfusion. The patient's hemodynamics are currently acceptable. HR is: normal and is at goal. BP/MAP was reviewed and there is not room for medication up-titration.  Clinical trajectory was assessed and hasimproved.     CHF GOAL DIRECTED MEDICAL THERAPY FOR PATIENT ADDRESSED/ADJUSTED:     GDMT: HFrEF    Drug Class   Drug   Dose Last Dose Adjustment Notes   ACEi/ARB/ARNI Entresto 24-26mg BID 1/2 tab     Beta Blocker     MRA Eplerenone 50mg qd     SGLT2i Jardiance 10mg  N/A   Secondaries if applicable:          -CHF Specific BB:    Patient self stopped beta blocker reporting dizziness.  He reports his PCP started him on Propranolol in June 2025.  He reports he filled the medication but has not been taking it.    Importance of beta blocker therapy in HFrEF discussed, if tolerated.  He is currently unwilling to restart beta blocker thearpy.    BP log previously provided for patient to keep.  He brings in with him on today's  visits with BPs well controlled.       -ACE/ARB/ARNi:   Entresto 24-26mg BID 1/ tab continued with patient tolerating.      -MRA:   Continue Eplerenone 50mg qd.    Prior gynecomastia with Spironolactone.      -SGLT2 inhibitor therapy:   Recommend continuing Jardiance (empagliflozin) 10mg with quarterly assessment of GFR.  Pharmacy helped with medication assistance with patient now receiving medication mailed to his home affordably.    Pt was advised SEs, some severe, including hypersensitivity and Payal's; coupled with discussion regarding common side effects of UTIs and female genital mycotic infections were discussed. If you will be NPO, or are sick (poor PO intake, N&V) please hold the medication until you are back to a normal diet.     -Diuretic regimen:   ReDS Vest reading for. 07/10/25 is 34; ReDs Vest reading reviewed with patient.    Continue Bumex 2mg in the morning.   He is receiving intermittent albumin infusions with lasix at the infusion clinic via his Internist.    ProBNP, CMP, & Mag obtained and reviewed.       -Fluid restriction/Sodium restriction:   Requested 2000 ml restriction  Patient has been asked to weigh daily and was provided with a printed diuretic strategy.  1,500 mg Na restriction was discussed.        -Acute and/or Chronic underlying conditions other than HF addressed during visit:   Permanent atrial fibrillation:   Mechanical valve mitral:   ASCVD s/p prior CABG:   Continue Coumadin with mechanical valve (managed per Dr. Mcgee's office.)   Continue f/u with Dr. Brothers.      Iron Deficiency anemia:   On oral iron therapy at present. Reports he is compliant with this.   TSAT is less than 15.  Ferritin was less than 100ng/mL @ 38.84.    Discussed iron deficiency being common in heart failure with iron deficiency negatively impacting functional capacity, quality of life, and life expectancy in patients with heart failure (HF). A serum ferritin <41 ng/mL or a TSAT <20 percent is  strongly suggestive of iron deficiency in patients with HF.  Discussed benefits of possible iron infusions.  At present, Mr. Thompson wishes to continue his oral iron therapy and f/u with PCP.        Portal Cirrhosis:   CMP reviewed.   Continue f/u with PCP.     Identifiable barriers to Heart Failure Self-care:   Medical Barriers: Frailty  Social Barriers: Financial Jackson of HF treatment              This document has been electronically signed by Liz Lazaro PA-C  July 10, 2025 15:31 EDT      Dictated Utilizing Dragon Dictation: Part of this note may be an electronic transcription/translation of spoken language to printed text using the Dragon Dictation System.    Follow-up appointment and medication changes provided in hand delivered After Visit Summary as well as reviewed in the room.        Some documentation in this note has been copied forward from a previous note, as the information is still current and accurate.  Text has been changed to reflect changes.

## 2025-07-10 NOTE — PROGRESS NOTES
Heart Failure Clinic    Date: 07/10/25     Vitals:    07/10/25 0956   BP: 125/68   Pulse: 77   SpO2: 98%      Weight 210  Method of arrival: Ambulatory    Weighing self daily: Most days    Monitoring Heart Failure Zones: Most days    Today's HF Zone: Green    Taking medications as prescribed: Yes    Edema Yes-improved    Shortness of Air: Yes with activity    Number of pillows used at night:<2    Educational Materials given:  avmyles Hinton Value: 34  25-35 Optimal Value Status      Yamilka Ellington RN 07/10/25 09:58 EDT
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
## 2025-07-10 NOTE — PATIENT INSTRUCTIONS
Heart Failure Action Plan  A heart failure action plan helps you know what to do when you have symptoms of heart failure. Your action plan is a color-coded plan that lists the symptoms to watch for and indicates what actions to take.  If you have symptoms in the green zone, you're doing well.  If you have symptoms in the yellow zone, you're having problems.  If you have symptoms in the red zone, you need medical care right away.  Follow the plan that was created by you and your health care provider. Review your plan each time you visit your provider.  Green zone  These signs mean you're doing well and can continue what you're doing:  You don't have new or worsening shortness of breath.  You have very little swelling or no new swelling.  Your weight is stable (no gain or loss).  You have a normal activity level.  You don't have chest pain or any other new symptoms.  Yellow zone  These signs and symptoms mean your condition may be getting worse and you should make some changes:  You have trouble breathing when you're active.  You have swelling in your feet or legs or have discomfort in your belly.  You gain 2-3 lb (0.9-1.4 kg) in 24 hours, or 5 lb (2.3 kg) in a week. This amount may be more or less depending on your condition.  You get tired easily.  You have trouble sleeping.  You have a dry cough.  If you have any of these symptoms:  Contact your provider within the next day.  Your provider may adjust your medicines.  Red zone  These signs and symptoms mean you should get medical help right away:  You have trouble breathing when resting or cannot lie flat and you need to raise your head to help you breathe.  You have a dry cough that's getting worse.  You have swelling or pain in your feet or legs or discomfort in your belly that's getting worse.  You suddenly gain more than 2-3 lb (0.9-1.4 kg) in 24 hours, or more than 5 lb (2.3 kg) in a week. This amount may be more or less depending on your condition.  You have  trouble staying awake or you feel confused.  You don't have an appetite.  You have worsening sadness or depression.  These symptoms may be an emergency. Call 911 right away.  Do not wait to see if the symptoms will go away.  Do not drive yourself to the hospital.  Follow these instructions at home:  Take medicines only as told.  Eat a heart-healthy diet. Work with a dietitian to create an eating plan that's best for you.  Weigh yourself each day.   Call your provider if you gain more than 3 lb in 24 hours, or more than 5 lb in a week.  Health care provider name: [facility] care provider phone number: [phone]

## 2025-07-17 ENCOUNTER — OFFICE VISIT (OUTPATIENT)
Dept: GASTROENTEROLOGY | Facility: CLINIC | Age: 85
End: 2025-07-17
Payer: MEDICARE

## 2025-07-17 VITALS
WEIGHT: 214 LBS | HEIGHT: 70 IN | HEART RATE: 76 BPM | BODY MASS INDEX: 30.64 KG/M2 | DIASTOLIC BLOOD PRESSURE: 63 MMHG | SYSTOLIC BLOOD PRESSURE: 118 MMHG

## 2025-07-17 DIAGNOSIS — R13.19 ESOPHAGEAL DYSPHAGIA: ICD-10-CM

## 2025-07-17 DIAGNOSIS — K72.90 DECOMPENSATED CIRRHOSIS: Primary | ICD-10-CM

## 2025-07-17 DIAGNOSIS — K74.60 DECOMPENSATED CIRRHOSIS: Primary | ICD-10-CM

## 2025-07-17 LAB — ALPHA-FETOPROTEIN: <2 NG/ML (ref 0–8.3)

## 2025-07-17 PROCEDURE — 82105 ALPHA-FETOPROTEIN SERUM: CPT
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
## 2025-07-18 ENCOUNTER — RESULTS FOLLOW-UP (OUTPATIENT)
Dept: GASTROENTEROLOGY | Facility: CLINIC | Age: 85
End: 2025-07-18
Payer: MEDICARE

## 2025-07-23 LAB
MC_CV_MDC_IDC_RATE_1: 180
MC_CV_MDC_IDC_RATE_1: 220
MC_CV_MDC_IDC_SHOCK_MEASURED_IMPEDANCE: 52
MC_CV_MDC_IDC_THERAPIES: NORMAL
MC_CV_MDC_IDC_THERAPIES: NORMAL
MC_CV_MDC_IDC_ZONE_ID: 1
MC_CV_MDC_IDC_ZONE_ID: 2
MDC_IDC_MSMT_BATTERY_REMAINING_LONGEVITY: 108 MO
MDC_IDC_MSMT_BATTERY_REMAINING_PERCENTAGE: 100 %
MDC_IDC_MSMT_BATTERY_STATUS: NORMAL
MDC_IDC_MSMT_CAP_CHARGE_TIME: 10.5
MDC_IDC_MSMT_LEADCHNL_LV_DTM: NORMAL
MDC_IDC_MSMT_LEADCHNL_LV_IMPEDANCE_VALUE: 527
MDC_IDC_MSMT_LEADCHNL_LV_PACING_THRESHOLD_POLARITY: NORMAL
MDC_IDC_MSMT_LEADCHNL_LV_SENSING_INTR_AMPL: 19
MDC_IDC_MSMT_LEADCHNL_RA_DTM: NORMAL
MDC_IDC_MSMT_LEADCHNL_RA_IMPEDANCE_VALUE: 381
MDC_IDC_MSMT_LEADCHNL_RA_PACING_THRESHOLD_POLARITY: NORMAL
MDC_IDC_MSMT_LEADCHNL_RV_DTM: NORMAL
MDC_IDC_MSMT_LEADCHNL_RV_IMPEDANCE_VALUE: 356
MDC_IDC_MSMT_LEADCHNL_RV_PACING_THRESHOLD_POLARITY: NORMAL
MDC_IDC_MSMT_LEADCHNL_RV_SENSING_INTR_AMPL: 7.8
MDC_IDC_PG_IMPLANT_DTM: NORMAL
MDC_IDC_PG_MFG: NORMAL
MDC_IDC_PG_MODEL: NORMAL
MDC_IDC_PG_SERIAL: NORMAL
MDC_IDC_PG_TYPE: NORMAL
MDC_IDC_SESS_DTM: NORMAL
MDC_IDC_SESS_TYPE: NORMAL
MDC_IDC_SET_BRADY_AT_MODE_SWITCH_RATE: 170
MDC_IDC_SET_BRADY_LOWRATE: 75
MDC_IDC_SET_BRADY_MAX_SENSOR_RATE: 130
MDC_IDC_SET_BRADY_MODE: NORMAL
MDC_IDC_SET_CRT_LVRV_DELAY: 10
MDC_IDC_SET_CRT_PACED_CHAMBERS: NORMAL
MDC_IDC_SET_LEADCHNL_LV_PACING_AMPLITUDE: 2.2
MDC_IDC_SET_LEADCHNL_LV_PACING_PULSEWIDTH: 0.4
MDC_IDC_SET_LEADCHNL_RA_PACING_POLARITY: NORMAL
MDC_IDC_SET_LEADCHNL_RA_SENSING_POLARITY: NORMAL
MDC_IDC_SET_LEADCHNL_RA_SENSING_SENSITIVITY: 0.25
MDC_IDC_SET_LEADCHNL_RV_PACING_AMPLITUDE: 2
MDC_IDC_SET_LEADCHNL_RV_PACING_POLARITY: NORMAL
MDC_IDC_SET_LEADCHNL_RV_PACING_PULSEWIDTH: 0.4
MDC_IDC_SET_LEADCHNL_RV_SENSING_POLARITY: NORMAL
MDC_IDC_SET_LEADCHNL_RV_SENSING_SENSITIVITY: 0.6
MDC_IDC_SET_ZONE_STATUS: NORMAL
MDC_IDC_SET_ZONE_STATUS: NORMAL
MDC_IDC_SET_ZONE_TYPE: NORMAL
MDC_IDC_SET_ZONE_TYPE: NORMAL
MDC_IDC_STAT_BRADY_RA_PERCENT_PACED: 0
MDC_IDC_STAT_BRADY_RV_PERCENT_PACED: 86
MDC_IDC_STAT_CRT_LV_PERCENT_PACED: 86
MDC_IDC_STAT_TACHYTHERAPY_ATP_DELIVERED_RECENT: 2
MDC_IDC_STAT_TACHYTHERAPY_SHOCKS_ABORTED_RECENT: 0
MDC_IDC_STAT_TACHYTHERAPY_SHOCKS_DELIVERED_RECENT: 0

## 2025-07-28 ENCOUNTER — HOSPITAL ENCOUNTER (OUTPATIENT)
Dept: INFUSION THERAPY | Facility: HOSPITAL | Age: 85
Discharge: HOME | End: 2025-07-28
Admitting: INTERNAL MEDICINE
Payer: MEDICARE

## 2025-07-28 VITALS
DIASTOLIC BLOOD PRESSURE: 46 MMHG | RESPIRATION RATE: 20 BRPM | HEART RATE: 77 BPM | TEMPERATURE: 97.6 F | SYSTOLIC BLOOD PRESSURE: 102 MMHG | OXYGEN SATURATION: 96 %

## 2025-07-28 DIAGNOSIS — K74.69: Primary | ICD-10-CM

## 2025-07-28 PROCEDURE — 96365 THER/PROPH/DIAG IV INF INIT: CPT

## 2025-07-28 PROCEDURE — 25010000002 ALBUMIN HUMAN 25% PER 50 ML: Performed by: INTERNAL MEDICINE

## 2025-07-28 PROCEDURE — 25010000002 FUROSEMIDE PER 20 MG: Performed by: INTERNAL MEDICINE

## 2025-07-28 PROCEDURE — P9047 ALBUMIN (HUMAN), 25%, 50ML: HCPCS | Performed by: INTERNAL MEDICINE

## 2025-07-28 PROCEDURE — 96375 TX/PRO/DX INJ NEW DRUG ADDON: CPT

## 2025-07-28 RX ORDER — FUROSEMIDE 10 MG/ML
40 INJECTION INTRAMUSCULAR; INTRAVENOUS ONCE
Start: 2025-08-04 | End: 2025-08-04

## 2025-07-28 RX ORDER — ALBUMIN (HUMAN) 12.5 G/50ML
25 SOLUTION INTRAVENOUS
Start: 2025-08-04

## 2025-07-28 RX ORDER — FUROSEMIDE 10 MG/ML
40 INJECTION INTRAMUSCULAR; INTRAVENOUS ONCE
Status: COMPLETED | OUTPATIENT
Start: 2025-07-28 | End: 2025-07-28

## 2025-07-28 RX ORDER — ALBUMIN (HUMAN) 12.5 G/50ML
25 SOLUTION INTRAVENOUS
Status: COMPLETED | OUTPATIENT
Start: 2025-07-28 | End: 2025-07-28

## 2025-07-28 RX ADMIN — FUROSEMIDE 40 MG: 10 INJECTION, SOLUTION INTRAMUSCULAR; INTRAVENOUS at 12:17

## 2025-07-28 RX ADMIN — ALBUMIN (HUMAN) 25 G: 0.25 INJECTION, SOLUTION INTRAVENOUS at 11:15

## 2025-07-28 RX ADMIN — ALBUMIN (HUMAN) 25 G: 0.25 INJECTION, SOLUTION INTRAVENOUS at 11:45

## 2025-07-29 ENCOUNTER — TELEPHONE (OUTPATIENT)
Dept: GASTROENTEROLOGY | Facility: CLINIC | Age: 85
End: 2025-07-29

## 2025-07-30 ENCOUNTER — TELEPHONE (OUTPATIENT)
Dept: CARDIOLOGY | Facility: CLINIC | Age: 85
End: 2025-07-30
Payer: MEDICARE

## 2025-07-30 NOTE — TELEPHONE ENCOUNTER
Per [employer] Lat[employer] remote transmission from pt's BIV ICD:    Elevated HeartLogic Index-28, thoracic impedance trend improving @ 36.5 ohms (pt's mean thoracic impedance ~ 33 ohms).   Hx permeant Afib   BIV pacing decreased from 97% to 85% since 4/23/2025.     Pt reports swelling to his lower extremities are better but reports shortness of breath Monday & Tuesday night (7/28 & 7/29/2025). Pt reports compliance with cardiac Rx. Pt had infusion of albumin & furosemide Monday, 7/28/2025.

## 2025-07-31 ENCOUNTER — TELEPHONE (OUTPATIENT)
Dept: GASTROENTEROLOGY | Facility: CLINIC | Age: 85
End: 2025-07-31
Payer: MEDICARE

## 2025-07-31 LAB
MC_CV_MDC_IDC_RATE_1: 180
MC_CV_MDC_IDC_RATE_1: 220
MC_CV_MDC_IDC_SHOCK_MEASURED_IMPEDANCE: 62
MC_CV_MDC_IDC_THERAPIES: NORMAL
MC_CV_MDC_IDC_THERAPIES: NORMAL
MC_CV_MDC_IDC_ZONE_ID: 1
MC_CV_MDC_IDC_ZONE_ID: 2
MDC_IDC_MSMT_BATTERY_REMAINING_LONGEVITY: 114 MO
MDC_IDC_MSMT_BATTERY_REMAINING_PERCENTAGE: 100 %
MDC_IDC_MSMT_BATTERY_STATUS: NORMAL
MDC_IDC_MSMT_CAP_CHARGE_TIME: 10.5
MDC_IDC_MSMT_LEADCHNL_LV_DTM: NORMAL
MDC_IDC_MSMT_LEADCHNL_LV_IMPEDANCE_VALUE: 534
MDC_IDC_MSMT_LEADCHNL_LV_PACING_THRESHOLD_POLARITY: NORMAL
MDC_IDC_MSMT_LEADCHNL_LV_SENSING_INTR_AMPL: 22.1
MDC_IDC_MSMT_LEADCHNL_RA_DTM: NORMAL
MDC_IDC_MSMT_LEADCHNL_RA_IMPEDANCE_VALUE: 375
MDC_IDC_MSMT_LEADCHNL_RA_PACING_THRESHOLD_POLARITY: NORMAL
MDC_IDC_MSMT_LEADCHNL_RV_DTM: NORMAL
MDC_IDC_MSMT_LEADCHNL_RV_IMPEDANCE_VALUE: 358
MDC_IDC_MSMT_LEADCHNL_RV_PACING_THRESHOLD_POLARITY: NORMAL
MDC_IDC_MSMT_LEADCHNL_RV_SENSING_INTR_AMPL: 8.3
MDC_IDC_PG_IMPLANT_DTM: NORMAL
MDC_IDC_PG_MFG: NORMAL
MDC_IDC_PG_MODEL: NORMAL
MDC_IDC_PG_SERIAL: NORMAL
MDC_IDC_PG_TYPE: NORMAL
MDC_IDC_SESS_DTM: NORMAL
MDC_IDC_SESS_TYPE: NORMAL
MDC_IDC_SET_BRADY_AT_MODE_SWITCH_RATE: 170
MDC_IDC_SET_BRADY_LOWRATE: 75
MDC_IDC_SET_BRADY_MAX_SENSOR_RATE: 130
MDC_IDC_SET_BRADY_MODE: NORMAL
MDC_IDC_SET_CRT_LVRV_DELAY: 10
MDC_IDC_SET_CRT_PACED_CHAMBERS: NORMAL
MDC_IDC_SET_LEADCHNL_LV_PACING_AMPLITUDE: 2.2
MDC_IDC_SET_LEADCHNL_LV_PACING_PULSEWIDTH: 0.4
MDC_IDC_SET_LEADCHNL_RA_PACING_POLARITY: NORMAL
MDC_IDC_SET_LEADCHNL_RA_SENSING_POLARITY: NORMAL
MDC_IDC_SET_LEADCHNL_RA_SENSING_SENSITIVITY: 0.25
MDC_IDC_SET_LEADCHNL_RV_PACING_AMPLITUDE: 2
MDC_IDC_SET_LEADCHNL_RV_PACING_POLARITY: NORMAL
MDC_IDC_SET_LEADCHNL_RV_PACING_PULSEWIDTH: 0.4
MDC_IDC_SET_LEADCHNL_RV_SENSING_POLARITY: NORMAL
MDC_IDC_SET_LEADCHNL_RV_SENSING_SENSITIVITY: 0.6
MDC_IDC_SET_ZONE_STATUS: NORMAL
MDC_IDC_SET_ZONE_STATUS: NORMAL
MDC_IDC_SET_ZONE_TYPE: NORMAL
MDC_IDC_SET_ZONE_TYPE: NORMAL
MDC_IDC_STAT_BRADY_RA_PERCENT_PACED: 0
MDC_IDC_STAT_BRADY_RV_PERCENT_PACED: 87
MDC_IDC_STAT_CRT_LV_PERCENT_PACED: 86
MDC_IDC_STAT_TACHYTHERAPY_ATP_DELIVERED_RECENT: 2
MDC_IDC_STAT_TACHYTHERAPY_SHOCKS_ABORTED_RECENT: 0
MDC_IDC_STAT_TACHYTHERAPY_SHOCKS_DELIVERED_RECENT: 0

## 2025-07-31 NOTE — TELEPHONE ENCOUNTER
Returned patient's wife call regarding EGD/colonoscopy.   wife states that patient has appointment tomorrow for EGD/colonoscopy or so she was told by the  staff.  Discussed with patient's wife the patient was not scheduled for EGD colonoscopy.  No gastroenterologist are available on Friday to scope in [place].  Discussed with her that  unfortunately due to the upcoming longterm of Dr. Rae and patient's history of cirrhosis he would need to be scoped by gastroenterologist.  Patient has been referred to [facility] to have an EGD performed in [place].  Discussed with  with her that patient did not need screening colonoscopy given his age and lack of lower GI symptoms.  Discussed that given his age and comorbidity he was at increased risk for complications if proceeding with colonoscopy.  She verbalized understanding..  Discussed that it appears patient is scheduled for esophagram and ultrasound tomorrow.  Encouraged patient to keep this appointment.  She verbalized understanding.

## 2025-08-01 ENCOUNTER — HOSPITAL ENCOUNTER (INPATIENT)
Facility: HOSPITAL | Age: 85
LOS: 4 days | Discharge: HOME | DRG: 280 | End: 2025-08-05
Admitting: HOSPITALIST
Payer: MEDICARE

## 2025-08-01 ENCOUNTER — APPOINTMENT (OUTPATIENT)
Dept: GENERAL RADIOLOGY | Facility: HOSPITAL | Age: 85
DRG: 280 | End: 2025-08-01
Payer: MEDICARE

## 2025-08-01 DIAGNOSIS — I50.21 ACUTE SYSTOLIC HEART FAILURE: Primary | ICD-10-CM

## 2025-08-01 DIAGNOSIS — N17.9 ACUTE KIDNEY INJURY: ICD-10-CM

## 2025-08-01 DIAGNOSIS — I50.23 ACUTE ON CHRONIC HFREF (HEART FAILURE WITH REDUCED EJECTION FRACTION): ICD-10-CM

## 2025-08-01 LAB
ABSOLUTE LUNG FLUID CONTENT: 37 % (ref 20–35)
ALBUMIN SERPL-MCNC: 3.4 G/DL (ref 3.5–5.2)
ALBUMIN/GLOB SERPL: 1.5 G/DL
ALP SERPL-CCNC: 73 U/L (ref 39–117)
ALT SERPL W P-5'-P-CCNC: 13 U/L (ref 1–41)
ANION GAP SERPL CALCULATED.3IONS-SCNC: 12 MMOL/L (ref 5–15)
AST SERPL-CCNC: 18 U/L (ref 1–40)
BASOPHILS # BLD AUTO: 0.04 10*3/MM3 (ref 0–0.2)
BASOPHILS NFR BLD AUTO: 0.5 % (ref 0–1.5)
BILIRUB SERPL-MCNC: 1.2 MG/DL (ref 0–1.2)
BUN SERPL-MCNC: 40.7 MG/DL (ref 8–23)
BUN/CREAT SERPL: 25 (ref 7–25)
CALCIUM SPEC-SCNC: 7.8 MG/DL (ref 8.6–10.5)
CHLORIDE SERPL-SCNC: 100 MMOL/L (ref 98–107)
CO2 SERPL-SCNC: 24 MMOL/L (ref 22–29)
CREAT SERPL-MCNC: 1.63 MG/DL (ref 0.76–1.27)
DEPRECATED RDW RBC AUTO: 54.2 FL (ref 37–54)
EGFRCR SERPLBLD CKD-EPI 2021: 41.3 ML/MIN/1.73
EOSINOPHIL # BLD AUTO: 0.26 10*3/MM3 (ref 0–0.4)
EOSINOPHIL NFR BLD AUTO: 3.2 % (ref 0.3–6.2)
ERYTHROCYTE [DISTWIDTH] IN BLOOD BY AUTOMATED COUNT: 16.5 % (ref 12.3–15.4)
GEN 5 1HR TROPONIN T REFLEX: 70 NG/L
GLOBULIN UR ELPH-MCNC: 2.2 GM/DL
GLUCOSE SERPL-MCNC: 112 MG/DL (ref 65–99)
HCT VFR BLD AUTO: 40.9 % (ref 37.5–51)
HGB BLD-MCNC: 13.2 G/DL (ref 13–17.7)
IMM GRANULOCYTES # BLD AUTO: 0.04 10*3/MM3 (ref 0–0.05)
IMM GRANULOCYTES NFR BLD AUTO: 0.5 % (ref 0–0.5)
INR PPP: 4.24 (ref 0.9–1.1)
LYMPHOCYTES # BLD AUTO: 0.49 10*3/MM3 (ref 0.7–3.1)
LYMPHOCYTES NFR BLD AUTO: 6.1 % (ref 19.6–45.3)
MCH RBC QN AUTO: 28.9 PG (ref 26.6–33)
MCHC RBC AUTO-ENTMCNC: 32.3 G/DL (ref 31.5–35.7)
MCV RBC AUTO: 89.7 FL (ref 79–97)
MONOCYTES # BLD AUTO: 0.57 10*3/MM3 (ref 0.1–0.9)
MONOCYTES NFR BLD AUTO: 7 % (ref 5–12)
NEUTROPHILS NFR BLD AUTO: 6.69 10*3/MM3 (ref 1.7–7)
NEUTROPHILS NFR BLD AUTO: 82.7 % (ref 42.7–76)
NRBC BLD AUTO-RTO: 0 /100 WBC (ref 0–0.2)
NT-PROBNP SERPL-MCNC: 7663 PG/ML (ref 0–1800)
PLATELET # BLD AUTO: 119 10*3/MM3 (ref 140–450)
PMV BLD AUTO: 11.9 FL (ref 6–12)
POTASSIUM SERPL-SCNC: 4.5 MMOL/L (ref 3.5–5.2)
PROT SERPL-MCNC: 5.6 G/DL (ref 6–8.5)
PROTHROMBIN TIME: 43.8 SECONDS (ref 12.5–15.2)
QT INTERVAL: 458 MS
QTC INTERVAL: 511 MS
RBC # BLD AUTO: 4.56 10*6/MM3 (ref 4.14–5.8)
SODIUM SERPL-SCNC: 136 MMOL/L (ref 136–145)
TROPONIN T % DELTA: 4
TROPONIN T NUMERIC DELTA: 3 NG/L
TROPONIN T SERPL HS-MCNC: 67 NG/L
WBC NRBC COR # BLD AUTO: 8.09 10*3/MM3 (ref 3.4–10.8)

## 2025-08-01 PROCEDURE — 99223 1ST HOSP IP/OBS HIGH 75: CPT

## 2025-08-01 PROCEDURE — 36415 COLL VENOUS BLD VENIPUNCTURE: CPT

## 2025-08-01 PROCEDURE — 99285 EMERGENCY DEPT VISIT HI MDM: CPT

## 2025-08-01 PROCEDURE — 71045 X-RAY EXAM CHEST 1 VIEW: CPT

## 2025-08-01 PROCEDURE — 83880 ASSAY OF NATRIURETIC PEPTIDE: CPT | Performed by: STUDENT IN AN ORGANIZED HEALTH CARE EDUCATION/TRAINING PROGRAM

## 2025-08-01 PROCEDURE — 93010 ELECTROCARDIOGRAM REPORT: CPT | Performed by: INTERNAL MEDICINE

## 2025-08-01 PROCEDURE — 80053 COMPREHEN METABOLIC PANEL: CPT | Performed by: STUDENT IN AN ORGANIZED HEALTH CARE EDUCATION/TRAINING PROGRAM

## 2025-08-01 PROCEDURE — 85610 PROTHROMBIN TIME: CPT

## 2025-08-01 PROCEDURE — 93005 ELECTROCARDIOGRAM TRACING: CPT | Performed by: STUDENT IN AN ORGANIZED HEALTH CARE EDUCATION/TRAINING PROGRAM

## 2025-08-01 PROCEDURE — 85025 COMPLETE CBC W/AUTO DIFF WBC: CPT | Performed by: STUDENT IN AN ORGANIZED HEALTH CARE EDUCATION/TRAINING PROGRAM

## 2025-08-01 PROCEDURE — 94726 PLETHYSMOGRAPHY LUNG VOLUMES: CPT

## 2025-08-01 PROCEDURE — 84484 ASSAY OF TROPONIN QUANT: CPT | Performed by: STUDENT IN AN ORGANIZED HEALTH CARE EDUCATION/TRAINING PROGRAM

## 2025-08-01 RX ORDER — SACUBITRIL AND VALSARTAN 24; 26 MG/1; MG/1
0.5 TABLET, FILM COATED ORAL 2 TIMES DAILY
Status: CANCELLED | OUTPATIENT
Start: 2025-08-01

## 2025-08-01 RX ORDER — BISACODYL 5 MG/1
5 TABLET, DELAYED RELEASE ORAL DAILY PRN
Status: DISCONTINUED | OUTPATIENT
Start: 2025-08-01 | End: 2025-08-05 | Stop reason: HOSPADM

## 2025-08-01 RX ORDER — FERROUS SULFATE 325(65) MG
325 TABLET ORAL DAILY
Status: DISCONTINUED | OUTPATIENT
Start: 2025-08-02 | End: 2025-08-05 | Stop reason: HOSPADM

## 2025-08-01 RX ORDER — BUMETANIDE 1 MG/1
1 TABLET ORAL 2 TIMES DAILY
Status: DISCONTINUED | OUTPATIENT
Start: 2025-08-01 | End: 2025-08-05 | Stop reason: HOSPADM

## 2025-08-01 RX ORDER — ACETAMINOPHEN 160 MG/5ML
650 SOLUTION ORAL EVERY 4 HOURS PRN
Status: DISCONTINUED | OUTPATIENT
Start: 2025-08-01 | End: 2025-08-05 | Stop reason: HOSPADM

## 2025-08-01 RX ORDER — EPLERENONE 25 MG/1
50 TABLET ORAL DAILY
Status: CANCELLED | OUTPATIENT
Start: 2025-08-01

## 2025-08-01 RX ORDER — POLYETHYLENE GLYCOL 3350 17 G/17G
17 POWDER, FOR SOLUTION ORAL DAILY PRN
Status: DISCONTINUED | OUTPATIENT
Start: 2025-08-01 | End: 2025-08-05 | Stop reason: HOSPADM

## 2025-08-01 RX ORDER — AMOXICILLIN 250 MG
2 CAPSULE ORAL 2 TIMES DAILY PRN
Status: DISCONTINUED | OUTPATIENT
Start: 2025-08-01 | End: 2025-08-05 | Stop reason: HOSPADM

## 2025-08-01 RX ORDER — SODIUM CHLORIDE 0.9 % (FLUSH) 0.9 %
10 SYRINGE (ML) INJECTION AS NEEDED
Status: DISCONTINUED | OUTPATIENT
Start: 2025-08-01 | End: 2025-08-05 | Stop reason: HOSPADM

## 2025-08-01 RX ORDER — EPLERENONE 25 MG/1
25 TABLET ORAL
Status: DISCONTINUED | OUTPATIENT
Start: 2025-08-02 | End: 2025-08-05 | Stop reason: HOSPADM

## 2025-08-01 RX ORDER — EPLERENONE 50 MG/1
50 TABLET ORAL 2 TIMES DAILY
Status: ON HOLD | COMMUNITY
End: 2025-08-01 | Stop reason: ALTCHOICE

## 2025-08-01 RX ORDER — SODIUM CHLORIDE 0.9 % (FLUSH) 0.9 %
10 SYRINGE (ML) INJECTION EVERY 12 HOURS SCHEDULED
Status: DISCONTINUED | OUTPATIENT
Start: 2025-08-01 | End: 2025-08-05 | Stop reason: HOSPADM

## 2025-08-01 RX ORDER — NITROGLYCERIN 0.4 MG/1
0.4 TABLET SUBLINGUAL
Status: DISCONTINUED | OUTPATIENT
Start: 2025-08-01 | End: 2025-08-05 | Stop reason: HOSPADM

## 2025-08-01 RX ORDER — BUMETANIDE 1 MG/1
1 TABLET ORAL 2 TIMES DAILY
Status: ON HOLD | COMMUNITY
End: 2025-08-05

## 2025-08-01 RX ORDER — BISACODYL 10 MG
10 SUPPOSITORY, RECTAL RECTAL DAILY PRN
Status: DISCONTINUED | OUTPATIENT
Start: 2025-08-01 | End: 2025-08-05 | Stop reason: HOSPADM

## 2025-08-01 RX ORDER — PREDNISONE 10 MG/1
5 TABLET ORAL DAILY
Status: DISCONTINUED | OUTPATIENT
Start: 2025-08-02 | End: 2025-08-05 | Stop reason: HOSPADM

## 2025-08-01 RX ORDER — POTASSIUM CHLORIDE 1500 MG/1
20 TABLET, EXTENDED RELEASE ORAL
Status: CANCELLED | OUTPATIENT
Start: 2025-08-01

## 2025-08-01 RX ORDER — PREDNISONE 5 MG/1
5 TABLET ORAL DAILY
COMMUNITY

## 2025-08-01 RX ORDER — ALLOPURINOL 300 MG/1
150 TABLET ORAL DAILY
Status: DISCONTINUED | OUTPATIENT
Start: 2025-08-02 | End: 2025-08-05 | Stop reason: HOSPADM

## 2025-08-01 RX ORDER — ACETAMINOPHEN 325 MG/1
650 TABLET ORAL EVERY 4 HOURS PRN
Status: DISCONTINUED | OUTPATIENT
Start: 2025-08-01 | End: 2025-08-05 | Stop reason: HOSPADM

## 2025-08-01 RX ORDER — WARFARIN SODIUM 4 MG/1
4 TABLET ORAL NIGHTLY
Status: DISCONTINUED | OUTPATIENT
Start: 2025-08-01 | End: 2025-08-01

## 2025-08-01 RX ORDER — SODIUM CHLORIDE 9 MG/ML
40 INJECTION, SOLUTION INTRAVENOUS AS NEEDED
Status: DISCONTINUED | OUTPATIENT
Start: 2025-08-01 | End: 2025-08-05 | Stop reason: HOSPADM

## 2025-08-01 RX ADMIN — BUMETANIDE 1 MG: 1 TABLET ORAL at 21:53

## 2025-08-01 RX ADMIN — ACETAMINOPHEN 650 MG: 325 TABLET ORAL at 21:53

## 2025-08-02 ENCOUNTER — APPOINTMENT (OUTPATIENT)
Dept: ULTRASOUND IMAGING | Facility: HOSPITAL | Age: 85
DRG: 280 | End: 2025-08-02
Payer: MEDICARE

## 2025-08-02 LAB
ANION GAP SERPL CALCULATED.3IONS-SCNC: 10.2 MMOL/L (ref 5–15)
BACTERIA UR QL AUTO: NORMAL /HPF
BASOPHILS # BLD AUTO: 0.04 10*3/MM3 (ref 0–0.2)
BASOPHILS NFR BLD AUTO: 0.5 % (ref 0–1.5)
BILIRUB UR QL STRIP: NEGATIVE
BUN SERPL-MCNC: 44.5 MG/DL (ref 8–23)
BUN/CREAT SERPL: 27.8 (ref 7–25)
CALCIUM SPEC-SCNC: 7.5 MG/DL (ref 8.6–10.5)
CHLORIDE SERPL-SCNC: 103 MMOL/L (ref 98–107)
CLARITY UR: CLEAR
CO2 SERPL-SCNC: 23.8 MMOL/L (ref 22–29)
COLOR UR: YELLOW
CREAT SERPL-MCNC: 1.6 MG/DL (ref 0.76–1.27)
DEPRECATED RDW RBC AUTO: 55 FL (ref 37–54)
EGFRCR SERPLBLD CKD-EPI 2021: 42.2 ML/MIN/1.73
EOSINOPHIL # BLD AUTO: 0.29 10*3/MM3 (ref 0–0.4)
EOSINOPHIL NFR BLD AUTO: 3.4 % (ref 0.3–6.2)
ERYTHROCYTE [DISTWIDTH] IN BLOOD BY AUTOMATED COUNT: 16.4 % (ref 12.3–15.4)
GLUCOSE SERPL-MCNC: 109 MG/DL (ref 65–99)
GLUCOSE UR STRIP-MCNC: ABNORMAL MG/DL
HCT VFR BLD AUTO: 38.4 % (ref 37.5–51)
HGB BLD-MCNC: 12.1 G/DL (ref 13–17.7)
HGB UR QL STRIP.AUTO: NEGATIVE
HYALINE CASTS UR QL AUTO: NORMAL /LPF
IMM GRANULOCYTES # BLD AUTO: 0.04 10*3/MM3 (ref 0–0.05)
IMM GRANULOCYTES NFR BLD AUTO: 0.5 % (ref 0–0.5)
INR PPP: 4.73 (ref 0.9–1.1)
KETONES UR QL STRIP: NEGATIVE
LEUKOCYTE ESTERASE UR QL STRIP.AUTO: NEGATIVE
LYMPHOCYTES # BLD AUTO: 0.6 10*3/MM3 (ref 0.7–3.1)
LYMPHOCYTES NFR BLD AUTO: 7 % (ref 19.6–45.3)
MAGNESIUM SERPL-MCNC: 2.2 MG/DL (ref 1.6–2.4)
MCH RBC QN AUTO: 28.9 PG (ref 26.6–33)
MCHC RBC AUTO-ENTMCNC: 31.5 G/DL (ref 31.5–35.7)
MCV RBC AUTO: 91.6 FL (ref 79–97)
MONOCYTES # BLD AUTO: 0.57 10*3/MM3 (ref 0.1–0.9)
MONOCYTES NFR BLD AUTO: 6.7 % (ref 5–12)
NEUTROPHILS NFR BLD AUTO: 7.02 10*3/MM3 (ref 1.7–7)
NEUTROPHILS NFR BLD AUTO: 81.9 % (ref 42.7–76)
NITRITE UR QL STRIP: NEGATIVE
NRBC BLD AUTO-RTO: 0 /100 WBC (ref 0–0.2)
PH UR STRIP.AUTO: <=5 [PH] (ref 5–8)
PHOSPHATE SERPL-MCNC: 3.7 MG/DL (ref 2.5–4.5)
PLATELET # BLD AUTO: 117 10*3/MM3 (ref 140–450)
PMV BLD AUTO: 12.1 FL (ref 6–12)
POTASSIUM SERPL-SCNC: 4.5 MMOL/L (ref 3.5–5.2)
PROT UR QL STRIP: ABNORMAL
PROTHROMBIN TIME: 47.8 SECONDS (ref 12.5–15.2)
RBC # BLD AUTO: 4.19 10*6/MM3 (ref 4.14–5.8)
RBC # UR STRIP: NORMAL /HPF
REF LAB TEST METHOD: NORMAL
SODIUM SERPL-SCNC: 137 MMOL/L (ref 136–145)
SP GR UR STRIP: 1.02 (ref 1–1.03)
SQUAMOUS #/AREA URNS HPF: NORMAL /HPF
UROBILINOGEN UR QL STRIP: ABNORMAL
WBC # UR STRIP: NORMAL /HPF
WBC NRBC COR # BLD AUTO: 8.56 10*3/MM3 (ref 3.4–10.8)

## 2025-08-02 PROCEDURE — 81001 URINALYSIS AUTO W/SCOPE: CPT | Performed by: INTERNAL MEDICINE

## 2025-08-02 PROCEDURE — 99232 SBSQ HOSP IP/OBS MODERATE 35: CPT

## 2025-08-02 PROCEDURE — 63710000001 PREDNISONE PER 5 MG

## 2025-08-02 PROCEDURE — 85025 COMPLETE CBC W/AUTO DIFF WBC: CPT

## 2025-08-02 PROCEDURE — 85610 PROTHROMBIN TIME: CPT

## 2025-08-02 PROCEDURE — 84100 ASSAY OF PHOSPHORUS: CPT

## 2025-08-02 PROCEDURE — 76775 US EXAM ABDO BACK WALL LIM: CPT | Performed by: RADIOLOGY

## 2025-08-02 PROCEDURE — 80053 COMPREHEN METABOLIC PANEL: CPT

## 2025-08-02 PROCEDURE — P9047 ALBUMIN (HUMAN), 25%, 50ML: HCPCS | Performed by: INTERNAL MEDICINE

## 2025-08-02 PROCEDURE — 76775 US EXAM ABDO BACK WALL LIM: CPT

## 2025-08-02 PROCEDURE — 25010000002 DOBUTAMINE PER 250 MG: Performed by: INTERNAL MEDICINE

## 2025-08-02 PROCEDURE — 25010000002 ALBUMIN HUMAN 25% PER 50 ML: Performed by: INTERNAL MEDICINE

## 2025-08-02 PROCEDURE — 83735 ASSAY OF MAGNESIUM: CPT

## 2025-08-02 RX ORDER — ALBUMIN (HUMAN) 12.5 G/50ML
25 SOLUTION INTRAVENOUS ONCE
Status: COMPLETED | OUTPATIENT
Start: 2025-08-02 | End: 2025-08-02

## 2025-08-02 RX ORDER — DOBUTAMINE HYDROCHLORIDE 200 MG/100ML
2.5 INJECTION INTRAVENOUS CONTINUOUS
Status: DISCONTINUED | OUTPATIENT
Start: 2025-08-02 | End: 2025-08-04

## 2025-08-02 RX ORDER — CYCLOBENZAPRINE HCL 10 MG
5 TABLET ORAL ONCE AS NEEDED
Status: DISCONTINUED | OUTPATIENT
Start: 2025-08-02 | End: 2025-08-05 | Stop reason: HOSPADM

## 2025-08-02 RX ADMIN — Medication 10 ML: at 21:31

## 2025-08-02 RX ADMIN — EMPAGLIFLOZIN 10 MG: 10 TABLET, FILM COATED ORAL at 09:29

## 2025-08-02 RX ADMIN — ALBUMIN (HUMAN) 25 G: 0.25 INJECTION, SOLUTION INTRAVENOUS at 11:49

## 2025-08-02 RX ADMIN — DOBUTAMINE HYDROCHLORIDE 2.5 MCG/KG/MIN: 200 INJECTION INTRAVENOUS at 17:00

## 2025-08-02 RX ADMIN — ALLOPURINOL 150 MG: 300 TABLET ORAL at 09:29

## 2025-08-02 RX ADMIN — PREDNISONE 5 MG: 5 TABLET ORAL at 09:29

## 2025-08-02 RX ADMIN — FERROUS SULFATE TAB 325 MG (65 MG ELEMENTAL FE) 325 MG: 325 (65 FE) TAB at 09:29

## 2025-08-02 RX ADMIN — Medication 10 ML: at 09:30

## 2025-08-03 LAB
ALBUMIN SERPL-MCNC: 3.2 G/DL (ref 3.5–5.2)
ALBUMIN/GLOB SERPL: 1.7 G/DL
ALP SERPL-CCNC: 65 U/L (ref 39–117)
ALT SERPL W P-5'-P-CCNC: 11 U/L (ref 1–41)
ANION GAP SERPL CALCULATED.3IONS-SCNC: 11.5 MMOL/L (ref 5–15)
AST SERPL-CCNC: 17 U/L (ref 1–40)
BASOPHILS # BLD AUTO: 0.04 10*3/MM3 (ref 0–0.2)
BASOPHILS NFR BLD AUTO: 0.5 % (ref 0–1.5)
BILIRUB SERPL-MCNC: 0.7 MG/DL (ref 0–1.2)
BUN SERPL-MCNC: 48.5 MG/DL (ref 8–23)
BUN/CREAT SERPL: 33.4 (ref 7–25)
CALCIUM SPEC-SCNC: 7.9 MG/DL (ref 8.6–10.5)
CHLORIDE SERPL-SCNC: 102 MMOL/L (ref 98–107)
CO2 SERPL-SCNC: 21.5 MMOL/L (ref 22–29)
CREAT SERPL-MCNC: 1.45 MG/DL (ref 0.76–1.27)
DEPRECATED RDW RBC AUTO: 54.1 FL (ref 37–54)
EGFRCR SERPLBLD CKD-EPI 2021: 47.5 ML/MIN/1.73
EOSINOPHIL # BLD AUTO: 0.31 10*3/MM3 (ref 0–0.4)
EOSINOPHIL NFR BLD AUTO: 4.2 % (ref 0.3–6.2)
ERYTHROCYTE [DISTWIDTH] IN BLOOD BY AUTOMATED COUNT: 16.4 % (ref 12.3–15.4)
GLOBULIN UR ELPH-MCNC: 1.9 GM/DL
GLUCOSE SERPL-MCNC: 109 MG/DL (ref 65–99)
HCT VFR BLD AUTO: 37 % (ref 37.5–51)
HGB BLD-MCNC: 11.9 G/DL (ref 13–17.7)
IMM GRANULOCYTES # BLD AUTO: 0.03 10*3/MM3 (ref 0–0.05)
IMM GRANULOCYTES NFR BLD AUTO: 0.4 % (ref 0–0.5)
INR PPP: 3.59 (ref 0.9–1.1)
LYMPHOCYTES # BLD AUTO: 0.67 10*3/MM3 (ref 0.7–3.1)
LYMPHOCYTES NFR BLD AUTO: 9.1 % (ref 19.6–45.3)
MCH RBC QN AUTO: 28.6 PG (ref 26.6–33)
MCHC RBC AUTO-ENTMCNC: 32.2 G/DL (ref 31.5–35.7)
MCV RBC AUTO: 88.9 FL (ref 79–97)
MONOCYTES # BLD AUTO: 0.7 10*3/MM3 (ref 0.1–0.9)
MONOCYTES NFR BLD AUTO: 9.5 % (ref 5–12)
NEUTROPHILS NFR BLD AUTO: 5.63 10*3/MM3 (ref 1.7–7)
NEUTROPHILS NFR BLD AUTO: 76.3 % (ref 42.7–76)
NRBC BLD AUTO-RTO: 0 /100 WBC (ref 0–0.2)
PLATELET # BLD AUTO: 119 10*3/MM3 (ref 140–450)
PMV BLD AUTO: 12.6 FL (ref 6–12)
POTASSIUM SERPL-SCNC: 4 MMOL/L (ref 3.5–5.2)
PROT SERPL-MCNC: 5.1 G/DL (ref 6–8.5)
PROTHROMBIN TIME: 38.4 SECONDS (ref 12.5–15.2)
RBC # BLD AUTO: 4.16 10*6/MM3 (ref 4.14–5.8)
SODIUM SERPL-SCNC: 135 MMOL/L (ref 136–145)
WBC NRBC COR # BLD AUTO: 7.38 10*3/MM3 (ref 3.4–10.8)

## 2025-08-03 PROCEDURE — 25010000002 ALBUMIN HUMAN 25% PER 50 ML: Performed by: INTERNAL MEDICINE

## 2025-08-03 PROCEDURE — 99232 SBSQ HOSP IP/OBS MODERATE 35: CPT

## 2025-08-03 PROCEDURE — 25010000002 DOBUTAMINE PER 250 MG: Performed by: INTERNAL MEDICINE

## 2025-08-03 PROCEDURE — 25010000002 FUROSEMIDE PER 20 MG: Performed by: INTERNAL MEDICINE

## 2025-08-03 PROCEDURE — P9047 ALBUMIN (HUMAN), 25%, 50ML: HCPCS | Performed by: INTERNAL MEDICINE

## 2025-08-03 PROCEDURE — 63710000001 PREDNISONE PER 5 MG

## 2025-08-03 RX ORDER — ALBUMIN (HUMAN) 12.5 G/50ML
25 SOLUTION INTRAVENOUS ONCE
Status: COMPLETED | OUTPATIENT
Start: 2025-08-03 | End: 2025-08-03

## 2025-08-03 RX ORDER — WARFARIN SODIUM 2 MG/1
2 TABLET ORAL
Status: COMPLETED | OUTPATIENT
Start: 2025-08-03 | End: 2025-08-03

## 2025-08-03 RX ADMIN — FUROSEMIDE 100 MG: 10 INJECTION, SOLUTION INTRAVENOUS at 12:33

## 2025-08-03 RX ADMIN — Medication 10 ML: at 09:25

## 2025-08-03 RX ADMIN — DOBUTAMINE HYDROCHLORIDE 2.5 MCG/KG/MIN: 200 INJECTION INTRAVENOUS at 22:45

## 2025-08-03 RX ADMIN — WARFARIN SODIUM 2 MG: 2 TABLET ORAL at 17:54

## 2025-08-03 RX ADMIN — FERROUS SULFATE TAB 325 MG (65 MG ELEMENTAL FE) 325 MG: 325 (65 FE) TAB at 09:25

## 2025-08-03 RX ADMIN — PREDNISONE 5 MG: 5 TABLET ORAL at 09:25

## 2025-08-03 RX ADMIN — Medication 10 ML: at 20:17

## 2025-08-03 RX ADMIN — ALBUMIN (HUMAN) 25 G: 0.25 INJECTION, SOLUTION INTRAVENOUS at 11:28

## 2025-08-03 RX ADMIN — EMPAGLIFLOZIN 10 MG: 10 TABLET, FILM COATED ORAL at 09:25

## 2025-08-03 RX ADMIN — ALLOPURINOL 150 MG: 300 TABLET ORAL at 09:25

## 2025-08-04 LAB
ACANTHOCYTES BLD QL SMEAR: ABNORMAL
ANION GAP SERPL CALCULATED.3IONS-SCNC: 14 MMOL/L (ref 5–15)
ANISOCYTOSIS BLD QL: ABNORMAL
BUN SERPL-MCNC: 45.3 MG/DL (ref 8–23)
BUN/CREAT SERPL: 31.5 (ref 7–25)
CALCIUM SPEC-SCNC: 7.6 MG/DL (ref 8.6–10.5)
CHLORIDE SERPL-SCNC: 101 MMOL/L (ref 98–107)
CO2 SERPL-SCNC: 21 MMOL/L (ref 22–29)
CREAT SERPL-MCNC: 1.44 MG/DL (ref 0.76–1.27)
DEPRECATED RDW RBC AUTO: 53.6 FL (ref 37–54)
EGFRCR SERPLBLD CKD-EPI 2021: 47.9 ML/MIN/1.73
EOSINOPHIL # BLD MANUAL: 0.3 10*3/MM3 (ref 0–0.4)
EOSINOPHIL NFR BLD MANUAL: 4 % (ref 0.3–6.2)
ERYTHROCYTE [DISTWIDTH] IN BLOOD BY AUTOMATED COUNT: 16.3 % (ref 12.3–15.4)
GLUCOSE SERPL-MCNC: 107 MG/DL (ref 65–99)
HCT VFR BLD AUTO: 36.5 % (ref 37.5–51)
HGB BLD-MCNC: 11.7 G/DL (ref 13–17.7)
INR PPP: 2.56 (ref 0.9–1.1)
LARGE PLATELETS: ABNORMAL
LYMPHOCYTES # BLD MANUAL: 0.83 10*3/MM3 (ref 0.7–3.1)
LYMPHOCYTES NFR BLD MANUAL: 2 % (ref 5–12)
MCH RBC QN AUTO: 29 PG (ref 26.6–33)
MCHC RBC AUTO-ENTMCNC: 32.1 G/DL (ref 31.5–35.7)
MCV RBC AUTO: 90.6 FL (ref 79–97)
METAMYELOCYTES NFR BLD MANUAL: 2 % (ref 0–0)
MONOCYTES # BLD: 0.15 10*3/MM3 (ref 0.1–0.9)
NEUTROPHILS # BLD AUTO: 6.08 10*3/MM3 (ref 1.7–7)
NEUTROPHILS NFR BLD MANUAL: 81 % (ref 42.7–76)
PLATELET # BLD AUTO: 125 10*3/MM3 (ref 140–450)
PMV BLD AUTO: 11.9 FL (ref 6–12)
POTASSIUM SERPL-SCNC: 3.6 MMOL/L (ref 3.5–5.2)
POTASSIUM SERPL-SCNC: 3.8 MMOL/L (ref 3.5–5.2)
PROTHROMBIN TIME: 29.3 SECONDS (ref 12.5–15.2)
RBC # BLD AUTO: 4.03 10*6/MM3 (ref 4.14–5.8)
SODIUM SERPL-SCNC: 136 MMOL/L (ref 136–145)
VARIANT LYMPHS NFR BLD MANUAL: 11 % (ref 19.6–45.3)
WBC NRBC COR # BLD AUTO: 7.5 10*3/MM3 (ref 3.4–10.8)

## 2025-08-04 PROCEDURE — 99232 SBSQ HOSP IP/OBS MODERATE 35: CPT

## 2025-08-04 PROCEDURE — 84132 ASSAY OF SERUM POTASSIUM: CPT | Performed by: INTERNAL MEDICINE

## 2025-08-04 PROCEDURE — 85007 BL SMEAR W/DIFF WBC COUNT: CPT

## 2025-08-04 PROCEDURE — 85610 PROTHROMBIN TIME: CPT

## 2025-08-04 PROCEDURE — 63710000001 PREDNISONE PER 5 MG

## 2025-08-04 PROCEDURE — 80048 BASIC METABOLIC PNL TOTAL CA: CPT

## 2025-08-04 PROCEDURE — 97162 PT EVAL MOD COMPLEX 30 MIN: CPT

## 2025-08-04 PROCEDURE — 25010000002 FUROSEMIDE PER 20 MG: Performed by: INTERNAL MEDICINE

## 2025-08-04 PROCEDURE — 83735 ASSAY OF MAGNESIUM: CPT | Performed by: INTERNAL MEDICINE

## 2025-08-04 PROCEDURE — P9047 ALBUMIN (HUMAN), 25%, 50ML: HCPCS | Performed by: INTERNAL MEDICINE

## 2025-08-04 PROCEDURE — 85027 COMPLETE CBC AUTOMATED: CPT

## 2025-08-04 PROCEDURE — 85025 COMPLETE CBC W/AUTO DIFF WBC: CPT

## 2025-08-04 PROCEDURE — 25010000002 ALBUMIN HUMAN 25% PER 50 ML: Performed by: INTERNAL MEDICINE

## 2025-08-04 RX ORDER — WARFARIN SODIUM 4 MG/1
4 TABLET ORAL
Status: COMPLETED | OUTPATIENT
Start: 2025-08-04 | End: 2025-08-04

## 2025-08-04 RX ORDER — POTASSIUM CHLORIDE 1500 MG/1
40 TABLET, EXTENDED RELEASE ORAL EVERY 4 HOURS
Status: DISCONTINUED | OUTPATIENT
Start: 2025-08-04 | End: 2025-08-04

## 2025-08-04 RX ORDER — POTASSIUM CHLORIDE 1500 MG/1
40 TABLET, EXTENDED RELEASE ORAL EVERY 4 HOURS
Status: COMPLETED | OUTPATIENT
Start: 2025-08-04 | End: 2025-08-04

## 2025-08-04 RX ORDER — ALBUMIN (HUMAN) 12.5 G/50ML
25 SOLUTION INTRAVENOUS ONCE
Status: COMPLETED | OUTPATIENT
Start: 2025-08-04 | End: 2025-08-04

## 2025-08-04 RX ORDER — DOBUTAMINE HYDROCHLORIDE 200 MG/100ML
5 INJECTION INTRAVENOUS CONTINUOUS
Status: DISPENSED | OUTPATIENT
Start: 2025-08-04 | End: 2025-08-05

## 2025-08-04 RX ADMIN — POTASSIUM CHLORIDE 40 MEQ: 1500 TABLET, EXTENDED RELEASE ORAL at 06:14

## 2025-08-04 RX ADMIN — ALBUMIN (HUMAN) 25 G: 0.25 INJECTION, SOLUTION INTRAVENOUS at 09:33

## 2025-08-04 RX ADMIN — WARFARIN SODIUM 4 MG: 4 TABLET ORAL at 13:23

## 2025-08-04 RX ADMIN — POTASSIUM CHLORIDE 40 MEQ: 1500 TABLET, EXTENDED RELEASE ORAL at 09:32

## 2025-08-04 RX ADMIN — Medication 10 ML: at 20:04

## 2025-08-04 RX ADMIN — PREDNISONE 5 MG: 5 TABLET ORAL at 09:32

## 2025-08-04 RX ADMIN — EMPAGLIFLOZIN 10 MG: 10 TABLET, FILM COATED ORAL at 09:32

## 2025-08-04 RX ADMIN — ALLOPURINOL 150 MG: 300 TABLET ORAL at 09:32

## 2025-08-04 RX ADMIN — FERROUS SULFATE TAB 325 MG (65 MG ELEMENTAL FE) 325 MG: 325 (65 FE) TAB at 09:32

## 2025-08-04 RX ADMIN — FUROSEMIDE 100 MG: 10 INJECTION, SOLUTION INTRAVENOUS at 10:12

## 2025-08-04 RX ADMIN — Medication 10 ML: at 09:33

## 2025-08-05 ENCOUNTER — READMISSION MANAGEMENT (OUTPATIENT)
Dept: CALL CENTER | Facility: HOSPITAL | Age: 85
End: 2025-08-05
Payer: MEDICARE

## 2025-08-05 VITALS
RESPIRATION RATE: 18 BRPM | HEIGHT: 70 IN | BODY MASS INDEX: 28.78 KG/M2 | HEART RATE: 79 BPM | TEMPERATURE: 98.1 F | WEIGHT: 201.06 LBS | SYSTOLIC BLOOD PRESSURE: 140 MMHG | DIASTOLIC BLOOD PRESSURE: 73 MMHG | OXYGEN SATURATION: 99 %

## 2025-08-05 PROBLEM — I50.23 ACUTE ON CHRONIC HFREF (HEART FAILURE WITH REDUCED EJECTION FRACTION): Status: RESOLVED | Noted: 2025-08-01 | Resolved: 2025-08-05

## 2025-08-05 LAB
ANION GAP SERPL CALCULATED.3IONS-SCNC: 11.6 MMOL/L (ref 5–15)
BUN SERPL-MCNC: 41.8 MG/DL (ref 8–23)
BUN/CREAT SERPL: 31 (ref 7–25)
CALCIUM SPEC-SCNC: 7.4 MG/DL (ref 8.6–10.5)
CHLORIDE SERPL-SCNC: 102 MMOL/L (ref 98–107)
CO2 SERPL-SCNC: 21.4 MMOL/L (ref 22–29)
CREAT SERPL-MCNC: 1.35 MG/DL (ref 0.76–1.27)
DEPRECATED RDW RBC AUTO: 56.9 FL (ref 37–54)
EGFRCR SERPLBLD CKD-EPI 2021: 51.8 ML/MIN/1.73
ERYTHROCYTE [DISTWIDTH] IN BLOOD BY AUTOMATED COUNT: 16.4 % (ref 12.3–15.4)
GLUCOSE SERPL-MCNC: 132 MG/DL (ref 65–99)
HCT VFR BLD AUTO: 37.3 % (ref 37.5–51)
HGB BLD-MCNC: 11.4 G/DL (ref 13–17.7)
INR PPP: 2.23 (ref 0.9–1.1)
MAGNESIUM SERPL-MCNC: 2.6 MG/DL (ref 1.6–2.4)
MCH RBC QN AUTO: 28.9 PG (ref 26.6–33)
MCHC RBC AUTO-ENTMCNC: 30.6 G/DL (ref 31.5–35.7)
MCV RBC AUTO: 94.4 FL (ref 79–97)
PLATELET # BLD AUTO: 133 10*3/MM3 (ref 140–450)
PMV BLD AUTO: 12.2 FL (ref 6–12)
POTASSIUM SERPL-SCNC: 3.8 MMOL/L (ref 3.5–5.2)
PROTHROMBIN TIME: 26.2 SECONDS (ref 12.5–15.2)
RBC # BLD AUTO: 3.95 10*6/MM3 (ref 4.14–5.8)
SODIUM SERPL-SCNC: 135 MMOL/L (ref 136–145)
WBC NRBC COR # BLD AUTO: 6.58 10*3/MM3 (ref 3.4–10.8)

## 2025-08-05 PROCEDURE — 25010000002 ENOXAPARIN PER 10 MG: Performed by: INTERNAL MEDICINE

## 2025-08-05 PROCEDURE — 25010000002 DOBUTAMINE PER 250 MG: Performed by: INTERNAL MEDICINE

## 2025-08-05 PROCEDURE — 63710000001 PREDNISONE PER 5 MG

## 2025-08-05 PROCEDURE — 99239 HOSP IP/OBS DSCHRG MGMT >30: CPT

## 2025-08-05 RX ORDER — BUMETANIDE 1 MG/1
1 TABLET ORAL DAILY
Start: 2025-08-05

## 2025-08-05 RX ORDER — WARFARIN SODIUM 5 MG/1
5 TABLET ORAL
Status: COMPLETED | OUTPATIENT
Start: 2025-08-05 | End: 2025-08-05

## 2025-08-05 RX ORDER — BUMETANIDE 1 MG/1
1 TABLET ORAL DAILY
Status: DISCONTINUED | OUTPATIENT
Start: 2025-08-05 | End: 2025-08-05 | Stop reason: HOSPADM

## 2025-08-05 RX ORDER — ENOXAPARIN SODIUM 100 MG/ML
1 INJECTION SUBCUTANEOUS EVERY 12 HOURS
Qty: 14 ML | Refills: 0 | Status: SHIPPED | OUTPATIENT
Start: 2025-08-05 | End: 2025-08-12

## 2025-08-05 RX ORDER — ENOXAPARIN SODIUM 100 MG/ML
1 INJECTION SUBCUTANEOUS EVERY 12 HOURS
Status: DISCONTINUED | OUTPATIENT
Start: 2025-08-05 | End: 2025-08-05 | Stop reason: HOSPADM

## 2025-08-05 RX ADMIN — ALLOPURINOL 150 MG: 300 TABLET ORAL at 09:19

## 2025-08-05 RX ADMIN — WARFARIN SODIUM 5 MG: 5 TABLET ORAL at 09:19

## 2025-08-05 RX ADMIN — PREDNISONE 5 MG: 5 TABLET ORAL at 09:18

## 2025-08-05 RX ADMIN — EMPAGLIFLOZIN 10 MG: 10 TABLET, FILM COATED ORAL at 09:18

## 2025-08-05 RX ADMIN — DOBUTAMINE HYDROCHLORIDE 5 MCG/KG/MIN: 200 INJECTION INTRAVENOUS at 00:00

## 2025-08-05 RX ADMIN — BUMETANIDE 1 MG: 1 TABLET ORAL at 11:54

## 2025-08-05 RX ADMIN — ENOXAPARIN SODIUM 90 MG: 100 INJECTION SUBCUTANEOUS at 09:19

## 2025-08-05 RX ADMIN — FERROUS SULFATE TAB 325 MG (65 MG ELEMENTAL FE) 325 MG: 325 (65 FE) TAB at 09:19

## 2025-08-05 RX ADMIN — Medication 10 ML: at 09:20

## 2025-08-06 ENCOUNTER — TELEPHONE (OUTPATIENT)
Dept: TELEMETRY | Facility: HOSPITAL | Age: 85
End: 2025-08-06
Payer: MEDICARE

## 2025-08-06 ENCOUNTER — READMISSION MANAGEMENT (OUTPATIENT)
Dept: CALL CENTER | Facility: HOSPITAL | Age: 85
End: 2025-08-06
Payer: MEDICARE

## 2025-08-06 LAB
MC_CV_MDC_IDC_RATE_1: 180
MC_CV_MDC_IDC_RATE_1: 220
MC_CV_MDC_IDC_SHOCK_MEASURED_IMPEDANCE: 52
MC_CV_MDC_IDC_THERAPIES: NORMAL
MC_CV_MDC_IDC_THERAPIES: NORMAL
MC_CV_MDC_IDC_ZONE_ID: 1
MC_CV_MDC_IDC_ZONE_ID: 2
MDC_IDC_MSMT_BATTERY_REMAINING_LONGEVITY: 114 MO
MDC_IDC_MSMT_BATTERY_REMAINING_PERCENTAGE: 100 %
MDC_IDC_MSMT_BATTERY_STATUS: NORMAL
MDC_IDC_MSMT_CAP_CHARGE_TIME: 10.5
MDC_IDC_MSMT_LEADCHNL_LV_DTM: NORMAL
MDC_IDC_MSMT_LEADCHNL_LV_IMPEDANCE_VALUE: 475
MDC_IDC_MSMT_LEADCHNL_LV_PACING_THRESHOLD_POLARITY: NORMAL
MDC_IDC_MSMT_LEADCHNL_LV_SENSING_INTR_AMPL: 19.1
MDC_IDC_MSMT_LEADCHNL_RA_DTM: NORMAL
MDC_IDC_MSMT_LEADCHNL_RA_IMPEDANCE_VALUE: 383
MDC_IDC_MSMT_LEADCHNL_RA_PACING_THRESHOLD_POLARITY: NORMAL
MDC_IDC_MSMT_LEADCHNL_RV_DTM: NORMAL
MDC_IDC_MSMT_LEADCHNL_RV_IMPEDANCE_VALUE: 366
MDC_IDC_MSMT_LEADCHNL_RV_PACING_THRESHOLD_POLARITY: NORMAL
MDC_IDC_MSMT_LEADCHNL_RV_SENSING_INTR_AMPL: 9.7
MDC_IDC_PG_IMPLANT_DTM: NORMAL
MDC_IDC_PG_MFG: NORMAL
MDC_IDC_PG_MODEL: NORMAL
MDC_IDC_PG_SERIAL: NORMAL
MDC_IDC_PG_TYPE: NORMAL
MDC_IDC_SESS_DTM: NORMAL
MDC_IDC_SESS_TYPE: NORMAL
MDC_IDC_SET_BRADY_AT_MODE_SWITCH_RATE: 170
MDC_IDC_SET_BRADY_LOWRATE: 75
MDC_IDC_SET_BRADY_MAX_SENSOR_RATE: 130
MDC_IDC_SET_BRADY_MODE: NORMAL
MDC_IDC_SET_CRT_LVRV_DELAY: 10
MDC_IDC_SET_CRT_PACED_CHAMBERS: NORMAL
MDC_IDC_SET_LEADCHNL_LV_PACING_AMPLITUDE: 2.2
MDC_IDC_SET_LEADCHNL_LV_PACING_PULSEWIDTH: 0.4
MDC_IDC_SET_LEADCHNL_RA_PACING_POLARITY: NORMAL
MDC_IDC_SET_LEADCHNL_RA_SENSING_POLARITY: NORMAL
MDC_IDC_SET_LEADCHNL_RA_SENSING_SENSITIVITY: 0.25
MDC_IDC_SET_LEADCHNL_RV_PACING_AMPLITUDE: 2
MDC_IDC_SET_LEADCHNL_RV_PACING_POLARITY: NORMAL
MDC_IDC_SET_LEADCHNL_RV_PACING_PULSEWIDTH: 0.4
MDC_IDC_SET_LEADCHNL_RV_SENSING_POLARITY: NORMAL
MDC_IDC_SET_LEADCHNL_RV_SENSING_SENSITIVITY: 0.6
MDC_IDC_SET_ZONE_STATUS: NORMAL
MDC_IDC_SET_ZONE_STATUS: NORMAL
MDC_IDC_SET_ZONE_TYPE: NORMAL
MDC_IDC_SET_ZONE_TYPE: NORMAL
MDC_IDC_STAT_BRADY_RA_PERCENT_PACED: 0
MDC_IDC_STAT_BRADY_RV_PERCENT_PACED: 87
MDC_IDC_STAT_CRT_LV_PERCENT_PACED: 86
MDC_IDC_STAT_TACHYTHERAPY_ATP_DELIVERED_RECENT: 2
MDC_IDC_STAT_TACHYTHERAPY_SHOCKS_ABORTED_RECENT: 0
MDC_IDC_STAT_TACHYTHERAPY_SHOCKS_DELIVERED_RECENT: 0

## 2025-08-07 ENCOUNTER — DISEASE STATE MANAGEMENT VISIT (OUTPATIENT)
Dept: PHARMACY | Facility: HOSPITAL | Age: 85
End: 2025-08-07
Payer: MEDICARE

## 2025-08-08 RX ORDER — BUMETANIDE 1 MG/1
2 TABLET ORAL 2 TIMES DAILY
Qty: 360 TABLET | Refills: 3 | OUTPATIENT
Start: 2025-08-08

## 2025-08-11 ENCOUNTER — TRANSCRIBE ORDERS (OUTPATIENT)
Dept: ADMINISTRATIVE | Facility: HOSPITAL | Age: 85
End: 2025-08-11
Payer: MEDICARE

## 2025-08-11 ENCOUNTER — HOSPITAL ENCOUNTER (OUTPATIENT)
Dept: INFUSION THERAPY | Facility: HOSPITAL | Age: 85
Discharge: HOME | End: 2025-08-11
Admitting: INTERNAL MEDICINE
Payer: MEDICARE

## 2025-08-11 VITALS
RESPIRATION RATE: 18 BRPM | HEART RATE: 74 BPM | SYSTOLIC BLOOD PRESSURE: 95 MMHG | OXYGEN SATURATION: 99 % | DIASTOLIC BLOOD PRESSURE: 45 MMHG

## 2025-08-11 DIAGNOSIS — R13.13 PHARYNGEAL DYSPHAGIA: Primary | ICD-10-CM

## 2025-08-11 DIAGNOSIS — K74.69: Primary | ICD-10-CM

## 2025-08-11 PROCEDURE — 96365 THER/PROPH/DIAG IV INF INIT: CPT

## 2025-08-11 PROCEDURE — 25010000002 FUROSEMIDE PER 20 MG: Performed by: INTERNAL MEDICINE

## 2025-08-11 PROCEDURE — P9047 ALBUMIN (HUMAN), 25%, 50ML: HCPCS | Performed by: INTERNAL MEDICINE

## 2025-08-11 PROCEDURE — 25010000002 ALBUMIN HUMAN 25% PER 50 ML: Performed by: INTERNAL MEDICINE

## 2025-08-11 PROCEDURE — 96375 TX/PRO/DX INJ NEW DRUG ADDON: CPT

## 2025-08-11 RX ORDER — ALBUMIN (HUMAN) 12.5 G/50ML
25 SOLUTION INTRAVENOUS
Status: COMPLETED | OUTPATIENT
Start: 2025-08-11 | End: 2025-08-11

## 2025-08-11 RX ORDER — FUROSEMIDE 10 MG/ML
40 INJECTION INTRAMUSCULAR; INTRAVENOUS ONCE
Status: COMPLETED | OUTPATIENT
Start: 2025-08-11 | End: 2025-08-11

## 2025-08-11 RX ORDER — FUROSEMIDE 10 MG/ML
40 INJECTION INTRAMUSCULAR; INTRAVENOUS ONCE
Status: CANCELLED
Start: 2025-08-18 | End: 2025-08-18

## 2025-08-11 RX ORDER — ALBUMIN (HUMAN) 12.5 G/50ML
25 SOLUTION INTRAVENOUS
Status: CANCELLED
Start: 2025-08-18

## 2025-08-11 RX ADMIN — FUROSEMIDE 40 MG: 10 INJECTION, SOLUTION INTRAMUSCULAR; INTRAVENOUS at 12:45

## 2025-08-11 RX ADMIN — ALBUMIN (HUMAN) 25 G: 0.25 INJECTION, SOLUTION INTRAVENOUS at 12:19

## 2025-08-11 RX ADMIN — ALBUMIN (HUMAN) 25 G: 0.25 INJECTION, SOLUTION INTRAVENOUS at 12:58

## 2025-08-14 LAB
MC_CV_MDC_IDC_RATE_1: 180
MC_CV_MDC_IDC_RATE_1: 220
MC_CV_MDC_IDC_SHOCK_MEASURED_IMPEDANCE: 53
MC_CV_MDC_IDC_THERAPIES: NORMAL
MC_CV_MDC_IDC_THERAPIES: NORMAL
MC_CV_MDC_IDC_ZONE_ID: 1
MC_CV_MDC_IDC_ZONE_ID: 2
MDC_IDC_MSMT_BATTERY_REMAINING_LONGEVITY: 108 MO
MDC_IDC_MSMT_BATTERY_REMAINING_PERCENTAGE: 100 %
MDC_IDC_MSMT_BATTERY_STATUS: NORMAL
MDC_IDC_MSMT_CAP_CHARGE_TIME: 10.5
MDC_IDC_MSMT_LEADCHNL_LV_DTM: NORMAL
MDC_IDC_MSMT_LEADCHNL_LV_IMPEDANCE_VALUE: 491
MDC_IDC_MSMT_LEADCHNL_LV_PACING_THRESHOLD_POLARITY: NORMAL
MDC_IDC_MSMT_LEADCHNL_RA_DTM: NORMAL
MDC_IDC_MSMT_LEADCHNL_RA_IMPEDANCE_VALUE: 374
MDC_IDC_MSMT_LEADCHNL_RA_PACING_THRESHOLD_POLARITY: NORMAL
MDC_IDC_MSMT_LEADCHNL_RV_DTM: NORMAL
MDC_IDC_MSMT_LEADCHNL_RV_IMPEDANCE_VALUE: 365
MDC_IDC_MSMT_LEADCHNL_RV_PACING_THRESHOLD_POLARITY: NORMAL
MDC_IDC_PG_IMPLANT_DTM: NORMAL
MDC_IDC_PG_MFG: NORMAL
MDC_IDC_PG_MODEL: NORMAL
MDC_IDC_PG_SERIAL: NORMAL
MDC_IDC_PG_TYPE: NORMAL
MDC_IDC_SESS_DTM: NORMAL
MDC_IDC_SESS_TYPE: NORMAL
MDC_IDC_SET_BRADY_AT_MODE_SWITCH_RATE: 170
MDC_IDC_SET_BRADY_LOWRATE: 75
MDC_IDC_SET_BRADY_MAX_SENSOR_RATE: 130
MDC_IDC_SET_BRADY_MODE: NORMAL
MDC_IDC_SET_CRT_LVRV_DELAY: 10
MDC_IDC_SET_CRT_PACED_CHAMBERS: NORMAL
MDC_IDC_SET_LEADCHNL_LV_PACING_AMPLITUDE: 2.2
MDC_IDC_SET_LEADCHNL_LV_PACING_PULSEWIDTH: 0.4
MDC_IDC_SET_LEADCHNL_RA_PACING_POLARITY: NORMAL
MDC_IDC_SET_LEADCHNL_RA_SENSING_POLARITY: NORMAL
MDC_IDC_SET_LEADCHNL_RA_SENSING_SENSITIVITY: 0.25
MDC_IDC_SET_LEADCHNL_RV_PACING_AMPLITUDE: 2
MDC_IDC_SET_LEADCHNL_RV_PACING_POLARITY: NORMAL
MDC_IDC_SET_LEADCHNL_RV_PACING_PULSEWIDTH: 0.4
MDC_IDC_SET_LEADCHNL_RV_SENSING_POLARITY: NORMAL
MDC_IDC_SET_LEADCHNL_RV_SENSING_SENSITIVITY: 0.6
MDC_IDC_SET_ZONE_STATUS: NORMAL
MDC_IDC_SET_ZONE_STATUS: NORMAL
MDC_IDC_SET_ZONE_TYPE: NORMAL
MDC_IDC_SET_ZONE_TYPE: NORMAL
MDC_IDC_STAT_BRADY_RA_PERCENT_PACED: 0
MDC_IDC_STAT_BRADY_RV_PERCENT_PACED: 87
MDC_IDC_STAT_CRT_LV_PERCENT_PACED: 87
MDC_IDC_STAT_TACHYTHERAPY_ATP_DELIVERED_RECENT: 2
MDC_IDC_STAT_TACHYTHERAPY_SHOCKS_ABORTED_RECENT: 0
MDC_IDC_STAT_TACHYTHERAPY_SHOCKS_DELIVERED_RECENT: 0

## 2025-08-18 ENCOUNTER — HOSPITAL ENCOUNTER (OUTPATIENT)
Dept: INFUSION THERAPY | Facility: HOSPITAL | Age: 85
Discharge: HOME | End: 2025-08-18
Admitting: INTERNAL MEDICINE
Payer: MEDICARE

## 2025-08-18 DIAGNOSIS — K74.69: Primary | ICD-10-CM

## 2025-08-18 PROCEDURE — 25010000002 ALBUMIN HUMAN 25% PER 50 ML: Performed by: INTERNAL MEDICINE

## 2025-08-18 PROCEDURE — 96365 THER/PROPH/DIAG IV INF INIT: CPT

## 2025-08-18 PROCEDURE — 96375 TX/PRO/DX INJ NEW DRUG ADDON: CPT

## 2025-08-18 PROCEDURE — 25010000002 FUROSEMIDE PER 20 MG: Performed by: INTERNAL MEDICINE

## 2025-08-18 PROCEDURE — P9047 ALBUMIN (HUMAN), 25%, 50ML: HCPCS | Performed by: INTERNAL MEDICINE

## 2025-08-18 RX ORDER — ALBUMIN (HUMAN) 12.5 G/50ML
25 SOLUTION INTRAVENOUS
Status: COMPLETED | OUTPATIENT
Start: 2025-08-18 | End: 2025-08-18

## 2025-08-18 RX ORDER — FUROSEMIDE 10 MG/ML
40 INJECTION INTRAMUSCULAR; INTRAVENOUS ONCE
Status: CANCELLED
Start: 2025-08-25 | End: 2025-08-25

## 2025-08-18 RX ORDER — ALBUMIN (HUMAN) 12.5 G/50ML
25 SOLUTION INTRAVENOUS
Status: CANCELLED
Start: 2025-08-25

## 2025-08-18 RX ORDER — FUROSEMIDE 10 MG/ML
40 INJECTION INTRAMUSCULAR; INTRAVENOUS ONCE
Status: COMPLETED | OUTPATIENT
Start: 2025-08-18 | End: 2025-08-18

## 2025-08-18 RX ADMIN — FUROSEMIDE 40 MG: 10 INJECTION, SOLUTION INTRAMUSCULAR; INTRAVENOUS at 11:09

## 2025-08-18 RX ADMIN — ALBUMIN (HUMAN) 25 G: 0.25 INJECTION, SOLUTION INTRAVENOUS at 10:31

## 2025-08-18 RX ADMIN — ALBUMIN (HUMAN) 25 G: 0.25 INJECTION, SOLUTION INTRAVENOUS at 10:01

## 2025-08-20 ENCOUNTER — HOSPITAL ENCOUNTER (OUTPATIENT)
Dept: GENERAL RADIOLOGY | Facility: HOSPITAL | Age: 85
Discharge: HOME | End: 2025-08-20
Admitting: INTERNAL MEDICINE
Payer: MEDICARE

## 2025-08-20 DIAGNOSIS — R13.13 PHARYNGEAL DYSPHAGIA: ICD-10-CM

## 2025-08-20 PROCEDURE — 74220 X-RAY XM ESOPHAGUS 1CNTRST: CPT

## 2025-08-22 ENCOUNTER — TELEPHONE (OUTPATIENT)
Dept: CARDIOLOGY | Facility: CLINIC | Age: 85
End: 2025-08-22
Payer: MEDICARE

## 2025-08-25 ENCOUNTER — HOSPITAL ENCOUNTER (OUTPATIENT)
Dept: INFUSION THERAPY | Facility: HOSPITAL | Age: 85
Discharge: HOME | End: 2025-08-25
Admitting: INTERNAL MEDICINE
Payer: MEDICARE

## 2025-08-25 VITALS
SYSTOLIC BLOOD PRESSURE: 139 MMHG | RESPIRATION RATE: 17 BRPM | OXYGEN SATURATION: 96 % | DIASTOLIC BLOOD PRESSURE: 69 MMHG | TEMPERATURE: 98.7 F | HEART RATE: 88 BPM

## 2025-08-25 DIAGNOSIS — K74.69: Primary | ICD-10-CM

## 2025-08-25 PROCEDURE — 25010000002 FUROSEMIDE PER 20 MG: Performed by: INTERNAL MEDICINE

## 2025-08-25 PROCEDURE — 25010000002 ALBUMIN HUMAN 25% PER 50 ML: Performed by: INTERNAL MEDICINE

## 2025-08-25 PROCEDURE — 96375 TX/PRO/DX INJ NEW DRUG ADDON: CPT

## 2025-08-25 PROCEDURE — 96365 THER/PROPH/DIAG IV INF INIT: CPT

## 2025-08-25 PROCEDURE — 96374 THER/PROPH/DIAG INJ IV PUSH: CPT

## 2025-08-25 PROCEDURE — P9047 ALBUMIN (HUMAN), 25%, 50ML: HCPCS | Performed by: INTERNAL MEDICINE

## 2025-08-25 RX ORDER — ALBUMIN (HUMAN) 12.5 G/50ML
25 SOLUTION INTRAVENOUS
Status: COMPLETED | OUTPATIENT
Start: 2025-08-25 | End: 2025-08-25

## 2025-08-25 RX ORDER — FUROSEMIDE 10 MG/ML
40 INJECTION INTRAMUSCULAR; INTRAVENOUS ONCE
Status: CANCELLED
Start: 2025-08-25 | End: 2025-08-25

## 2025-08-25 RX ORDER — FUROSEMIDE 10 MG/ML
40 INJECTION INTRAMUSCULAR; INTRAVENOUS ONCE
Status: COMPLETED | OUTPATIENT
Start: 2025-08-25 | End: 2025-08-25

## 2025-08-25 RX ORDER — ALBUMIN (HUMAN) 12.5 G/50ML
25 SOLUTION INTRAVENOUS
Status: CANCELLED
Start: 2025-08-25

## 2025-08-25 RX ADMIN — FUROSEMIDE 40 MG: 10 INJECTION, SOLUTION INTRAMUSCULAR; INTRAVENOUS at 11:35

## 2025-08-25 RX ADMIN — ALBUMIN (HUMAN) 25 G: 0.25 INJECTION, SOLUTION INTRAVENOUS at 10:31

## 2025-08-25 RX ADMIN — ALBUMIN (HUMAN) 25 G: 0.25 INJECTION, SOLUTION INTRAVENOUS at 11:00

## (undated) DEVICE — GOWN,REINF,POLY,ECL,PP SLV,XL: Brand: MEDLINE

## (undated) DEVICE — Device: Brand: ENDOGATOR

## (undated) DEVICE — THE BITE BLOCK MAXI, LATEX FREE STRAP IS USED TO PROTECT THE ENDOSCOPE INSERTION TUBE FROM BEING BITTEN BY THE PATIENT.

## (undated) DEVICE — FRCP BX RADJAW4 NDL 2.8 240CM LG OG BX40

## (undated) DEVICE — Device: Brand: DEFENDO AIR/WATER/SUCTION AND BIOPSY VALVE

## (undated) DEVICE — SOL NACL 0.9PCT 1000ML

## (undated) DEVICE — ST EXT IV SMRTSTE 2VLV FIX M LL 6ML 41

## (undated) DEVICE — MEDI-VAC YANKAUER SUCTION HANDLE W/BULBOUS TIP: Brand: CARDINAL HEALTH

## (undated) DEVICE — TBG PENCL TELESCP MEGADYNE SMOKE EVAC 10FT

## (undated) DEVICE — SYR LUERLOK 30CC

## (undated) DEVICE — LIMB HOLDER, WRIST/ANKLE: Brand: DEROYAL

## (undated) DEVICE — ADULT, W/LG. BACK PAD, RADIOTRANSPARENT ELEMENT AND LEAD WIRE COMPATIBLE W/: Brand: DEFIBRILLATION ELECTRODES

## (undated) DEVICE — SUCTION CANISTER, 1500CC, RIGID: Brand: DEROYAL

## (undated) DEVICE — TUBING, SUCTION, 1/4" X 10', STRAIGHT: Brand: MEDLINE

## (undated) DEVICE — DRSNG SURG AQUACEL AG/ADVNTGE 9X15CM 3.5X6IN

## (undated) DEVICE — SINGLE PORT MANIFOLD: Brand: NEPTUNE 2

## (undated) DEVICE — Device

## (undated) DEVICE — DECANT BG O JET

## (undated) DEVICE — TUBING, SUCTION, 1/4" X 20', STRAIGHT: Brand: MEDLINE INDUSTRIES, INC.

## (undated) DEVICE — SET PRIMARY GRVTY 10DP MALE LL 104IN

## (undated) DEVICE — CONN Y IRR DISP 1P/U

## (undated) DEVICE — IRRIGATOR BULB ASEPTO 60CC STRL

## (undated) DEVICE — PLASMABLADE PS210-030S 3.0S LOCK: Brand: PLASMABLADE™

## (undated) DEVICE — TRAP FLD MINIVAC MEGADYNE 100ML

## (undated) DEVICE — ST INF PRI SMRTSTE 20DRP 2VLV 24ML 117

## (undated) DEVICE — LEX ELECTRO PHYSIOLOGY: Brand: MEDLINE INDUSTRIES, INC.

## (undated) DEVICE — CANN NASL CO2 DIVIDED A/